# Patient Record
Sex: MALE | Race: WHITE
[De-identification: names, ages, dates, MRNs, and addresses within clinical notes are randomized per-mention and may not be internally consistent; named-entity substitution may affect disease eponyms.]

---

## 2017-08-31 ENCOUNTER — HOSPITAL ENCOUNTER (OUTPATIENT)
Dept: HOSPITAL 76 - EMS | Age: 72
End: 2017-08-31
Attending: SURGERY
Payer: MEDICARE

## 2017-08-31 DIAGNOSIS — Z03.89: Primary | ICD-10-CM

## 2018-06-18 ENCOUNTER — HOSPITAL ENCOUNTER (EMERGENCY)
Dept: HOSPITAL 76 - ED | Age: 73
Discharge: HOME | End: 2018-06-18
Payer: SELF-PAY

## 2018-06-18 ENCOUNTER — HOSPITAL ENCOUNTER (OUTPATIENT)
Age: 73
Discharge: ANOTHER HOSPITAL | End: 2018-06-18

## 2018-06-18 VITALS — DIASTOLIC BLOOD PRESSURE: 70 MMHG | SYSTOLIC BLOOD PRESSURE: 148 MMHG

## 2018-06-18 DIAGNOSIS — R40.4: Primary | ICD-10-CM

## 2018-06-18 LAB
ALBUMIN DIAFP-MCNC: 3.3 G/DL
ALBUMIN/GLOB SERPL: 1 {RATIO}
ALP SERPL-CCNC: 93 IU/L
ALT SERPL W P-5'-P-CCNC: 10 IU/L
AMPHET UR QL SCN: NEGATIVE
ANION GAP SERPL CALCULATED.4IONS-SCNC: 8 MMOL/L
APAP SERPL-MCNC: < 10 UG/ML
AST SERPL W P-5'-P-CCNC: 21 IU/L
BASOPHILS NFR BLD AUTO: 0.1 10^3/UL
BASOPHILS NFR BLD AUTO: 2.4 %
BENZODIAZ UR QL SCN: NEGATIVE
BILIRUB BLD-MCNC: 0.7 MG/DL
BUN SERPL-MCNC: 22 MG/DL
CALCIUM UR-MCNC: 8.6 MG/DL
CHLORIDE SERPL-SCNC: 103 MMOL/L
CK SERPL-CCNC: 95 IU/L
CLARITY UR REFRACT.AUTO: CLEAR
CO2 SERPL-SCNC: 25 MMOL/L
COCAINE UR-SCNC: NEGATIVE UMOL/L
CREAT SERPLBLD-SCNC: 1.1 MG/DL
EOSINOPHIL # BLD AUTO: 0.1 10^3/UL
EOSINOPHIL NFR BLD AUTO: 2.4 %
ERYTHROCYTE [DISTWIDTH] IN BLOOD BY AUTOMATED COUNT: 15.3 %
GFRSERPLBLD MDRD-ARVRAT: 65 ML/MIN/{1.73_M2}
GLOBULIN SER-MCNC: 3.4 G/DL
GLUCOSE SERPL-MCNC: 103 MG/DL
GLUCOSE UR QL STRIP.AUTO: NEGATIVE MG/DL
HGB UR QL STRIP: 11.4 G/DL
KETONES UR QL STRIP.AUTO: NEGATIVE MG/DL
LIPASE SERPL-CCNC: 25 U/L
LYMPHOCYTES # SPEC AUTO: 0.6 10^3/UL
LYMPHOCYTES NFR BLD AUTO: 13.7 %
MCH RBC QN AUTO: 34.1 PG
MCHC RBC AUTO-ENTMCNC: 33.3 G/DL
MCV RBC AUTO: 102.5 FL
METHADONE UR QL SCN: NEGATIVE
METHAMPHET UR QL SCN: NEGATIVE
MONOCYTES # BLD AUTO: 0.2 10^3/UL
MONOCYTES NFR BLD AUTO: 5.4 %
NEUTROPHILS # BLD AUTO: 3.4 10^3/UL
NEUTROPHILS # SNV AUTO: 4.4 X10^3/UL
NEUTROPHILS NFR BLD AUTO: 76.1 %
NITRITE UR QL STRIP.AUTO: NEGATIVE
OPIATES UR QL SCN: NEGATIVE
PDW BLD AUTO: 7.7 FL
PH UR STRIP.AUTO: 5.5 PH
PLATELET # BLD: 166 10^3/UL
PROT SPEC-MCNC: 6.7 G/DL
PROT UR STRIP.AUTO-MCNC: NEGATIVE MG/DL
RBC # UR STRIP.AUTO: NEGATIVE /UL
RBC MAR: 3.34 10^6/UL
SALICYLATES SERPL-MCNC: < 6 MG/DL
SODIUM SERPLBLD-SCNC: 136 MMOL/L
SP GR UR STRIP.AUTO: 1.02
UROBILINOGEN UR QL STRIP.AUTO: (no result) E.U./DL
UROBILINOGEN UR STRIP.AUTO-MCNC: NEGATIVE MG/DL
VOLATILE DRUGS POS SERPL SCN: (no result)

## 2018-06-18 PROCEDURE — 80306 DRUG TEST PRSMV INSTRMNT: CPT

## 2018-06-18 PROCEDURE — 82550 ASSAY OF CK (CPK): CPT

## 2018-06-18 PROCEDURE — 85025 COMPLETE CBC W/AUTO DIFF WBC: CPT

## 2018-06-18 PROCEDURE — 81001 URINALYSIS AUTO W/SCOPE: CPT

## 2018-06-18 PROCEDURE — 93005 ELECTROCARDIOGRAM TRACING: CPT

## 2018-06-18 PROCEDURE — 80307 DRUG TEST PRSMV CHEM ANLYZR: CPT

## 2018-06-18 PROCEDURE — 80320 DRUG SCREEN QUANTALCOHOLS: CPT

## 2018-06-18 PROCEDURE — 81003 URINALYSIS AUTO W/O SCOPE: CPT

## 2018-06-18 PROCEDURE — 83690 ASSAY OF LIPASE: CPT

## 2018-06-18 PROCEDURE — 99284 EMERGENCY DEPT VISIT MOD MDM: CPT

## 2018-06-18 PROCEDURE — 80053 COMPREHEN METABOLIC PANEL: CPT

## 2018-06-18 PROCEDURE — 70450 CT HEAD/BRAIN W/O DYE: CPT

## 2018-06-18 PROCEDURE — 36415 COLL VENOUS BLD VENIPUNCTURE: CPT

## 2018-06-18 PROCEDURE — 84484 ASSAY OF TROPONIN QUANT: CPT

## 2018-06-18 PROCEDURE — 80329 ANALGESICS NON-OPIOID 1 OR 2: CPT

## 2018-06-18 PROCEDURE — 87086 URINE CULTURE/COLONY COUNT: CPT

## 2018-06-18 PROCEDURE — 96360 HYDRATION IV INFUSION INIT: CPT

## 2018-06-18 NOTE — CT REPORT
Procedure Date:  06/18/2018   

Accession Number:  [id number] / [id number]                    

Procedure:  CT  - Head W/O CPT Code:  

 

FULL RESULT:

 

 

EXAM:

CT HEAD

 

EXAM DATE: 6/18/2018 04:31 PM.

 

CLINICAL HISTORY: ALOC.

 

COMPARISON: None.

 

TECHNIQUE: Multiaxial CT images were obtained from the foramen magnum to 

the vertex. Reformats: Coronal. IV contrast: None.

 

In accordance with CT protocol optimization, one or more of the following 

dose reduction techniques were utilized for this exam: automated exposure 

control, adjustment of mA and/or KV based on patient size, or use of 

iterative reconstructive technique.

 

FINDINGS:

Parenchyma: Negative for acute intracranial hemorrhage. No midline shift. 

No localizing edema demonstrated.

 

Extraaxial Spaces:  No subdural or epidural collections identified.

 

Ventricles: There is symmetric moderate to marked ventriculomegaly.

 

Sinuses and Orbits: Imaged paranasal sinuses, orbits, and mastoids show 

no significant abnormality.

 

Bones: No evidence of fracture or calvarial defect.

 

Other: None.

IMPRESSION:

1. Symmetric moderate to marked ventriculomegaly and evidence of 

parenchymal volume loss. No acute hemorrhage or shift.

 

[facility]

## 2018-06-18 NOTE — ED PHYSICIAN DOCUMENTATION
PD HPI ALTERED MENTAL STATUS





- Stated complaint


Stated Complaint: CONFUSION





- History obtained from


History obtained from: Patient, EMS





- History of Present Illness


Timing - onset: Unknown


Timing - duration: Other (unkown)


Quality / character: Confused


Associated symptoms: No: Headache, Stiff neck, Dyspnea, Cough, NVD


Basline status: Alert and oriented X 3


Treatment PTA: Accucheck (Normal per EMS)


Similar symptoms before: Has not had sx before


Recently seen: Not recently seen





- Additional information


Additional information: 





Per EMS the patient was found stumbling by police.  They thought he may be 

intoxicated and called EMS.  When EMS arrived he was confused, not oriented.  

They state he had a temperature of 101.  They have now brought him to the 

emergency department.  He states he does not know why he is here.  States he 

feels normal.  States he does not take any medications, is allergic to 

penicillin.  Denies any headache, chest pain. No abdominal pain.  No urinary 

symptoms.  Denies any drug use.





Review of Systems


Ten Systems: 10 systems reviewed and negative


Constitutional: denies: Chills


Ears: denies: Ear pain


Nose: denies: Rhinorrhea / runny nose, Congestion


Throat: denies: Sore throat


Cardiac: denies: Chest pain / pressure


Respiratory: denies: Cough


GI: denies: Nausea, Vomiting, Diarrhea


: denies: Dysuria


Skin: denies: Rash


Musculoskeletal: denies: Neck pain, Back pain


Neurologic: denies: Focal weakness, Numbness, Syncope, Seizure, Headache





PD PAST MEDICAL HISTORY





- Past Medical History


Past Medical History: No





- Past Surgical History


Past Surgical History: No





- Present Medications


Home Medications: 


 Ambulatory Orders











 Medication  Instructions  Recorded  Confirmed


 


No Known Home Medications [No  06/18/18 06/18/18





Known Home Medications]   














- Allergies


Allergies/Adverse Reactions: 


 Allergies











Allergy/AdvReac Type Severity Reaction Status Date / Time


 


Sulfa (Sulfonamide Allergy  Unknown Verified 06/18/18 16:06





Antibiotics)     


 


Penicillins AdvReac  Unknown Verified 06/18/18 16:06














- Living Situation


Living Arrangement: reports: At home





- Social History


Does the pt smoke?: Yes


Does the pt drink ETOH?: Yes


Does the pt have substance abuse?: No





- Family History


Family history: reports: Non contributory





PD ED PE NORMAL





- Vitals


Vital signs reviewed: Yes





- General


General: Alert and oriented X 3, No acute distress





- HEENT


HEENT: Atraumatic, PERRL, EOMI, Ears normal, Moist mucous membranes, Pharynx 

benign, Other (wound to the L side of the nose, patient states it is chronic. )





- Neck


Neck: Supple, no meningeal sign, No bony TTP





- Cardiac


Cardiac: RRR, Strong equal pulses





- Respiratory


Respiratory: No respiratory distress, Clear bilaterally





- Abdomen


Abdomen: Soft, Non tender, Non distended





- Back


Back: No spinal TTP





- Derm


Derm: Warm and dry, No rash





- Extremities


Extremities: No deformity, No edema, No calf tenderness / cord





- Neuro


Neuro: Alert and oriented X 3, CNs 2-12 intact, No motor deficit, No sensory 

deficit, Normal speech


Eye Opening: Spontaneous


Motor: Obeys Commands


Verbal: Oriented


GCS Score: 15





- Psych


Psych: Normal mood, Normal affect





Results





- Vitals


Vitals: 


 Vital Signs - 24 hr











  06/18/18 06/18/18 06/18/18





  16:06 16:24 16:52


 


Temperature 37.4 C  


 


Heart Rate 86 86 63


 


Respiratory 15 16 15





Rate   


 


Blood Pressure 120/77 120/77 117/70


 


O2 Saturation 93 93 96














  06/18/18 06/18/18





  17:48 18:31


 


Temperature  36.3 C L


 


Heart Rate 68 69


 


Respiratory  16





Rate  


 


Blood Pressure 133/68 H 148/70 H


 


O2 Saturation  93








 Oxygen











O2 Source                      Room air

















- Labs


Labs: 


 Laboratory Tests











  06/18/18 06/18/18 06/18/18





  16:10 16:10 16:10


 


WBC  4.4 L  


 


RBC  3.34 L  


 


Hgb  11.4 L  


 


Hct  34.2 L  


 


MCV  102.5 H  


 


MCH  34.1 H  


 


MCHC  33.3  


 


RDW  15.3 H  


 


Plt Count  166  


 


MPV  7.7  


 


Neut # (Auto)  3.4  


 


Lymph # (Auto)  0.6 L  


 


Mono # (Auto)  0.2  


 


Eos # (Auto)  0.1  


 


Baso # (Auto)  0.1  


 


Absolute Nucleated RBC  0.01  


 


Nucleated RBC %  0.1  


 


Sodium   136 


 


Potassium   4.0 


 


Chloride   103 


 


Carbon Dioxide   25 


 


Anion Gap   8.0 


 


BUN   22 H 


 


Creatinine   1.1 


 


Estimated GFR (MDRD)   65 L 


 


Glucose   103 H 


 


Calcium   8.6 


 


Total Bilirubin   0.7 


 


AST   21 


 


ALT   10 


 


Alkaline Phosphatase   93 


 


Total Creatine Kinase   95 


 


Troponin I    < 0.04


 


Total Protein   6.7 


 


Albumin   3.3 


 


Globulin   3.4 


 


Albumin/Globulin Ratio   1.0 


 


Lipase   25 


 


Urine Color   


 


Urine Clarity   


 


Urine pH   


 


Ur Specific Gravity   


 


Urine Protein   


 


Urine Glucose (UA)   


 


Urine Ketones   


 


Urine Occult Blood   


 


Urine Nitrite   


 


Urine Bilirubin   


 


Urine Urobilinogen   


 


Ur Leukocyte Esterase   


 


Ur Microscopic Review   


 


Urine Culture Comments   


 


Salicylates   < 6.0 


 


Urine Opiates Screen   


 


Ur Oxycodone Screen   


 


Urine Methadone Screen   


 


Ur Propoxyphene Screen   


 


Acetaminophen   < 10 L 


 


Ur Barbiturates Screen   


 


Ur Tricyclics Screen   


 


Ur Phencyclidine Scrn   


 


Ur Amphetamine Screen   


 


U Methamphetamines Scrn   


 


U Benzodiazepines Scrn   


 


Urine Cocaine Screen   


 


U Cannabinoids Screen   


 


Ethyl Alcohol   < 5.0 














  06/18/18





  17:40


 


WBC 


 


RBC 


 


Hgb 


 


Hct 


 


MCV 


 


MCH 


 


MCHC 


 


RDW 


 


Plt Count 


 


MPV 


 


Neut # (Auto) 


 


Lymph # (Auto) 


 


Mono # (Auto) 


 


Eos # (Auto) 


 


Baso # (Auto) 


 


Absolute Nucleated RBC 


 


Nucleated RBC % 


 


Sodium 


 


Potassium 


 


Chloride 


 


Carbon Dioxide 


 


Anion Gap 


 


BUN 


 


Creatinine 


 


Estimated GFR (MDRD) 


 


Glucose 


 


Calcium 


 


Total Bilirubin 


 


AST 


 


ALT 


 


Alkaline Phosphatase 


 


Total Creatine Kinase 


 


Troponin I 


 


Total Protein 


 


Albumin 


 


Globulin 


 


Albumin/Globulin Ratio 


 


Lipase 


 


Urine Color  YELLOW


 


Urine Clarity  CLEAR


 


Urine pH  5.5


 


Ur Specific Gravity  1.025


 


Urine Protein  NEGATIVE


 


Urine Glucose (UA)  NEGATIVE


 


Urine Ketones  NEGATIVE


 


Urine Occult Blood  NEGATIVE


 


Urine Nitrite  NEGATIVE


 


Urine Bilirubin  NEGATIVE


 


Urine Urobilinogen  0.2 (NORMAL)


 


Ur Leukocyte Esterase  NEGATIVE


 


Ur Microscopic Review  NOT INDICATED


 


Urine Culture Comments  NOT INDICATED


 


Salicylates 


 


Urine Opiates Screen  NEGATIVE


 


Ur Oxycodone Screen  NEGATIVE


 


Urine Methadone Screen  NEGATIVE


 


Ur Propoxyphene Screen  NEGATIVE


 


Acetaminophen 


 


Ur Barbiturates Screen  NEGATIVE


 


Ur Tricyclics Screen  NEGATIVE


 


Ur Phencyclidine Scrn  NEGATIVE


 


Ur Amphetamine Screen  NEGATIVE


 


U Methamphetamines Scrn  NEGATIVE


 


U Benzodiazepines Scrn  NEGATIVE


 


Urine Cocaine Screen  NEGATIVE


 


U Cannabinoids Screen  NEGATIVE


 


Ethyl Alcohol 














- Rads (name of study)


  ** head ct


Radiology: Prelim report reviewed, EMP read contemporaneously, See rad report (

Symmetric moderate to marked ventriculomegaly and evidence of parenchymal 

volume loss. No acute hemorrhage or shift. )





PD MEDICAL DECISION MAKING





- ED course


Complexity details: reviewed results, re-evaluated patient, considered 

differential, d/w patient


ED course: 





Patient is a 72-year-old male who was brought in by EMS for altered mental 

status via police report earlier today.  He is alert and oriented 3 in the 

emergency department.  No fever.  No laboratory abnormalities.  No 

abnormalities on head CT.  No evidence of stroke.  Unclear etiology of his 

symptoms earlier.  We will have him follow-up closely with his doctor.  

Ambulating well.  States he wants to go outside for a cigarette.  Patient was 

picked up by a friend and taken home.  Patient counseled regarding signs and 

symptoms for which I believe and urgent re-evaluation would be necessary. 

Patient with good understanding of and agreement to plan and is comfortable 

going home at this time





This document was made in part using voice recognition software. While efforts 

are made to proofread this document, sound alike and grammatical errors may 

occur.





- Sepsis Event


Vital Signs: 


 Vital Signs - 24 hr











  06/18/18 06/18/18 06/18/18





  16:06 16:24 16:52


 


Temperature 37.4 C  


 


Heart Rate 86 86 63


 


Respiratory 15 16 15





Rate   


 


Blood Pressure 120/77 120/77 117/70


 


O2 Saturation 93 93 96














  06/18/18 06/18/18





  17:48 18:31


 


Temperature  36.3 C L


 


Heart Rate 68 69


 


Respiratory  16





Rate  


 


Blood Pressure 133/68 H 148/70 H


 


O2 Saturation  93








 Oxygen











O2 Source                      Room air

















Departure





- Departure


Disposition: 01 Home, Self Care


Clinical Impression: 


Altered mental status


Qualifiers:


 Altered mental status type: transient alteration of awareness Qualified Code(s)

: R40.4 - Transient alteration of awareness





Condition: Good


Instructions:  ED Altered Loc


Follow-Up: 


your,doctor in 1 week [Other]


Comments: 


Return if you worsen. Drink plenty of water at home. 


Discharge Date/Time: 06/18/18 19:04

## 2018-08-15 ENCOUNTER — HOSPITAL ENCOUNTER (OUTPATIENT)
Dept: HOSPITAL 76 - EMS | Age: 73
Discharge: ANOTHER HOSPITAL | End: 2018-08-15
Attending: SURGERY
Payer: SELF-PAY

## 2018-08-15 ENCOUNTER — HOSPITAL ENCOUNTER (EMERGENCY)
Dept: HOSPITAL 76 - ED | Age: 73
Discharge: HOME | End: 2018-08-15
Payer: SELF-PAY

## 2018-08-15 VITALS — DIASTOLIC BLOOD PRESSURE: 90 MMHG | SYSTOLIC BLOOD PRESSURE: 147 MMHG
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
## 2018-08-15 DIAGNOSIS — Z23: ICD-10-CM
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
## 2018-08-15 DIAGNOSIS — S50.312A: ICD-10-CM

## 2018-08-15 DIAGNOSIS — S00.03XA: Primary | ICD-10-CM
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
## 2018-08-15 DIAGNOSIS — S50.311A: ICD-10-CM
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
## 2018-08-15 DIAGNOSIS — S00.01XA: ICD-10-CM

## 2018-08-15 DIAGNOSIS — R41.0: ICD-10-CM

## 2018-08-15 DIAGNOSIS — F17.200: ICD-10-CM

## 2018-08-15 DIAGNOSIS — S09.90XA: Primary | ICD-10-CM

## 2018-08-15 LAB
ALBUMIN DIAFP-MCNC: 3.6 G/DL (ref 3.2–5.5)
ALBUMIN/GLOB SERPL: 1.2 {RATIO} (ref 1–2.2)
ALP SERPL-CCNC: 77 IU/L (ref 42–121)
ALT SERPL W P-5'-P-CCNC: 12 IU/L (ref 10–60)
AMPHET UR QL SCN: NEGATIVE
ANION GAP SERPL CALCULATED.4IONS-SCNC: 5 MMOL/L (ref 6–13)
AST SERPL W P-5'-P-CCNC: 22 IU/L (ref 10–42)
BASOPHILS NFR BLD AUTO: 0.1 10^3/UL (ref 0–0.1)
BASOPHILS NFR BLD AUTO: 1.5 %
BENZODIAZ UR QL SCN: NEGATIVE
BILIRUB BLD-MCNC: 0.5 MG/DL (ref 0.2–1)
BUN SERPL-MCNC: 19 MG/DL (ref 6–20)
CALCIUM UR-MCNC: 8.6 MG/DL (ref 8.5–10.3)
CHLORIDE SERPL-SCNC: 103 MMOL/L (ref 101–111)
CLARITY UR REFRACT.AUTO: CLEAR
CO2 SERPL-SCNC: 29 MMOL/L (ref 21–32)
COCAINE UR-SCNC: NEGATIVE UMOL/L
CREAT SERPLBLD-SCNC: 1.1 MG/DL (ref 0.6–1.2)
EOSINOPHIL # BLD AUTO: 0.1 10^3/UL (ref 0–0.7)
EOSINOPHIL NFR BLD AUTO: 1.9 %
ERYTHROCYTE [DISTWIDTH] IN BLOOD BY AUTOMATED COUNT: 15.7 % (ref 12–15)
GFRSERPLBLD MDRD-ARVRAT: 65 ML/MIN/{1.73_M2} (ref 89–?)
GLOBULIN SER-MCNC: 3 G/DL (ref 2.1–4.2)
GLUCOSE SERPL-MCNC: 108 MG/DL (ref 70–100)
GLUCOSE UR QL STRIP.AUTO: NEGATIVE MG/DL
HGB UR QL STRIP: 11.5 G/DL (ref 14–18)
KETONES UR QL STRIP.AUTO: NEGATIVE MG/DL
LIPASE SERPL-CCNC: 23 U/L (ref 22–51)
LYMPHOCYTES # SPEC AUTO: 0.6 10^3/UL (ref 1.5–3.5)
LYMPHOCYTES NFR BLD AUTO: 15.5 %
MCH RBC QN AUTO: 34.8 PG (ref 27–31)
MCHC RBC AUTO-ENTMCNC: 34.2 G/DL (ref 32–36)
MCV RBC AUTO: 101.7 FL (ref 80–94)
METHADONE UR QL SCN: NEGATIVE
METHAMPHET UR QL SCN: NEGATIVE
MONOCYTES # BLD AUTO: 0.2 10^3/UL (ref 0–1)
MONOCYTES NFR BLD AUTO: 6.6 %
NEUTROPHILS # BLD AUTO: 2.7 10^3/UL (ref 1.5–6.6)
NEUTROPHILS # SNV AUTO: 3.6 X10^3/UL (ref 4.8–10.8)
NEUTROPHILS NFR BLD AUTO: 74.5 %
NITRITE UR QL STRIP.AUTO: NEGATIVE
OPIATES UR QL SCN: NEGATIVE
PDW BLD AUTO: 7.8 FL (ref 7.4–11.4)
PH UR STRIP.AUTO: 6.5 PH (ref 5–7.5)
PLATELET # BLD: 150 10^3/UL (ref 130–450)
PROT SPEC-MCNC: 6.6 G/DL (ref 6.7–8.2)
PROT UR STRIP.AUTO-MCNC: NEGATIVE MG/DL
RBC # UR STRIP.AUTO: NEGATIVE /UL
RBC MAR: 3.32 10^6/UL (ref 4.7–6.1)
SODIUM SERPLBLD-SCNC: 137 MMOL/L (ref 135–145)
SP GR UR STRIP.AUTO: 1.02 (ref 1–1.03)
UROBILINOGEN UR QL STRIP.AUTO: (no result) E.U./DL
UROBILINOGEN UR STRIP.AUTO-MCNC: NEGATIVE MG/DL
VOLATILE DRUGS POS SERPL SCN: (no result)

## 2018-08-15 PROCEDURE — 81003 URINALYSIS AUTO W/O SCOPE: CPT

## 2018-08-15 PROCEDURE — 80320 DRUG SCREEN QUANTALCOHOLS: CPT

## 2018-08-15 PROCEDURE — 36415 COLL VENOUS BLD VENIPUNCTURE: CPT

## 2018-08-15 PROCEDURE — 90471 IMMUNIZATION ADMIN: CPT

## 2018-08-15 PROCEDURE — 80053 COMPREHEN METABOLIC PANEL: CPT

## 2018-08-15 PROCEDURE — 80306 DRUG TEST PRSMV INSTRMNT: CPT

## 2018-08-15 PROCEDURE — 99284 EMERGENCY DEPT VISIT MOD MDM: CPT

## 2018-08-15 PROCEDURE — 93005 ELECTROCARDIOGRAM TRACING: CPT

## 2018-08-15 PROCEDURE — 87086 URINE CULTURE/COLONY COUNT: CPT

## 2018-08-15 PROCEDURE — 84484 ASSAY OF TROPONIN QUANT: CPT

## 2018-08-15 PROCEDURE — 83690 ASSAY OF LIPASE: CPT

## 2018-08-15 PROCEDURE — 85025 COMPLETE CBC W/AUTO DIFF WBC: CPT

## 2018-08-15 PROCEDURE — 70450 CT HEAD/BRAIN W/O DYE: CPT

## 2018-08-15 PROCEDURE — 99283 EMERGENCY DEPT VISIT LOW MDM: CPT

## 2018-08-15 PROCEDURE — 81001 URINALYSIS AUTO W/SCOPE: CPT

## 2018-08-15 NOTE — ED PHYSICIAN DOCUMENTATION
PD HPI Fall





- Stated complaint


Stated Complaint: GLF





- Chief complaint


Chief Complaint: Trauma Hd/Nk





- History obtained from


History obtained from: Patient, EMS





- History of Present Illness


Mechanism of injury: Tripped


Fall distance: Standing position


Where injury occurred: Street


Injury(ies) location: Head, Right Upper Extremity, Left Uppper Extremity


Associated symptoms: No: LOC, Neck pain, Dyspnea, Nausea / vomiting





- Additional information


Additional information: 


The patient is a 72-year-old male who arrives via ambulance after stumbling 

over a curb when crossing a street, falling backwards and hitting his head on 

the pavement.  He denies loss of consciousness, and denies any pain at this 

time.  Medics report that he was ambulatory at the scene, and had a normal 

blood sugar.  He denies any recent use of alcohol or other drugs.  He takes no 

prescription medication. Review of his medical record reveals that he was seen 

here 2 months ago for evaluation of transient confusion.  He lives alone in his 

own home.








Review of Systems


Constitutional: denies: Fever


Eyes: denies: Decreased vision


Ears: denies: Tinnitus/ringing


Nose: denies: Congestion


Throat: denies: Sore throat


Cardiac: denies: Chest pain / pressure


Respiratory: denies: Dyspnea, Cough


GI: denies: Abdominal Pain, Nausea, Vomiting


: denies: Dysuria


Skin: reports: Abrasion (s) (abrasions of elbows bilaterally.).  denies: Rash


Musculoskeletal: denies: Neck pain, Back pain


Neurologic: denies: Focal weakness, Numbness, Headache, LOC





PD PAST MEDICAL HISTORY





- Past Medical History


Cardiovascular: None


Endocrine/Autoimmune: None


Derm: Other





- Past Surgical History


Past Surgical History: No





- Present Medications


Home Medications: 


 Ambulatory Orders











 Medication  Instructions  Recorded  Confirmed


 


No Known Home Medications [No  06/18/18 06/18/18





Known Home Medications]   














- Allergies


Allergies/Adverse Reactions: 


 Allergies











Allergy/AdvReac Type Severity Reaction Status Date / Time


 


Sulfa (Sulfonamide Allergy  Unknown Verified 06/18/18 16:06





Antibiotics)     


 


Penicillins AdvReac  Unknown Verified 06/18/18 16:06














- Living Situation


Living Arrangement: reports: At home





- Social History


Does the pt smoke?: Yes


Smoking Status: Current every day smoker


Does the pt drink ETOH?: Yes


Does the pt have substance abuse?: No





PD ED PE NORMAL





- Vitals


Vital signs reviewed: Yes (normal)





- General


General: Alert and oriented X 3, No acute distress, Other (ill kempt)





- HEENT


HEENT: Atraumatic, Moist mucous membranes, Pharynx benign, Other (esotropia; 

superficial abrasion to occipital scalp.)





- Neck


Neck: Supple, no meningeal sign, No bony TTP, No JVD, Other (Full cervical 

range of motion, without tenderness.)





- Cardiac


Cardiac: RRR





- Respiratory


Respiratory: No respiratory distress, Clear bilaterally





- Abdomen


Abdomen: Soft, Non tender





- Back


Back: No spinal TTP





- Derm


Derm: No rash





- Extremities


Extremities: No calf tenderness / cord, Other (Superficial abrasions at the 

posterior aspects of both elbows.  He has full range of motion of the elbows, 

including supination and pronation of the forearms, without tenderness.  Distal 

neurovascular is intact.  Bilateral lower extremity lymphedema.)





- Neuro


Neuro: Alert and oriented X 3 (Alert and basically oriented, except for mild 

confusion regarding recent events, consistent with dementia.), No motor deficit

, No sensory deficit, Other (The patient demonstrates mild confusion, 

consistent with dementia.)


Eye Opening: Spontaneous


Motor: Obeys Commands


Verbal: Confused (Mild confusion regarding recent events.)


GCS Score: 14





Results





- Vitals


Vitals: 


 Oxygen











O2 Source                      Room air

















- EKG (time done)


  ** 14:43


Rate: Rate (enter#) (65)


Rhythm: NSR


Axis: LAD


Intervals: Normal [place]


Ischemia: Q waves (in V1, V2, consistent with previous anterior MI.)


Compare to prior EKG: Unchanged from prior EKG


Computer interpretation: Agree with computer





- Labs


Labs: 


 Laboratory Tests











  08/15/18 08/15/18 08/15/18





  14:28 14:28 14:28


 


WBC  3.6 L  


 


RBC  3.32 L  


 


Hgb  11.5 L  


 


Hct  33.7 L  


 


MCV  101.7 H  


 


MCH  34.8 H  


 


MCHC  34.2  


 


RDW  15.7 H  


 


Plt Count  150  


 


MPV  7.8  


 


Neut # (Auto)  2.7  


 


Lymph # (Auto)  0.6 L  


 


Mono # (Auto)  0.2  


 


Eos # (Auto)  0.1  


 


Baso # (Auto)  0.1  


 


Absolute Nucleated RBC  0.00  


 


Nucleated RBC %  0.0  


 


Sodium   137 


 


Potassium   4.1 


 


Chloride   103 


 


Carbon Dioxide   29 


 


Anion Gap   5.0 L 


 


BUN   19 


 


Creatinine   1.1 


 


Estimated GFR (MDRD)   65 L 


 


Glucose   108 H 


 


Calcium   8.6 


 


Total Bilirubin   0.5 


 


AST   22 


 


ALT   12 


 


Alkaline Phosphatase   77 


 


Troponin I    < 0.04


 


Total Protein   6.6 L 


 


Albumin   3.6 


 


Globulin   3.0 


 


Albumin/Globulin Ratio   1.2 


 


Lipase   23 


 


Urine Color   


 


Urine Clarity   


 


Urine pH   


 


Ur Specific Gravity   


 


Urine Protein   


 


Urine Glucose (UA)   


 


Urine Ketones   


 


Urine Occult Blood   


 


Urine Nitrite   


 


Urine Bilirubin   


 


Urine Urobilinogen   


 


Ur Leukocyte Esterase   


 


Ur Microscopic Review   


 


Urine Culture Comments   


 


Urine Opiates Screen   


 


Ur Oxycodone Screen   


 


Urine Methadone Screen   


 


Ur Propoxyphene Screen   


 


Ur Barbiturates Screen   


 


Ur Tricyclics Screen   


 


Ur Phencyclidine Scrn   


 


Ur Amphetamine Screen   


 


U Methamphetamines Scrn   


 


U Benzodiazepines Scrn   


 


Urine Cocaine Screen   


 


U Cannabinoids Screen   


 


Ethyl Alcohol   < 5.0 














  08/15/18





  16:10


 


WBC 


 


RBC 


 


Hgb 


 


Hct 


 


MCV 


 


MCH 


 


MCHC 


 


RDW 


 


Plt Count 


 


MPV 


 


Neut # (Auto) 


 


Lymph # (Auto) 


 


Mono # (Auto) 


 


Eos # (Auto) 


 


Baso # (Auto) 


 


Absolute Nucleated RBC 


 


Nucleated RBC % 


 


Sodium 


 


Potassium 


 


Chloride 


 


Carbon Dioxide 


 


Anion Gap 


 


BUN 


 


Creatinine 


 


Estimated GFR (MDRD) 


 


Glucose 


 


Calcium 


 


Total Bilirubin 


 


AST 


 


ALT 


 


Alkaline Phosphatase 


 


Troponin I 


 


Total Protein 


 


Albumin 


 


Globulin 


 


Albumin/Globulin Ratio 


 


Lipase 


 


Urine Color  YELLOW


 


Urine Clarity  CLEAR


 


Urine pH  6.5


 


Ur Specific Gravity  1.020


 


Urine Protein  NEGATIVE


 


Urine Glucose (UA)  NEGATIVE


 


Urine Ketones  NEGATIVE


 


Urine Occult Blood  NEGATIVE


 


Urine Nitrite  NEGATIVE


 


Urine Bilirubin  NEGATIVE


 


Urine Urobilinogen  0.2 (NORMAL)


 


Ur Leukocyte Esterase  NEGATIVE


 


Ur Microscopic Review  NOT INDICATED


 


Urine Culture Comments  NOT INDICATED


 


Urine Opiates Screen  NEGATIVE


 


Ur Oxycodone Screen  NEGATIVE


 


Urine Methadone Screen  NEGATIVE


 


Ur Propoxyphene Screen  NEGATIVE


 


Ur Barbiturates Screen  NEGATIVE


 


Ur Tricyclics Screen  NEGATIVE


 


Ur Phencyclidine Scrn  NEGATIVE


 


Ur Amphetamine Screen  NEGATIVE


 


U Methamphetamines Scrn  NEGATIVE


 


U Benzodiazepines Scrn  NEGATIVE


 


Urine Cocaine Screen  NEGATIVE


 


U Cannabinoids Screen  NEGATIVE


 


Ethyl Alcohol 














- Rads (name of study)


  ** head CT


Radiology: Prelim report reviewed, EMP read contemporaneously, See rad report (

Normal pressure hydrocephalus.  No acute intracranial abnormality nor bleed.)





PD MEDICAL DECISION MAKING





- ED course


Complexity details: reviewed old records, reviewed results, re-evaluated patient

, considered differential, d/w patient


ED course: 


The patient's presentation is significant for fall to the pavement, with 

injuries that include abrasions to the occipital scalp and to both elbows 

posteriorly.  Head CT reveals no acute intracranial abnormality.  Laboratory 

evaluation is unremarkable.


Treatment in the emergency department included cleaning of the abrasions and 

application of antibiotic ointment and wound dressings.  Tetanus booster was 

administered.  The patient demonstrated ability to ambulate without difficulty.

  I discussed with him the expected course of injuries, symptomatic treatment 

and outpatient follow-up, as well as potentially worrisome signs or symptoms 

that should prompt reevaluation in the emergency department.








- Sepsis Event


Vital Signs: 


 Oxygen











O2 Source                      Room air

















Departure





- Departure


Disposition: 01 Home, Self Care


Clinical Impression: 


 Abrasions of multiple sites





Fall


Qualifiers:


 Encounter type: initial encounter Qualified Code(s): W19.XXXA - Unspecified 

fall, initial encounter





Contusion of head


Qualifiers:


 Encounter type: initial encounter Contusion of head detail: scalp Qualified 

Code(s): S00.03XA - Contusion of scalp, initial encounter





Condition: Stable


Instructions:  ED Head Injury Closed


Comments: 


You can use ibuprofen, up to 800 mg 3 times daily if needed for discomfort.


Keep the abrasions clean.


Follow up with your primary physician within 1-2 weeks.  Call to schedule 

appointment.


Return to the emergency department if you develop increasing headache, 

persistent vomiting, increased difficulty walking, or otherwise worsening 

symptoms.


Discharge Date/Time: 08/15/18 16:20

## 2018-08-15 NOTE — CT REPORT
Procedure Date:  08/15/2018   

Accession Number:  [id number] / [id number]                    

Procedure:  CT  - Head W/O CPT Code:  

 

FULL RESULT:

 

 

EXAM:

CT HEAD

 

EXAM DATE: 8/15/2018 02:40 PM.

 

CLINICAL HISTORY: Fall with head injury.

 

COMPARISON: Head w/o contrast 06/18/2018.

 

TECHNIQUE: Multiaxial CT images were obtained from the foramen magnum to 

the vertex. Reformats: Coronal. IV contrast: None.

 

In accordance with CT protocol optimization, one or more of the following 

dose reduction techniques were utilized for this exam: automated exposure 

control, adjustment of mA and/or KV based on patient size, or use of 

iterative reconstructive technique.

 

FINDINGS:

Parenchyma: No intraparenchymal hemorrhage. No evidence of mass, midline 

shift, or CT findings of acute infarction. Gray-white differentiation is 

distinct. Diffuse chronic microangiopathic white matter changes are 

evident.

 

Extraaxial Spaces: Normal for age. No subdural or epidural collections 

identified.

 

Ventricles: Disproportionate moderate-to-marked enlargement of the third 

and lateral ventricles. Fourth ventricle is small in caliber. No midline 

shift nor intraventricular blood products.

 

Sinuses and orbits: Imaged paranasal sinuses, orbits, and mastoids show 

no significant abnormality.

 

Bones: No evidence of fracture or calvarial defect.

 

Other: None.

IMPRESSION:

1. Normal pressure hydrocephalus.

2. No acute intracranial abnormality nor bleed.

 

[facility]

## 2018-10-08 ENCOUNTER — HOSPITAL ENCOUNTER (EMERGENCY)
Dept: HOSPITAL 76 - ED | Age: 73
Discharge: HOME | End: 2018-10-08
Payer: SELF-PAY

## 2018-10-08 ENCOUNTER — HOSPITAL ENCOUNTER (OUTPATIENT)
Dept: HOSPITAL 76 - EMS | Age: 73
Discharge: ANOTHER HOSPITAL | End: 2018-10-08
Attending: SURGERY
Payer: SELF-PAY

## 2018-10-08 DIAGNOSIS — F17.200: ICD-10-CM

## 2018-10-08 DIAGNOSIS — Z91.81: ICD-10-CM

## 2018-10-08 DIAGNOSIS — G91.2: Primary | ICD-10-CM
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
## 2018-10-08 DIAGNOSIS — M25.521: Primary | ICD-10-CM

## 2018-10-08 DIAGNOSIS — F03.90: ICD-10-CM

## 2018-10-08 LAB
ALBUMIN DIAFP-MCNC: 4 G/DL (ref 3.2–5.5)
ALBUMIN/GLOB SERPL: 1.2 {RATIO} (ref 1–2.2)
ALP SERPL-CCNC: 129 IU/L (ref 42–121)
ALT SERPL W P-5'-P-CCNC: 13 IU/L (ref 10–60)
AMPHET UR QL SCN: NEGATIVE
ANION GAP SERPL CALCULATED.4IONS-SCNC: 10 MMOL/L (ref 6–13)
APAP SERPL-MCNC: < 10 UG/ML (ref 10–30)
AST SERPL W P-5'-P-CCNC: 25 IU/L (ref 10–42)
BASOPHILS NFR BLD AUTO: 0.1 10^3/UL (ref 0–0.1)
BASOPHILS NFR BLD AUTO: 1.3 %
BENZODIAZ UR QL SCN: NEGATIVE
BILIRUB BLD-MCNC: 0.8 MG/DL (ref 0.2–1)
BUN SERPL-MCNC: 29 MG/DL (ref 6–20)
CALCIUM UR-MCNC: 8.7 MG/DL (ref 8.5–10.3)
CHLORIDE SERPL-SCNC: 99 MMOL/L (ref 101–111)
CLARITY UR REFRACT.AUTO: CLEAR
CO2 SERPL-SCNC: 26 MMOL/L (ref 21–32)
COCAINE UR-SCNC: NEGATIVE UMOL/L
CREAT SERPLBLD-SCNC: 1.2 MG/DL (ref 0.6–1.2)
EOSINOPHIL # BLD AUTO: 0.1 10^3/UL (ref 0–0.7)
EOSINOPHIL NFR BLD AUTO: 1.8 %
ERYTHROCYTE [DISTWIDTH] IN BLOOD BY AUTOMATED COUNT: 15.6 % (ref 12–15)
GFRSERPLBLD MDRD-ARVRAT: 59 ML/MIN/{1.73_M2} (ref 89–?)
GLOBULIN SER-MCNC: 3.3 G/DL (ref 2.1–4.2)
GLUCOSE SERPL-MCNC: 90 MG/DL (ref 70–100)
GLUCOSE UR QL STRIP.AUTO: NEGATIVE MG/DL
HGB UR QL STRIP: 12.3 G/DL (ref 14–18)
KETONES UR QL STRIP.AUTO: NEGATIVE MG/DL
LIPASE SERPL-CCNC: 25 U/L (ref 22–51)
LYMPHOCYTES # SPEC AUTO: 0.7 10^3/UL (ref 1.5–3.5)
LYMPHOCYTES NFR BLD AUTO: 12.5 %
MAGNESIUM SERPL-MCNC: 2.2 MG/DL (ref 1.7–2.8)
MCH RBC QN AUTO: 34.1 PG (ref 27–31)
MCHC RBC AUTO-ENTMCNC: 33.7 G/DL (ref 32–36)
MCV RBC AUTO: 101.5 FL (ref 80–94)
METHADONE UR QL SCN: NEGATIVE
METHAMPHET UR QL SCN: NEGATIVE
MONOCYTES # BLD AUTO: 0.4 10^3/UL (ref 0–1)
MONOCYTES NFR BLD AUTO: 8 %
NEUTROPHILS # BLD AUTO: 4.2 10^3/UL (ref 1.5–6.6)
NEUTROPHILS # SNV AUTO: 5.5 X10^3/UL (ref 4.8–10.8)
NEUTROPHILS NFR BLD AUTO: 76.4 %
NITRITE UR QL STRIP.AUTO: NEGATIVE
OPIATES UR QL SCN: NEGATIVE
PDW BLD AUTO: 7.7 FL (ref 7.4–11.4)
PH UR STRIP.AUTO: 5.5 PH (ref 5–7.5)
PLATELET # BLD: 185 10^3/UL (ref 130–450)
PROT SPEC-MCNC: 7.3 G/DL (ref 6.7–8.2)
PROT UR STRIP.AUTO-MCNC: NEGATIVE MG/DL
RBC # UR STRIP.AUTO: NEGATIVE /UL
RBC MAR: 3.61 10^6/UL (ref 4.7–6.1)
SALICYLATES SERPL-MCNC: < 6 MG/DL
SODIUM SERPLBLD-SCNC: 135 MMOL/L (ref 135–145)
SP GR UR STRIP.AUTO: >=1.03 (ref 1–1.03)
UROBILINOGEN UR QL STRIP.AUTO: (no result) E.U./DL
UROBILINOGEN UR STRIP.AUTO-MCNC: NEGATIVE MG/DL
VOLATILE DRUGS POS SERPL SCN: (no result)

## 2018-10-08 PROCEDURE — 83690 ASSAY OF LIPASE: CPT

## 2018-10-08 PROCEDURE — 87086 URINE CULTURE/COLONY COUNT: CPT

## 2018-10-08 PROCEDURE — 70450 CT HEAD/BRAIN W/O DYE: CPT

## 2018-10-08 PROCEDURE — 81003 URINALYSIS AUTO W/O SCOPE: CPT

## 2018-10-08 PROCEDURE — 80053 COMPREHEN METABOLIC PANEL: CPT

## 2018-10-08 PROCEDURE — 83735 ASSAY OF MAGNESIUM: CPT

## 2018-10-08 PROCEDURE — 36415 COLL VENOUS BLD VENIPUNCTURE: CPT

## 2018-10-08 PROCEDURE — 80320 DRUG SCREEN QUANTALCOHOLS: CPT

## 2018-10-08 PROCEDURE — 81001 URINALYSIS AUTO W/SCOPE: CPT

## 2018-10-08 PROCEDURE — 80329 ANALGESICS NON-OPIOID 1 OR 2: CPT

## 2018-10-08 PROCEDURE — 85025 COMPLETE CBC W/AUTO DIFF WBC: CPT

## 2018-10-08 PROCEDURE — 80307 DRUG TEST PRSMV CHEM ANLYZR: CPT

## 2018-10-08 PROCEDURE — 99283 EMERGENCY DEPT VISIT LOW MDM: CPT

## 2018-10-08 PROCEDURE — 80306 DRUG TEST PRSMV INSTRMNT: CPT

## 2018-10-08 NOTE — ED PHYSICIAN DOCUMENTATION
PD HPI Fall





- Stated complaint


Stated Complaint: FALL





- History obtained from


History obtained from: Patient, EMS





- History of Present Illness


Mechanism of injury: Tripped (72-year-old gentleman with dementia who reportedly

lives alone in an apartment.  He fell at [facility] today and has a little scratch 

on his right elbow B.  No serious injuries per EMS report but because of the 

severe dementia they felt he was not safe and needed evaluation.  The patient 

has no specific complaints other than needing to go to the bathroom.  He does 

not remember falling today and does not remember any injuries.)





Review of Systems


Unable to obtain: Dementia





PD PAST MEDICAL HISTORY





- Past Medical History


Cardiovascular: None


Endocrine/Autoimmune: None


Derm: Other





- Past Surgical History


Past Surgical History: No





- Present Medications


Home Medications: 


                                Ambulatory Orders











 Medication  Instructions  Recorded  Confirmed


 


No Known Home Medications  06/18/18 06/18/18














- Allergies


Allergies/Adverse Reactions: 


                                    Allergies











Allergy/AdvReac Type Severity Reaction Status Date / Time


 


Sulfa (Sulfonamide Allergy  Unknown Verified 06/18/18 16:06





Antibiotics)     


 


Penicillins AdvReac  Unknown Verified 06/18/18 16:06














- Social History


Does the pt smoke?: Yes


Smoking Status: Current every day smoker


Does the pt drink ETOH?: Yes


Does the pt have substance abuse?: No





PD ED PE NORMAL





- Vitals


Vital signs reviewed: Yes





- General


General: Other (He is alert and pleasant and follows commands.  He does not 

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 

organizational dynamics.)





- HEENT


HEENT: PERRL, Other (Disconjugate gaze which he says is chronic)





- Neck


Neck: Supple, no meningeal sign, No bony TTP





- Cardiac


Cardiac: RRR, No murmur





- Respiratory


Respiratory: No respiratory distress, Clear bilaterally





- Abdomen


Abdomen: Normal bowel sounds, Soft, Non tender





- Back


Back: No CVA TTP, No spinal TTP





- Derm


Derm: Normal color, Warm and dry





- Extremities


Extremities: Other (There is some dried blood on the right elbow but I do not 

really see a laceration, he has some mild tenderness but no limited range of 

motion.)





- Neuro


Neuro: CNs 2-12 intact


Eye Opening: Spontaneous


Motor: Obeys Commands


Verbal: Confused


GCS Score: 14





Results





- Vitals


Vitals: 


                                     Oxygen











O2 Source                      Room air

















- Rads (name of study)


  ** Ct Head


Radiology: EMP read contemporaneously (Atrophy, NAD, ?NPH)





PD MEDICAL DECISION MAKING





- ED course


ED course: 





This is a very very demented 72-year-old gentleman who lives alone and had a 

fall with no serious injuries.  Obviously given the imaging I am concerned for 

normal pressure hydrocephalus.  Given the living arrangements social work was 

involved and a friend is coming to pick him up and watch him and APS referral 

was made.





- Sepsis Event


Vital Signs: 


                                     Oxygen











O2 Source                      Room air

















Departure





- Departure


Disposition: 01 Home, Self Care


Clinical Impression: 


 NPH (normal pressure hydrocephalus)





Fall


Qualifiers:


 Encounter type: initial encounter Qualified Code(s): W19.XXXA - Unspecified 

fall, initial encounter





Dementia


Qualifiers:


 Dementia type: unspecified type Dementia behavioral disturbance: without 

behavioral disturbance Qualified Code(s): F03.90 - Unspecified dementia without 

behavioral disturbance





Contusion of head


Qualifiers:


 Encounter type: initial encounter Contusion of head detail: scalp Qualified 

Code(s): S00.03XA - Contusion of scalp, initial encounter





Condition: Good


Record reviewed to determine appropriate education?: Yes


Comments: 


He needs a workup for "called normal pressure hydrocephalus, he needs to follow-

up with his physician and have a referral to a neurologist.  [facility] 

particularly adept at this diagnosis and I recommend following up with them.

## 2018-10-08 NOTE — CT REPORT
Reason:  poss head inj

Procedure Date:  10/08/2018   

Accession Number:  [id number] / [id number]                    

Procedure:  CT  - Head W/O CPT Code:  

 

FULL RESULT:

 

 

EXAM:

CT HEAD

 

EXAM DATE: 10/8/2018 04:09 PM.

 

CLINICAL HISTORY: Fall with head injury.

 

COMPARISON: HEAD W/O 08/15/2018 2:34 PM.

 

TECHNIQUE: Multiaxial CT images were obtained from the foramen magnum to 

the vertex. Reformats: Sagittal and coronal. IV contrast: None.

 

In accordance with CT protocol optimization, one or more of the following 

dose reduction techniques were utilized for this exam: automated exposure 

control, adjustment of mA and/or KV based on patient size, or use of 

iterative reconstructive technique.

 

FINDINGS:

Parenchyma: There is diffuse cerebral volume loss. No midline shift or 

mass-effect. Negative for intracranial acute hemorrhage.

 

Extraaxial Spaces:  No subdural or epidural hematoma.

 

Ventricles: There is ventriculomegaly.

 

Sinuses and Orbits: Imaged paranasal sinuses, orbits, and mastoids show 

no significant abnormality.

 

Bones: No evidence of fracture or calvarial defect.

 

Other: None.

IMPRESSION:

1. Moderate cerebral atrophy and volume loss.

2. Negative for acute hemorrhage, localizing edema, or mass-effect.

 

RADIA

## 2019-03-03 ENCOUNTER — HOSPITAL ENCOUNTER (INPATIENT)
Dept: HOSPITAL 76 - ED | Age: 74
LOS: 50 days | Discharge: NURSING, REHAB OR LONG-TERM CARE | DRG: 56 | End: 2019-04-22
Attending: NURSE PRACTITIONER | Admitting: HOSPITALIST
Payer: MEDICARE

## 2019-03-03 ENCOUNTER — HOSPITAL ENCOUNTER (OUTPATIENT)
Dept: HOSPITAL 76 - EMS | Age: 74
Discharge: ANOTHER HOSPITAL | End: 2019-03-03
Attending: SURGERY
Payer: SELF-PAY

## 2019-03-03 DIAGNOSIS — R19.7: ICD-10-CM

## 2019-03-03 DIAGNOSIS — G30.9: Primary | ICD-10-CM

## 2019-03-03 DIAGNOSIS — S02.92XA: ICD-10-CM

## 2019-03-03 DIAGNOSIS — S02.2XXA: ICD-10-CM

## 2019-03-03 DIAGNOSIS — L98.499: ICD-10-CM

## 2019-03-03 DIAGNOSIS — L60.2: ICD-10-CM

## 2019-03-03 DIAGNOSIS — Z91.81: ICD-10-CM

## 2019-03-03 DIAGNOSIS — S00.93XA: ICD-10-CM

## 2019-03-03 DIAGNOSIS — E86.0: ICD-10-CM
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
## 2019-03-03 DIAGNOSIS — F03.90: ICD-10-CM

## 2019-03-03 DIAGNOSIS — J18.8: ICD-10-CM
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
## 2019-03-03 DIAGNOSIS — R27.0: ICD-10-CM

## 2019-03-03 DIAGNOSIS — F17.200: ICD-10-CM

## 2019-03-03 DIAGNOSIS — F02.81: ICD-10-CM

## 2019-03-03 DIAGNOSIS — D72.819: ICD-10-CM

## 2019-03-03 DIAGNOSIS — J01.90: ICD-10-CM

## 2019-03-03 DIAGNOSIS — R41.82: Primary | ICD-10-CM

## 2019-03-03 DIAGNOSIS — E87.6: ICD-10-CM

## 2019-03-03 DIAGNOSIS — S00.91XA: ICD-10-CM

## 2019-03-03 DIAGNOSIS — D75.89: ICD-10-CM

## 2019-03-03 DIAGNOSIS — G91.2: ICD-10-CM

## 2019-03-03 DIAGNOSIS — Z88.2: ICD-10-CM

## 2019-03-03 DIAGNOSIS — D61.818: ICD-10-CM

## 2019-03-03 DIAGNOSIS — J69.0: ICD-10-CM

## 2019-03-03 DIAGNOSIS — Z88.0: ICD-10-CM

## 2019-03-03 DIAGNOSIS — N17.9: ICD-10-CM

## 2019-03-03 PROCEDURE — 83605 ASSAY OF LACTIC ACID: CPT

## 2019-03-03 PROCEDURE — 80048 BASIC METABOLIC PNL TOTAL CA: CPT

## 2019-03-03 PROCEDURE — 51701 INSERT BLADDER CATHETER: CPT

## 2019-03-03 PROCEDURE — 80053 COMPREHEN METABOLIC PANEL: CPT

## 2019-03-03 PROCEDURE — 87205 SMEAR GRAM STAIN: CPT

## 2019-03-03 PROCEDURE — 87493 C DIFF AMPLIFIED PROBE: CPT

## 2019-03-03 PROCEDURE — 87040 BLOOD CULTURE FOR BACTERIA: CPT

## 2019-03-03 PROCEDURE — 92610 EVALUATE SWALLOWING FUNCTION: CPT

## 2019-03-03 PROCEDURE — 87086 URINE CULTURE/COLONY COUNT: CPT

## 2019-03-03 PROCEDURE — 81003 URINALYSIS AUTO W/O SCOPE: CPT

## 2019-03-03 PROCEDURE — 80306 DRUG TEST PRSMV INSTRMNT: CPT

## 2019-03-03 PROCEDURE — 96367 TX/PROPH/DG ADDL SEQ IV INF: CPT

## 2019-03-03 PROCEDURE — 72125 CT NECK SPINE W/O DYE: CPT

## 2019-03-03 PROCEDURE — 83735 ASSAY OF MAGNESIUM: CPT

## 2019-03-03 PROCEDURE — 36415 COLL VENOUS BLD VENIPUNCTURE: CPT

## 2019-03-03 PROCEDURE — 71045 X-RAY EXAM CHEST 1 VIEW: CPT

## 2019-03-03 PROCEDURE — 97161 PT EVAL LOW COMPLEX 20 MIN: CPT

## 2019-03-03 PROCEDURE — 85025 COMPLETE CBC W/AUTO DIFF WBC: CPT

## 2019-03-03 PROCEDURE — 93005 ELECTROCARDIOGRAM TRACING: CPT

## 2019-03-03 PROCEDURE — 83690 ASSAY OF LIPASE: CPT

## 2019-03-03 PROCEDURE — 80202 ASSAY OF VANCOMYCIN: CPT

## 2019-03-03 PROCEDURE — 70450 CT HEAD/BRAIN W/O DYE: CPT

## 2019-03-03 PROCEDURE — 99285 EMERGENCY DEPT VISIT HI MDM: CPT

## 2019-03-03 PROCEDURE — 92526 ORAL FUNCTION THERAPY: CPT

## 2019-03-03 PROCEDURE — 97116 GAIT TRAINING THERAPY: CPT

## 2019-03-03 PROCEDURE — 96366 THER/PROPH/DIAG IV INF ADDON: CPT

## 2019-03-03 PROCEDURE — 97530 THERAPEUTIC ACTIVITIES: CPT

## 2019-03-03 PROCEDURE — 80069 RENAL FUNCTION PANEL: CPT

## 2019-03-03 PROCEDURE — 70486 CT MAXILLOFACIAL W/O DYE: CPT

## 2019-03-03 PROCEDURE — 82550 ASSAY OF CK (CPK): CPT

## 2019-03-03 PROCEDURE — 87070 CULTURE OTHR SPECIMN AEROBIC: CPT

## 2019-03-03 PROCEDURE — 96365 THER/PROPH/DIAG IV INF INIT: CPT

## 2019-03-03 PROCEDURE — 81001 URINALYSIS AUTO W/SCOPE: CPT

## 2019-03-03 PROCEDURE — 85610 PROTHROMBIN TIME: CPT

## 2019-03-03 PROCEDURE — 84484 ASSAY OF TROPONIN QUANT: CPT

## 2019-03-04 LAB
ALBUMIN DIAFP-MCNC: 3.6 G/DL (ref 3.2–5.5)
ALBUMIN/GLOB SERPL: 1.1 {RATIO} (ref 1–2.2)
ALP SERPL-CCNC: 121 IU/L (ref 42–121)
ALT SERPL W P-5'-P-CCNC: 18 IU/L (ref 10–60)
AMPHET UR QL SCN: NEGATIVE
ANION GAP SERPL CALCULATED.4IONS-SCNC: 10 MMOL/L (ref 6–13)
AST SERPL W P-5'-P-CCNC: 23 IU/L (ref 10–42)
BASOPHILS NFR BLD AUTO: 0.1 10^3/UL (ref 0–0.1)
BASOPHILS NFR BLD AUTO: 0.8 %
BENZODIAZ UR QL SCN: NEGATIVE
BILIRUB BLD-MCNC: 0.5 MG/DL (ref 0.2–1)
BUN SERPL-MCNC: 16 MG/DL (ref 6–20)
CALCIUM UR-MCNC: 8.9 MG/DL (ref 8.5–10.3)
CHLORIDE SERPL-SCNC: 104 MMOL/L (ref 101–111)
CLARITY UR REFRACT.AUTO: CLEAR
CO2 SERPL-SCNC: 27 MMOL/L (ref 21–32)
COCAINE UR-SCNC: NEGATIVE UMOL/L
CREAT SERPLBLD-SCNC: 0.9 MG/DL (ref 0.6–1.2)
EOSINOPHIL # BLD AUTO: 0.3 10^3/UL (ref 0–0.7)
EOSINOPHIL NFR BLD AUTO: 4.8 %
ERYTHROCYTE [DISTWIDTH] IN BLOOD BY AUTOMATED COUNT: 15.8 % (ref 12–15)
GFRSERPLBLD MDRD-ARVRAT: 82 ML/MIN/{1.73_M2} (ref 89–?)
GLOBULIN SER-MCNC: 3.3 G/DL (ref 2.1–4.2)
GLUCOSE SERPL-MCNC: 118 MG/DL (ref 70–100)
GLUCOSE UR QL STRIP.AUTO: NEGATIVE MG/DL
HGB UR QL STRIP: 12.7 G/DL (ref 14–18)
KETONES UR QL STRIP.AUTO: NEGATIVE MG/DL
LIPASE SERPL-CCNC: 28 U/L (ref 22–51)
LYMPHOCYTES # SPEC AUTO: 0.6 10^3/UL (ref 1.5–3.5)
LYMPHOCYTES NFR BLD AUTO: 9.2 %
MCH RBC QN AUTO: 35 PG (ref 27–31)
MCHC RBC AUTO-ENTMCNC: 33.7 G/DL (ref 32–36)
MCV RBC AUTO: 103.6 FL (ref 80–94)
METHADONE UR QL SCN: NEGATIVE
METHAMPHET UR QL SCN: NEGATIVE
MONOCYTES # BLD AUTO: 0.4 10^3/UL (ref 0–1)
MONOCYTES NFR BLD AUTO: 5.7 %
NEUTROPHILS # BLD AUTO: 5.5 10^3/UL (ref 1.5–6.6)
NEUTROPHILS # SNV AUTO: 6.9 X10^3/UL (ref 4.8–10.8)
NEUTROPHILS NFR BLD AUTO: 79.5 %
NITRITE UR QL STRIP.AUTO: NEGATIVE
OPIATES UR QL SCN: NEGATIVE
PDW BLD AUTO: 8.5 FL (ref 7.4–11.4)
PH UR STRIP.AUTO: 5.5 PH (ref 5–7.5)
PLATELET # BLD: 166 10^3/UL (ref 130–450)
PROT SPEC-MCNC: 6.9 G/DL (ref 6.7–8.2)
PROT UR STRIP.AUTO-MCNC: (no result) MG/DL
RBC # UR STRIP.AUTO: (no result) /UL
RBC MAR: 3.65 10^6/UL (ref 4.7–6.1)
SODIUM SERPLBLD-SCNC: 141 MMOL/L (ref 135–145)
SP GR UR STRIP.AUTO: >=1.03 (ref 1–1.03)
UROBILINOGEN UR QL STRIP.AUTO: (no result) E.U./DL
UROBILINOGEN UR STRIP.AUTO-MCNC: NEGATIVE MG/DL
VOLATILE DRUGS POS SERPL SCN: (no result)

## 2019-03-04 NOTE — ED PHYSICIAN DOCUMENTATION
History of Present Illness





- Stated complaint


Stated Complaint: CONFUSED





- Chief complaint


Chief Complaint: Neuro





- History obtained from


History obtained from: Patient, EMS





- History of Present Illness


Timing: Today


Pain level now: 0





- Additonal information


Additional information: 





Patient was found on ground near street, passerby called 911. Patient told 

medics that he was walking from a friend's house back home. He does not recall 

falling. Medics note that patient was found walking away from his house from the

location he said he was walking from. Patient has no c/o at this time. 





Review of Systems


Cardiac: reports: Reviewed and negative


Respiratory: reports: Reviewed and negative


GI: reports: Reviewed and negative


Musculoskeletal: reports: Reviewed and negative


Neurologic: reports: Reviewed and negative





PD PAST MEDICAL HISTORY





- Past Medical History


Past Medical History: No


Cardiovascular: None


Respiratory: None


Neuro: None


Endocrine/Autoimmune: None


GI: None


: None


HEENT: None


Psych: None


Musculoskeletal: None


Derm: Other





- Past Surgical History


Past Surgical History: No





- Present Medications


Home Medications: 


                                Ambulatory Orders











 Medication  Instructions  Recorded  Confirmed


 


No Known Home Medications  06/18/18 06/18/18














- Allergies


Allergies/Adverse Reactions: 


                                    Allergies











Allergy/AdvReac Type Severity Reaction Status Date / Time


 


Sulfa (Sulfonamide Allergy  Unknown Verified 06/18/18 16:06





Antibiotics)     


 


Penicillins AdvReac  Unknown Verified 06/18/18 16:06














- Social History


Does the pt smoke?: Yes


Smoking Status: Current every day smoker


Does the pt drink ETOH?: Yes


Does the pt have substance abuse?: No





- Immunizations


Immunizations are current?: No


Immunizations: TDAP >10years/unknown





- POLST


Patient has POLST: No





PD ED PE NORMAL





- Vitals


Vital signs reviewed: Yes





- General


General: No acute distress, Well developed/nourished, Other (awake, alert, 

oriented to self. Does not know year. He knows he is in "the hospital" (per 

patient), but does not know which one nor town)





- HEENT


HEENT: Atraumatic, PERRL, EOMI, Moist mucous membranes





- Neck


Neck: Supple, no meningeal sign, No bony TTP





- Cardiac


Cardiac: RRR, No murmur





- Respiratory


Respiratory: No respiratory distress, Clear bilaterally





- Abdomen


Abdomen: Soft, Non tender





- Back


Back: No spinal TTP





- Derm


Derm: Normal color, Warm and dry





- Extremities


Extremities: Normal ROM s pain, No edema, Other (right wrist and forearm 

abrasions without bony tenderness. the right wrist abrasions are to the ventral 

surface, the forearm abrasions on dorsal aspect. FROM right wrist, fingers, 

elbow, and shoulder.)





- Neuro


Neuro: CNs 2-12 intact, No motor deficit, No sensory deficit, Normal speech


Eye Opening: Spontaneous


Motor: Obeys Commands


Verbal: Confused


GCS Score: 14





- Psych


Psych: Normal mood, Normal affect





Results





- Vitals


Vitals: 


                               Vital Signs - 24 hr











  03/04/19 03/04/19 03/04/19





  12:58 15:08 18:54


 


Heart Rate 66 57 L 56 L


 


Respiratory 16 16 16





Rate   


 


Blood Pressure 133/76 H 168/73 H 147/82 H


 


O2 Saturation 97 97 100














  03/04/19 03/04/19 03/05/19





  21:47 23:43 08:34


 


Heart Rate 58 L 67 55 L


 


Respiratory 16 16 16





Rate   


 


Blood Pressure 137/72 H 130/72 136/62 H


 


O2 Saturation 98 99 96








                                     Oxygen











O2 Source                      Room air

















- Labs


Labs: 


                                Laboratory Tests











  03/04/19 03/04/19 03/04/19





  00:00 00:55 00:55


 


WBC   6.9 


 


RBC   3.65 L 


 


Hgb   12.7 L 


 


Hct   37.8 L 


 


MCV   103.6 H 


 


MCH   35.0 H 


 


MCHC   33.7 


 


RDW   15.8 H 


 


Plt Count   166 


 


MPV   8.5 


 


Neut # (Auto)   5.5 


 


Lymph # (Auto)   0.6 L 


 


Mono # (Auto)   0.4 


 


Eos # (Auto)   0.3 


 


Baso # (Auto)   0.1 


 


Absolute Nucleated RBC   0.00 


 


Nucleated RBC %   0.0 


 


Sodium    141


 


Potassium    4.1


 


Chloride    104


 


Carbon Dioxide    27


 


Anion Gap    10.0


 


BUN    16


 


Creatinine    0.9


 


Estimated GFR (MDRD)    82 L


 


Glucose    118 H


 


Calcium    8.9


 


Total Bilirubin    0.5


 


AST    23


 


ALT    18


 


Alkaline Phosphatase    121


 


Total Protein    6.9


 


Albumin    3.6


 


Globulin    3.3


 


Albumin/Globulin Ratio    1.1


 


Lipase    28


 


Urine Color  YELLOW  


 


Urine Clarity  CLEAR  


 


Urine pH  5.5  


 


Ur Specific Gravity  >=1.030 H  


 


Urine Protein  TRACE  


 


Urine Glucose (UA)  NEGATIVE  


 


Urine Ketones  NEGATIVE  


 


Urine Occult Blood  TRACE-LYSE  


 


Urine Nitrite  NEGATIVE  


 


Urine Bilirubin  NEGATIVE  


 


Urine Urobilinogen  0.2 (NORMAL)  


 


Ur Leukocyte Esterase  NEGATIVE  


 


Ur Microscopic Review  NOT INDICATED  


 


Urine Culture Comments  NOT INDICATED  


 


Urine Opiates Screen  NEGATIVE  


 


Ur Oxycodone Screen  NEGATIVE  


 


Urine Methadone Screen  NEGATIVE  


 


Ur Propoxyphene Screen  NEGATIVE  


 


Ur Barbiturates Screen  NEGATIVE  


 


Ur Tricyclics Screen  NEGATIVE  


 


Ur Phencyclidine Scrn  NEGATIVE  


 


Ur Amphetamine Screen  NEGATIVE  


 


U Methamphetamines Scrn  NEGATIVE  


 


U Benzodiazepines Scrn  NEGATIVE  


 


Urine Cocaine Screen  NEGATIVE  


 


U Cannabinoids Screen  NEGATIVE  














PD MEDICAL DECISION MAKING





- ED course


Complexity details: reviewed old records, reviewed results, re-evaluated 

patient, considered differential, d/w patient


ED course: 





NAD, pleasant, but confused. Previous ED note from October 2018 seems to 

indicate this is baseline mental status for this patient. However, he lives 

alone and thus held until AM for SW consult.

## 2019-03-06 LAB
ALBUMIN DIAFP-MCNC: 3.4 G/DL (ref 3.2–5.5)
ALBUMIN/GLOB SERPL: 1.1 {RATIO} (ref 1–2.2)
ALP SERPL-CCNC: 113 IU/L (ref 42–121)
ALT SERPL W P-5'-P-CCNC: 13 IU/L (ref 10–60)
ANION GAP SERPL CALCULATED.4IONS-SCNC: 9 MMOL/L (ref 6–13)
AST SERPL W P-5'-P-CCNC: 20 IU/L (ref 10–42)
BASOPHILS # BLD MANUAL: 0 10^3/UL (ref 0–0.1)
BASOPHILS NFR BLD AUTO: 2 %
BASOPHILS NFR SPEC MANUAL: 1 %
BILIRUB BLD-MCNC: 0.7 MG/DL (ref 0.2–1)
BUN SERPL-MCNC: 15 MG/DL (ref 6–20)
CALCIUM UR-MCNC: 8.7 MG/DL (ref 8.5–10.3)
CHLORIDE SERPL-SCNC: 101 MMOL/L (ref 101–111)
CK SERPL-CCNC: 122 IU/L (ref 22–269)
CO2 SERPL-SCNC: 28 MMOL/L (ref 21–32)
CREAT SERPLBLD-SCNC: 0.8 MG/DL (ref 0.6–1.2)
EOSINOPHIL # BLD MANUAL: 0.1 10^3/UL (ref 0–0.7)
EOSINOPHIL NFR BLD AUTO: 5.7 %
ERYTHROCYTE [DISTWIDTH] IN BLOOD BY AUTOMATED COUNT: 15.4 % (ref 12–15)
GFRSERPLBLD MDRD-ARVRAT: 94 ML/MIN/{1.73_M2} (ref 89–?)
GLOBULIN SER-MCNC: 3.1 G/DL (ref 2.1–4.2)
GLUCOSE SERPL-MCNC: 108 MG/DL (ref 70–100)
HGB UR QL STRIP: 13.2 G/DL (ref 14–18)
LIPASE SERPL-CCNC: 26 U/L (ref 22–51)
LYMPH ABN NFR BLD MANUAL: 0 %
LYMPHOBLASTS # BLD: 14 %
LYMPHOCYTES # BLD MANUAL: 0.9 10^3/UL (ref 1.5–3.5)
LYMPHOCYTES NFR BLD AUTO: 13.9 %
MANUAL DIF COMMENT BLD-IMP: (no result)
MCH RBC QN AUTO: 35.2 PG (ref 27–31)
MCHC RBC AUTO-ENTMCNC: 33.9 G/DL (ref 32–36)
MCV RBC AUTO: 103.9 FL (ref 80–94)
MONOCYTES # BLD MANUAL: 0.3 10^3/UL (ref 0–1)
MONOCYTES NFR BLD AUTO: 7.4 %
NEUTROPHILS # SNV AUTO: 4.3 X10^3/UL (ref 4.8–10.8)
NEUTROPHILS NFR BLD AUTO: 71 %
NEUTROPHILS NFR BLD MANUAL: 3 10^3/UL (ref 1.5–6.6)
NEUTS BAND NFR BLD MANUAL: 69 %
NEUTS BAND NFR BLD: 0 %
PDW BLD AUTO: 8.7 FL (ref 7.4–11.4)
PLAT MORPH BLD: (no result)
PLATELET # BLD: 183 10^3/UL (ref 130–450)
PROT SPEC-MCNC: 6.5 G/DL (ref 6.7–8.2)
RBC MAR: 3.76 10^6/UL (ref 4.7–6.1)
SODIUM SERPLBLD-SCNC: 138 MMOL/L (ref 135–145)

## 2019-03-06 NOTE — CT REPORT
Reason:  trauma

Procedure Date:  03/06/2019   

Accession Number:  [id number] / [id number]                    

Procedure:  CT  - HEAD WO CPT Code:  

 

FULL RESULT:

 

 

EXAM:

CT HEAD

 

EXAM DATE: 3/6/2019 08:31 AM.

 

CLINICAL HISTORY: Head pain status post head trauma.

 

COMPARISON: HEAD W/O 10/08/2018 4:01 PM.

 

TECHNIQUE: Multiaxial CT images were obtained from the foramen magnum to 

the vertex. Reformats: Sagittal and coronal. IV contrast: None.

 

In accordance with CT protocol optimization, one or more of the following 

dose reduction techniques were utilized for this exam: automated exposure 

control, adjustment of mA and/or KV based on patient size, or use of 

iterative reconstructive technique.

 

FINDINGS:

Parenchyma: No intraparenchymal hemorrhage. No evidence of mass or 

mass-effect. Gray-white differentiation is distinct. Diffuse chronic 

microangiopathic white matter changes are evident.

 

Extraaxial Spaces: Normal for age. No subdural or epidural collections 

identified.

 

Ventricles: Stable hydrocephalus without transependymal CSF migration of 

fluid.

 

Sinuses and Orbits: Middle ear cavities and mastoid air cells are clear. 

There is mucosal thickening in bilateral ethmoid air cells. There is 

hyperdense fluid within the left maxillary sinus secondary to fractures 

of the inferior wall of the left orbit and the lateral wall of the left 

maxillary sinus. There is air in the left masseter space. No fracture of 

the left zygoma or pterygoid plates. Intraorbital contents are within 

normal limits.

 

Bones: Acute left nasal bone fracture with regional soft tissue swelling. 

No other calvarial fracture.

 

Other: None.

IMPRESSION:

1. No acute intracranial hemorrhage.

2. Stable hydrocephalus.

3. Fractures of the superior and lateral walls of the left maxillary 

sinus with hyperdense fluid in the left maxillary sinus. Associated air 

in the left masseter space.

4. Acute left nasal bone fracture with regional soft tissue swelling.

 

RADIA

## 2019-03-06 NOTE — ED PHYSICIAN DOCUMENTATION
PD HPI Fall





- Stated complaint


Stated Complaint: CONFUSED





- Chief complaint


Chief Complaint: Neuro





- Additional information


Additional information: 


73-year-old male who is currently boarding the emergency department pending 

placement by social work. The patient's care was turned over to me at 7 AM by 

Dr. Blanco.  At change of shift nursing went to check on the patient and found 

him on the ground.  The patient is significantly confused and unable to 

explained why he fell or when.  The patient has injury to his head and face.  

The patient appears to have old bruises on his extremities but no other area of 

acute injury.  The patient is able to move all major joints and has no 

complaints of extremity pain.








Review of Systems


Unable to obtain: Confused, Dementia





PD PAST MEDICAL HISTORY





- Past Medical History


Past Medical History: No


Cardiovascular: None


Respiratory: None


Neuro: None


Endocrine/Autoimmune: None


GI: None


: None


HEENT: None


Psych: None


Musculoskeletal: None


Derm: Other





- Past Surgical History


Past Surgical History: No





- Present Medications


Home Medications: 


                                Ambulatory Orders











 Medication  Instructions  Recorded  Confirmed


 


No Known Home Medications  06/18/18 06/18/18














- Allergies


Allergies/Adverse Reactions: 


                                    Allergies











Allergy/AdvReac Type Severity Reaction Status Date / Time


 


Sulfa (Sulfonamide Allergy  Unknown Verified 06/18/18 16:06





Antibiotics)     


 


Penicillins AdvReac  Unknown Verified 06/18/18 16:06














- Social History


Does the pt smoke?: Yes


Smoking Status: Current every day smoker


Does the pt drink ETOH?: Yes


Does the pt have substance abuse?: No





- Immunizations


Immunizations are current?: No


Immunizations: TDAP >10years/unknown





- POLST


Patient has POLST: No





PD ED PE NORMAL





- General


General: Other (The patient's alert, significantly confused and unable to 

provide any history regarding the events)





- Cardiac


Cardiac: RRR, Strong equal pulses





- Respiratory


Respiratory: No respiratory distress





- Abdomen


Abdomen: Soft, Non tender





- Derm


Derm: Other (Patient appears to have old bruises on the extremities)





- Extremities


Extremities: No deformity, No tenderness to palpate, Normal ROM s pain





- Neuro


Neuro: Other (The patient is significantly confused, the patient does cooperate 

and answers questions to the best of his ability and follows commands.  The 

patient moves all 4 extremities and has equal  strength and there is no 

evidence of acute Focal changes)





PD ED PE EXPANDED





- HEENT


HEENT: Head injury


HEENT Visual: 


                            __________________________














                            __________________________





 1 - deformity (The patient has deformity to the nose, there is an area of 

ulceration which is chronic and there is no acute or laceration.  The patient is

unable to give any surrounding information regarding the chronic ulceration on 

his nose)





 2 - abrasion (Skin tear)





 3 - bruising








- Neck


Neck: Other (Unable to assess secondary to the confusion so a CT scan will be 

done to rule out cervical spine fracture)





Results





- Vitals


Vitals: 


                               Vital Signs - 24 hr











  03/05/19 03/06/19 03/06/19





  22:37 08:05 10:14


 


Temperature 36.5 C  36.6 C


 


Heart Rate 68 72 63


 


Respiratory 16 14 18





Rate   


 


Blood Pressure 130/75 152/74 H 118/69


 


O2 Saturation 100 100 99














  03/06/19 03/06/19





  12:19 14:24


 


Temperature  


 


Heart Rate 83 76


 


Respiratory 18 18





Rate  


 


Blood Pressure 150/73 H 122/82 H


 


O2 Saturation 100 99








                                     Oxygen











O2 Source                      Room air

















- Labs


Labs: 


                                Laboratory Tests











  03/04/19 03/04/19 03/04/19





  00:00 00:55 00:55


 


WBC   6.9 


 


RBC   3.65 L 


 


Hgb   12.7 L 


 


Hct   37.8 L 


 


MCV   103.6 H 


 


MCH   35.0 H 


 


MCHC   33.7 


 


RDW   15.8 H 


 


Plt Count   166 


 


MPV   8.5 


 


Neut # (Auto)   5.5 


 


Lymph # (Auto)   0.6 L 


 


Mono # (Auto)   0.4 


 


Eos # (Auto)   0.3 


 


Baso # (Auto)   0.1 


 


Absolute Nucleated RBC   0.00 


 


Total Counted   


 


Band Neuts % (Manual)   


 


Reactive Lymphs % (Man)   


 


Abnorm Lymph % (Manual)   


 


Nucleated RBC %   0.0 


 


Neutrophils # (Manual)   


 


Lymphocytes # (Manual)   


 


Monocytes # (Manual)   


 


Eosinophils # (Manual)   


 


Basophils # (Manual)   


 


Differential Comment   


 


Manual Slide Review   


 


Platelet Morphology   


 


Sodium    141


 


Potassium    4.1


 


Chloride    104


 


Carbon Dioxide    27


 


Anion Gap    10.0


 


BUN    16


 


Creatinine    0.9


 


Estimated GFR (MDRD)    82 L


 


Glucose    118 H


 


Calcium    8.9


 


Total Bilirubin    0.5


 


AST    23


 


ALT    18


 


Alkaline Phosphatase    121


 


Total Creatine Kinase   


 


Troponin I   


 


Total Protein    6.9


 


Albumin    3.6


 


Globulin    3.3


 


Albumin/Globulin Ratio    1.1


 


Lipase    28


 


Urine Color  YELLOW  


 


Urine Clarity  CLEAR  


 


Urine pH  5.5  


 


Ur Specific Gravity  >=1.030 H  


 


Urine Protein  TRACE  


 


Urine Glucose (UA)  NEGATIVE  


 


Urine Ketones  NEGATIVE  


 


Urine Occult Blood  TRACE-LYSE  


 


Urine Nitrite  NEGATIVE  


 


Urine Bilirubin  NEGATIVE  


 


Urine Urobilinogen  0.2 (NORMAL)  


 


Ur Leukocyte Esterase  NEGATIVE  


 


Ur Microscopic Review  NOT INDICATED  


 


Urine Culture Comments  NOT INDICATED  


 


Urine Opiates Screen  NEGATIVE  


 


Ur Oxycodone Screen  NEGATIVE  


 


Urine Methadone Screen  NEGATIVE  


 


Ur Propoxyphene Screen  NEGATIVE  


 


Ur Barbiturates Screen  NEGATIVE  


 


Ur Tricyclics Screen  NEGATIVE  


 


Ur Phencyclidine Scrn  NEGATIVE  


 


Ur Amphetamine Screen  NEGATIVE  


 


U Methamphetamines Scrn  NEGATIVE  


 


U Benzodiazepines Scrn  NEGATIVE  


 


Urine Cocaine Screen  NEGATIVE  


 


U Cannabinoids Screen  NEGATIVE  














  03/06/19 03/06/19 03/06/19





  08:37 08:37 08:37


 


WBC  4.3 L  


 


RBC  3.76 L  


 


Hgb  13.2 L  


 


Hct  39.0 L  


 


MCV  103.9 H  


 


MCH  35.2 H  


 


MCHC  33.9  


 


RDW  15.4 H  


 


Plt Count  183  


 


MPV  8.7  


 


Neut # (Auto)  Not Reportable  


 


Lymph # (Auto)  Not Reportable  


 


Mono # (Auto)  Not Reportable  


 


Eos # (Auto)  Not Reportable  


 


Baso # (Auto)  Not Reportable  


 


Absolute Nucleated RBC  Not Reportable  


 


Total Counted  100  


 


Band Neuts % (Manual)  0  


 


Reactive Lymphs % (Man)  6  


 


Abnorm Lymph % (Manual)  0  


 


Nucleated RBC %  Not Reportable  


 


Neutrophils # (Manual)  3.0  


 


Lymphocytes # (Manual)  0.9 L  


 


Monocytes # (Manual)  0.3  


 


Eosinophils # (Manual)  0.1  


 


Basophils # (Manual)  0.0  


 


Differential Comment  MANUAL DIFFERENTIAL  


 


Manual Slide Review  Indicated  


 


Platelet Morphology  RARE GIANT PLATELETS  


 


Sodium   138 


 


Potassium   3.9 


 


Chloride   101 


 


Carbon Dioxide   28 


 


Anion Gap   9.0 


 


BUN   15 


 


Creatinine   0.8 


 


Estimated GFR (MDRD)   94 


 


Glucose   108 H 


 


Calcium   8.7 


 


Total Bilirubin   0.7 


 


AST   20 


 


ALT   13 


 


Alkaline Phosphatase   113 


 


Total Creatine Kinase   122 


 


Troponin I    < 0.04


 


Total Protein   6.5 L 


 


Albumin   3.4 


 


Globulin   3.1 


 


Albumin/Globulin Ratio   1.1 


 


Lipase   26 


 


Urine Color   


 


Urine Clarity   


 


Urine pH   


 


Ur Specific Gravity   


 


Urine Protein   


 


Urine Glucose (UA)   


 


Urine Ketones   


 


Urine Occult Blood   


 


Urine Nitrite   


 


Urine Bilirubin   


 


Urine Urobilinogen   


 


Ur Leukocyte Esterase   


 


Ur Microscopic Review   


 


Urine Culture Comments   


 


Urine Opiates Screen   


 


Ur Oxycodone Screen   


 


Urine Methadone Screen   


 


Ur Propoxyphene Screen   


 


Ur Barbiturates Screen   


 


Ur Tricyclics Screen   


 


Ur Phencyclidine Scrn   


 


Ur Amphetamine Screen   


 


U Methamphetamines Scrn   


 


U Benzodiazepines Scrn   


 


Urine Cocaine Screen   


 


U Cannabinoids Screen   














- Rads (name of study)


  ** CT head/face/neck


Radiology: Final report received, See rad report (IMPRESSION: 1. No acute 

intracranial hemorrhage. 2. Stable hydrocephalus. 3. Fractures of the superior 

and lateral walls of the left maxillary sinus with hyperdense fluid in the left 

maxillary sinus. Associated air in the left masseter space. 4. Acute left nasal 

bone fracture with regional soft tissue swelling. IMPRESSION: 1. No acute fr

acture or subluxation. 2. Degenerative disk disease and facet arthrosis, 

detailed above. 3. Carotid arteriosclerosis. )





PD MEDICAL DECISION MAKING





- ED course


ED course: 


The acute findings were discussed with the on-call neurosurgery team and ENT 

team at [facility].  They both independently reviewed the images.

 Both of these findings are appropriate for outpatient management and they would

gladly see the patient as an outpatient to further address these findings.





Due to the patient's confusion and fall in the emergency department he is unsafe

currently for discharge home.  Social work has been working with the patient to 

attempt placement.  The hospitalist Dr. Thornton was contacted for admission to 

the hospital since the patient is unsafe for discharge and is requiring more 

services than the emergency department staff can provide.  Dr. Thornton will 

evaluate if the patient will be admitted to the hospital.





The patient's care was turned over to Dr. Parks At 1800








Departure





- Departure


Clinical Impression: 


 Confusion, NPH (normal pressure hydrocephalus)





Facial contusion


Qualifiers:


 Encounter type: initial encounter Qualified Code(s): S00.83XA - Contusion of 

other part of head, initial encounter





Closed head injury


Qualifiers:


 Encounter type: initial encounter Qualified Code(s): S09.90XA - Unspecified 

injury of head, initial encounter





Fall


Qualifiers:


 Encounter type: initial encounter Qualified Code(s): W19.XXXA - Unspecified 

fall, initial encounter





Facial fracture


Qualifiers:


 Encounter type: initial encounter Facial bone/location: other facial bone 

Fracture type: closed Laterality: unspecified laterality Qualified Code(s): 

S02.80XA - Fracture of other specified skull and facial bones, unspecified side,

initial encounter for closed fracture





Dementia


Qualifiers:


 Dementia type: unspecified type Dementia behavioral disturbance: without 

behavioral disturbance Qualified Code(s): F03.90 - Unspecified dementia without 

behavioral disturbance





Skin ulcer of face


Qualifiers:


 Non-pressure ulcer stage: unspecified non-pressure ulcer stage Qualified 

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 

unspecified severity





Facial abrasion


Qualifiers:


 Encounter type: initial encounter Qualified Code(s): S00.81XA - Abrasion of 

other part of head, initial encounter





Comments: 


Please follow-up with plastic surgery at [facility] for further 

evaluation of your facial fractures.  Please call [phone]





Please follow-up with Dr. Matias Beltran at [facility] with the 

neurosurgery clinic for further evaluation of your normal pressure 

hydrocephalus.  Please call area code [phone] to schedule appointment

## 2019-03-06 NOTE — CT REPORT
Reason:  trauma

Procedure Date:  03/06/2019   

Accession Number:  [id number] / [id number]                    

Procedure:  CT  - CERVICAL SPINE WO CPT Code:  

 

FULL RESULT:

 

 

EXAM:

CT CERVICAL SPINE WITHOUT CONTRAST

 

DATE: 3/6/2019 08:31 AM.

 

HISTORY: Acute neck pain status post trauma.

 

COMPARISONS: None.

 

TECHNIQUE: Thin-section axial images were acquired of the cervical spine 

without contrast. Post-processing: Coronal and sagittal reformats. Other: 

None.

 

In accordance with CT protocol optimization, one or more of the following 

dose reduction techniques were utilized for this exam: automated exposure 

control, adjustment of mA and/or KV based on patient size, or use of 

iterative reconstructive technique.

 

FINDINGS:

Alignment: Accentuated cervical lordosis. No vertebral body subluxation. 

No scoliosis.

 

Bones: Decreased mineralization of the bones. No fracture. No focal bone 

lesion. No congenital vertebral anomaly.

 

Interspace Levels/Facets: Disk space narrowing at C5-C6 and C6-C7 with 

marginal osteophytes at C6-C7. Facet arthrosis on the right at C3-C4.

 

Musculature: Normal. No fatty atrophy.

 

Other: Because of established carotid plaques in the carotid arteries. 

Paraspinal soft tissues are otherwise unremarkable. Bilateral apical 

pleural parenchymal scarring.

IMPRESSION:

1. No acute fracture or subluxation.

2. Degenerative disk disease and facet arthrosis, detailed above.

3. Carotid arteriosclerosis.

 

RADIA

## 2019-03-06 NOTE — CT REPORT
Reason:  trauma

Procedure Date:  03/06/2019   

Accession Number:  [id number] / [id number]                    

Procedure:  CT  - MAXILLOFACIAL WO CPT Code:  

 

FULL RESULT:

 

 

EXAM:

CT MAXILLOFACIAL WITHOUT CONTRAST

 

EXAM DATE: 3/6/2019 08:31 AM.

 

CLINICAL HISTORY: Left-sided facial pain and swelling status post trauma.

 

COMPARISONS: None.

 

TECHNIQUE: Thin-section axial images were acquired of the face without 

contrast. Post-processing: Coronal and sagittal reformats. Other: None.

 

In accordance with CT protocol optimization, one or more of the following 

dose reduction techniques were utilized for this exam: automated exposure 

control, adjustment of mA and/or KV based on patient size, or use of 

iterative reconstructive technique.

 

FINDINGS:

Soft Tissue: There is air in the left  space. There is left 

nasal, left preseptal periorbital and left supraorbital frontal scalp 

soft tissue swelling.

 

Orbits: Globes and intraorbital structures are within normal limits.

 

Bones: Left nasal bone fracture with regional soft tissue swelling. 

Nondisplaced fractures of the superior and lateral walls of the left 

maxillary sinus with hyperdense material within the left maxillary sinus 

indicating blood.

 

Temporomandibular Joints: The temporomandibular joints are symmetric and 

normally located.

 

Sinuses: Normal. No mucosal thickening or fluid levels.

 

Other: Middle ear cavities and mastoid air cells are clear. There is soft 

tissue attenuation in the cartilaginous portion of the left external 

auditory canal.

IMPRESSION:

1. Left nasal bone fracture with regional soft tissue swelling and 

irregularity.

2. Fractures of the superior and lateral walls of the left maxillary 

sinus, nondisplaced.

3. Air in the left  space.

4. Left facial, left preseptal periorbital and left supraorbital frontal 

scalp soft tissue swelling.

 

RADIA

## 2019-03-07 LAB
ALBUMIN DIAFP-MCNC: 3.3 G/DL (ref 3.2–5.5)
ALBUMIN/GLOB SERPL: 1.1 {RATIO} (ref 1–2.2)
ALP SERPL-CCNC: 109 IU/L (ref 42–121)
ALT SERPL W P-5'-P-CCNC: 13 IU/L (ref 10–60)
ANION GAP SERPL CALCULATED.4IONS-SCNC: 7 MMOL/L (ref 6–13)
AST SERPL W P-5'-P-CCNC: 18 IU/L (ref 10–42)
BASOPHILS NFR BLD AUTO: 0.1 10^3/UL (ref 0–0.1)
BASOPHILS NFR BLD AUTO: 2 %
BILIRUB BLD-MCNC: 0.6 MG/DL (ref 0.2–1)
BUN SERPL-MCNC: 15 MG/DL (ref 6–20)
CALCIUM UR-MCNC: 8.6 MG/DL (ref 8.5–10.3)
CHLORIDE SERPL-SCNC: 104 MMOL/L (ref 101–111)
CO2 SERPL-SCNC: 29 MMOL/L (ref 21–32)
CREAT SERPLBLD-SCNC: 0.9 MG/DL (ref 0.6–1.2)
EOSINOPHIL # BLD AUTO: 0.3 10^3/UL (ref 0–0.7)
EOSINOPHIL NFR BLD AUTO: 7 %
ERYTHROCYTE [DISTWIDTH] IN BLOOD BY AUTOMATED COUNT: 15.7 % (ref 12–15)
GFRSERPLBLD MDRD-ARVRAT: 82 ML/MIN/{1.73_M2} (ref 89–?)
GLOBULIN SER-MCNC: 2.9 G/DL (ref 2.1–4.2)
GLUCOSE SERPL-MCNC: 95 MG/DL (ref 70–100)
HGB UR QL STRIP: 12.7 G/DL (ref 14–18)
LIPASE SERPL-CCNC: 28 U/L (ref 22–51)
LYMPHOCYTES # SPEC AUTO: 0.7 10^3/UL (ref 1.5–3.5)
LYMPHOCYTES NFR BLD AUTO: 18.1 %
MCH RBC QN AUTO: 35.3 PG (ref 27–31)
MCHC RBC AUTO-ENTMCNC: 34 G/DL (ref 32–36)
MCV RBC AUTO: 103.8 FL (ref 80–94)
MONOCYTES # BLD AUTO: 0.4 10^3/UL (ref 0–1)
MONOCYTES NFR BLD AUTO: 9 %
NEUTROPHILS # BLD AUTO: 2.6 10^3/UL (ref 1.5–6.6)
NEUTROPHILS # SNV AUTO: 4 X10^3/UL (ref 4.8–10.8)
NEUTROPHILS NFR BLD AUTO: 63.9 %
PDW BLD AUTO: 8.4 FL (ref 7.4–11.4)
PLATELET # BLD: 178 10^3/UL (ref 130–450)
PROT SPEC-MCNC: 6.2 G/DL (ref 6.7–8.2)
RBC MAR: 3.6 10^6/UL (ref 4.7–6.1)
SODIUM SERPLBLD-SCNC: 140 MMOL/L (ref 135–145)

## 2019-03-07 NOTE — ED PHYSICIAN DOCUMENTATION
ED Addendum





- Addendum


Addendum: 





03/07/19 07:22


At the beginning of my shift, patient needed to frequently be reminded by 

nursing staff to stay in either the bed or the chair. He eventually fell asleep 

and slept quietly for the majority of my shift. Signed out to oncoming physician

pending placement and ongoing SW evaluation. He was in NAD during stay.

## 2019-03-07 NOTE — CT REPORT
Reason:  Recent head injury, increasing confusion and somno

Procedure Date:  03/07/2019   

Accession Number:  [id number] / [id number]                    

Procedure:  CT  - HEAD WO CPT Code:  

 

FULL RESULT:

 

 

EXAM:

CT HEAD

 

EXAM DATE: 3/7/2019 11:44 AM.

 

CLINICAL HISTORY: Recent head injury, increasing confusion and somno.

 

COMPARISON: CERVICAL SPINE W/O 03/06/2019 8:19 AM

HEAD W/O 03/06/2019 8:19 AM

FACIAL BONES W/O 03/06/2019 8:19 AM

HEAD W/O 10/08/2018 4:01 PM.

 

TECHNIQUE: Multiaxial CT images were obtained from the foramen magnum to 

the vertex. Reformats: Sagittal and coronal. IV contrast: None.

 

In accordance with CT protocol optimization, one or more of the following 

dose reduction techniques were utilized for this exam: automated exposure 

control, adjustment of mA and/or KV based on patient size, or use of 

iterative reconstructive technique.

 

FINDINGS:

Parenchyma: No intraparenchymal hemorrhage. No evidence of mass, midline 

shift, or CT findings of acute infarction. Gray-white differentiation is 

distinct. Diffuse chronic microangiopathic white matter changes are 

evident.

 

Extraaxial Spaces: Normal for age. No subdural or epidural collections 

identified. No pneumocephalus.

 

Ventricles: Stable diffuse ventriculomegaly with 6.7 cm bifrontal 

diameter. No intraventricular hemorrhage.

 

Sinuses and orbits: Layering hyperdense fluid within left maxillary 

antrum as on CT prior day, consistent with recent injury.

 

Bones: No depressed calvarial fracture.

(Left facial fractures are described on recent maxillofacial CT report 

03/06/2019.)

 

Other: None.

IMPRESSION:

1. Generalized age-related changes.

2. Stable chronic diffuse ventriculomegaly. Question normal pressure 

hydrocephalus.

3. No acute intracranial hemorrhage.

 

(Left facial fractures are described on recent maxillofacial CT report 

03/06/2019.)

 

RADIA

## 2019-03-08 LAB
ALBUMIN DIAFP-MCNC: 3.6 G/DL (ref 3.2–5.5)
ALBUMIN/GLOB SERPL: 1.2 {RATIO} (ref 1–2.2)
ALP SERPL-CCNC: 104 IU/L (ref 42–121)
ALT SERPL W P-5'-P-CCNC: 12 IU/L (ref 10–60)
ANION GAP SERPL CALCULATED.4IONS-SCNC: 10 MMOL/L (ref 6–13)
AST SERPL W P-5'-P-CCNC: 22 IU/L (ref 10–42)
BASOPHILS NFR BLD AUTO: 0 10^3/UL (ref 0–0.1)
BASOPHILS NFR BLD AUTO: 1 %
BILIRUB BLD-MCNC: 0.8 MG/DL (ref 0.2–1)
BUN SERPL-MCNC: 15 MG/DL (ref 6–20)
CALCIUM UR-MCNC: 8.4 MG/DL (ref 8.5–10.3)
CHLORIDE SERPL-SCNC: 96 MMOL/L (ref 101–111)
CLARITY UR REFRACT.AUTO: CLEAR
CO2 SERPL-SCNC: 26 MMOL/L (ref 21–32)
CREAT SERPLBLD-SCNC: 0.9 MG/DL (ref 0.6–1.2)
EOSINOPHIL # BLD AUTO: 0 10^3/UL (ref 0–0.7)
EOSINOPHIL NFR BLD AUTO: 0.7 %
ERYTHROCYTE [DISTWIDTH] IN BLOOD BY AUTOMATED COUNT: 15.4 % (ref 12–15)
GFRSERPLBLD MDRD-ARVRAT: 82 ML/MIN/{1.73_M2} (ref 89–?)
GLOBULIN SER-MCNC: 3.1 G/DL (ref 2.1–4.2)
GLUCOSE SERPL-MCNC: 103 MG/DL (ref 70–100)
GLUCOSE UR QL STRIP.AUTO: NEGATIVE MG/DL
HGB UR QL STRIP: 12.6 G/DL (ref 14–18)
KETONES UR QL STRIP.AUTO: 15 MG/DL
LIPASE SERPL-CCNC: 26 U/L (ref 22–51)
LYMPHOCYTES # SPEC AUTO: 0.3 10^3/UL (ref 1.5–3.5)
LYMPHOCYTES NFR BLD AUTO: 9.3 %
MCH RBC QN AUTO: 34.9 PG (ref 27–31)
MCHC RBC AUTO-ENTMCNC: 33.5 G/DL (ref 32–36)
MCV RBC AUTO: 104.3 FL (ref 80–94)
MONOCYTES # BLD AUTO: 0.4 10^3/UL (ref 0–1)
MONOCYTES NFR BLD AUTO: 13.6 %
MUCOUS THREADS URNS QL MICRO: (no result)
NEUTROPHILS # BLD AUTO: 2.2 10^3/UL (ref 1.5–6.6)
NEUTROPHILS # SNV AUTO: 2.9 X10^3/UL (ref 4.8–10.8)
NEUTROPHILS NFR BLD AUTO: 75.4 %
NITRITE UR QL STRIP.AUTO: NEGATIVE
PDW BLD AUTO: 8.6 FL (ref 7.4–11.4)
PH UR STRIP.AUTO: 6 PH (ref 5–7.5)
PLATELET # BLD: 148 10^3/UL (ref 130–450)
PROT SPEC-MCNC: 6.7 G/DL (ref 6.7–8.2)
PROT UR STRIP.AUTO-MCNC: NEGATIVE MG/DL
RBC # UR STRIP.AUTO: (no result) /UL
RBC # URNS HPF: (no result) /HPF (ref 0–5)
RBC MAR: 3.61 10^6/UL (ref 4.7–6.1)
SODIUM SERPLBLD-SCNC: 132 MMOL/L (ref 135–145)
SP GR UR STRIP.AUTO: 1.02 (ref 1–1.03)
SQUAMOUS URNS QL MICRO: (no result)
UROBILINOGEN UR QL STRIP.AUTO: (no result) E.U./DL
UROBILINOGEN UR STRIP.AUTO-MCNC: NEGATIVE MG/DL

## 2019-03-08 NOTE — ED PHYSICIAN DOCUMENTATION
ED Addendum





- Addendum


Addendum: 





03/07/19 18:00 PM This addendum is from 3/7/2019, during my shift the patient 

was slightly more confused than he had been the day before so a CT scan and lab 

work was performed.  There is no significant acute changes to explain it.  The 

patient was cooperative and directable.  The patient's care was turned over to 

the oncoming physician

## 2019-03-08 NOTE — ED PHYSICIAN DOCUMENTATION
ED Addendum





- Addendum


Addendum: 





03/08/19 08:41


Patient slept for much of my overnight shift. When awake, he frequently gets up 

and tries to ambulate into hallway; this required frequent instruction from 

nursing staff and ED tech to redirect him to either lie in bed or sit in chair 

at bedside and watch TV. He is in NAD and he is able to ambulate to and from 

bathroom with minimal assistance (not allowed to ambulate without assistance to 

prevent fall). 


03/08/19 08:44


He continues to await placement/disposition by social work, who will again 

evaluate patient in the morning. Case turned over to oncoming (day) ED 

physician, Dr. Hook.


Patient was in NAD during this overnight shift

## 2019-03-09 LAB
ALBUMIN DIAFP-MCNC: 3.4 G/DL (ref 3.2–5.5)
ALBUMIN/GLOB SERPL: 1.1 {RATIO} (ref 1–2.2)
ALP SERPL-CCNC: 98 IU/L (ref 42–121)
ALT SERPL W P-5'-P-CCNC: 14 IU/L (ref 10–60)
ANION GAP SERPL CALCULATED.4IONS-SCNC: 12 MMOL/L (ref 6–13)
AST SERPL W P-5'-P-CCNC: 30 IU/L (ref 10–42)
BASOPHILS NFR BLD AUTO: 0 10^3/UL (ref 0–0.1)
BASOPHILS NFR BLD AUTO: 0.4 %
BILIRUB BLD-MCNC: 0.6 MG/DL (ref 0.2–1)
BUN SERPL-MCNC: 20 MG/DL (ref 6–20)
CALCIUM UR-MCNC: 8.1 MG/DL (ref 8.5–10.3)
CHLORIDE SERPL-SCNC: 96 MMOL/L (ref 101–111)
CO2 SERPL-SCNC: 25 MMOL/L (ref 21–32)
CREAT SERPLBLD-SCNC: 1.1 MG/DL (ref 0.6–1.2)
EOSINOPHIL # BLD AUTO: 0 10^3/UL (ref 0–0.7)
EOSINOPHIL NFR BLD AUTO: 0.2 %
ERYTHROCYTE [DISTWIDTH] IN BLOOD BY AUTOMATED COUNT: 15.9 % (ref 12–15)
GFRSERPLBLD MDRD-ARVRAT: 65 ML/MIN/{1.73_M2} (ref 89–?)
GLOBULIN SER-MCNC: 3.2 G/DL (ref 2.1–4.2)
GLUCOSE SERPL-MCNC: 90 MG/DL (ref 70–100)
HGB UR QL STRIP: 12.9 G/DL (ref 14–18)
LIPASE SERPL-CCNC: 30 U/L (ref 22–51)
LYMPHOCYTES # SPEC AUTO: 0.6 10^3/UL (ref 1.5–3.5)
LYMPHOCYTES NFR BLD AUTO: 13.9 %
MCH RBC QN AUTO: 35.5 PG (ref 27–31)
MCHC RBC AUTO-ENTMCNC: 34.2 G/DL (ref 32–36)
MCV RBC AUTO: 103.9 FL (ref 80–94)
MONOCYTES # BLD AUTO: 0.7 10^3/UL (ref 0–1)
MONOCYTES NFR BLD AUTO: 16.3 %
NEUTROPHILS # BLD AUTO: 3 10^3/UL (ref 1.5–6.6)
NEUTROPHILS # SNV AUTO: 4.3 X10^3/UL (ref 4.8–10.8)
NEUTROPHILS NFR BLD AUTO: 69.2 %
PDW BLD AUTO: 8.6 FL (ref 7.4–11.4)
PLATELET # BLD: 136 10^3/UL (ref 130–450)
PROT SPEC-MCNC: 6.6 G/DL (ref 6.7–8.2)
RBC MAR: 3.64 10^6/UL (ref 4.7–6.1)
SODIUM SERPLBLD-SCNC: 133 MMOL/L (ref 135–145)

## 2019-03-09 NOTE — ED PHYSICIAN DOCUMENTATION
History of Present Illness





- Stated complaint


Stated Complaint: CONFUSED





- Chief complaint


Chief Complaint: Neuro





- History obtained from


History obtained from: Patient





- History of Present Illness


Timing: Today





- Additonal information


Additional information: 





73-year-old male who is boarding in the emergency department has had a decrease 

in his level of consciousness today he is less interactive and has not eaten 

since yesterday morning.  I was asked by the staff to come reevaluate the 

patient.  He is not forthcoming with much in the way of history he does answer 

questions he will open his eyes briefly seems disinterested.





PD PAST MEDICAL HISTORY





- Past Medical History


Past Medical History: No


Cardiovascular: None


Respiratory: None


Neuro: None


Endocrine/Autoimmune: None


GI: None


: None


HEENT: None


Psych: None


Musculoskeletal: None


Derm: Other





- Past Surgical History


Past Surgical History: No





- Present Medications


Home Medications: 


                                Ambulatory Orders











 Medication  Instructions  Recorded  Confirmed


 


No Known Home Medications  06/18/18 03/08/19














- Allergies


Allergies/Adverse Reactions: 


                                    Allergies











Allergy/AdvReac Type Severity Reaction Status Date / Time


 


Sulfa (Sulfonamide Allergy  Unknown Verified 06/18/18 16:06





Antibiotics)     


 


Penicillins AdvReac  Unknown Verified 06/18/18 16:06














- Social History


Does the pt smoke?: Yes


Smoking Status: Current every day smoker


Does the pt drink ETOH?: Yes


Does the pt have substance abuse?: No





- Immunizations


Immunizations are current?: No


Immunizations: TDAP >10years/unknown





- POLST


Patient has POLST: No





PD ED PE NORMAL





- Vitals


Vital signs reviewed: Yes





- General


General: No acute distress, Well developed/nourished, Other (lays with eyes 

closed and answers briefly when awakened looses intrest and falls back asleep. )





- Respiratory


Respiratory: No respiratory distress





Results





- Vitals


Vitals: 


                               Vital Signs - 24 hr











  03/09/19 03/09/19 03/09/19





  10:12 13:12 14:00


 


Temperature 38.1 C H  37.8 C H


 


Heart Rate 81 84 


 


Respiratory 18 16 





Rate   


 


Blood Pressure 137/60 H 131/71 H 


 


O2 Saturation 97 96 














  03/09/19 03/10/19 03/10/19





  18:36 07:10 07:49


 


Temperature 39.6 C H  


 


Heart Rate 87 84 


 


Respiratory 16 18 





Rate   


 


Blood Pressure 144/63 H  


 


O2 Saturation 94 90 L 89 L














  03/10/19 03/10/19





  08:12 08:44


 


Temperature  38.5 C H


 


Heart Rate  


 


Respiratory  





Rate  


 


Blood Pressure  


 


O2 Saturation 97 








                                     Oxygen











O2 Source                      Nasal cannula

















- Labs


Labs: 


                                Laboratory Tests











  03/04/19 03/04/19 03/04/19





  00:00 00:55 00:55


 


WBC   6.9 


 


RBC   3.65 L 


 


Hgb   12.7 L 


 


Hct   37.8 L 


 


MCV   103.6 H 


 


MCH   35.0 H 


 


MCHC   33.7 


 


RDW   15.8 H 


 


Plt Count   166 


 


MPV   8.5 


 


Neut # (Auto)   5.5 


 


Lymph # (Auto)   0.6 L 


 


Mono # (Auto)   0.4 


 


Eos # (Auto)   0.3 


 


Baso # (Auto)   0.1 


 


Absolute Nucleated RBC   0.00 


 


Total Counted   


 


Band Neuts % (Manual)   


 


Reactive Lymphs % (Man)   


 


Abnorm Lymph % (Manual)   


 


Nucleated RBC %   0.0 


 


Neutrophils # (Manual)   


 


Lymphocytes # (Manual)   


 


Monocytes # (Manual)   


 


Eosinophils # (Manual)   


 


Basophils # (Manual)   


 


Differential Comment   


 


Manual Slide Review   


 


Platelet Morphology   


 


Sodium    141


 


Potassium    4.1


 


Chloride    104


 


Carbon Dioxide    27


 


Anion Gap    10.0


 


BUN    16


 


Creatinine    0.9


 


Estimated GFR (MDRD)    82 L


 


Glucose    118 H


 


Calcium    8.9


 


Total Bilirubin    0.5


 


AST    23


 


ALT    18


 


Alkaline Phosphatase    121


 


Total Creatine Kinase   


 


Troponin I   


 


Total Protein    6.9


 


Albumin    3.6


 


Globulin    3.3


 


Albumin/Globulin Ratio    1.1


 


Lipase    28


 


Urine Color  YELLOW  


 


Urine Clarity  CLEAR  


 


Urine pH  5.5  


 


Ur Specific Gravity  >=1.030 H  


 


Urine Protein  TRACE  


 


Urine Glucose (UA)  NEGATIVE  


 


Urine Ketones  NEGATIVE  


 


Urine Occult Blood  TRACE-LYSE  


 


Urine Nitrite  NEGATIVE  


 


Urine Bilirubin  NEGATIVE  


 


Urine Urobilinogen  0.2 (NORMAL)  


 


Ur Leukocyte Esterase  NEGATIVE  


 


Urine RBC   


 


Urine WBC   


 


Ur Squamous Epith Cells   


 


Urine Bacteria   


 


Urine Mucus   


 


Ur Microscopic Review  NOT INDICATED  


 


Urine Culture Comments  NOT INDICATED  


 


Urine Opiates Screen  NEGATIVE  


 


Ur Oxycodone Screen  NEGATIVE  


 


Urine Methadone Screen  NEGATIVE  


 


Ur Propoxyphene Screen  NEGATIVE  


 


Ur Barbiturates Screen  NEGATIVE  


 


Ur Tricyclics Screen  NEGATIVE  


 


Ur Phencyclidine Scrn  NEGATIVE  


 


Ur Amphetamine Screen  NEGATIVE  


 


U Methamphetamines Scrn  NEGATIVE  


 


U Benzodiazepines Scrn  NEGATIVE  


 


Urine Cocaine Screen  NEGATIVE  


 


U Cannabinoids Screen  NEGATIVE  














  03/06/19 03/06/19 03/06/19





  08:37 08:37 08:37


 


WBC  4.3 L  


 


RBC  3.76 L  


 


Hgb  13.2 L  


 


Hct  39.0 L  


 


MCV  103.9 H  


 


MCH  35.2 H  


 


MCHC  33.9  


 


RDW  15.4 H  


 


Plt Count  183  


 


MPV  8.7  


 


Neut # (Auto)  Not Reportable  


 


Lymph # (Auto)  Not Reportable  


 


Mono # (Auto)  Not Reportable  


 


Eos # (Auto)  Not Reportable  


 


Baso # (Auto)  Not Reportable  


 


Absolute Nucleated RBC  Not Reportable  


 


Total Counted  100  


 


Band Neuts % (Manual)  0  


 


Reactive Lymphs % (Man)  6  


 


Abnorm Lymph % (Manual)  0  


 


Nucleated RBC %  Not Reportable  


 


Neutrophils # (Manual)  3.0  


 


Lymphocytes # (Manual)  0.9 L  


 


Monocytes # (Manual)  0.3  


 


Eosinophils # (Manual)  0.1  


 


Basophils # (Manual)  0.0  


 


Differential Comment  MANUAL DIFFERENTIAL  


 


Manual Slide Review  Indicated  


 


Platelet Morphology  RARE GIANT PLATELETS  


 


Sodium   138 


 


Potassium   3.9 


 


Chloride   101 


 


Carbon Dioxide   28 


 


Anion Gap   9.0 


 


BUN   15 


 


Creatinine   0.8 


 


Estimated GFR (MDRD)   94 


 


Glucose   108 H 


 


Calcium   8.7 


 


Total Bilirubin   0.7 


 


AST   20 


 


ALT   13 


 


Alkaline Phosphatase   113 


 


Total Creatine Kinase   122 


 


Troponin I    < 0.04


 


Total Protein   6.5 L 


 


Albumin   3.4 


 


Globulin   3.1 


 


Albumin/Globulin Ratio   1.1 


 


Lipase   26 


 


Urine Color   


 


Urine Clarity   


 


Urine pH   


 


Ur Specific Gravity   


 


Urine Protein   


 


Urine Glucose (UA)   


 


Urine Ketones   


 


Urine Occult Blood   


 


Urine Nitrite   


 


Urine Bilirubin   


 


Urine Urobilinogen   


 


Ur Leukocyte Esterase   


 


Urine RBC   


 


Urine WBC   


 


Ur Squamous Epith Cells   


 


Urine Bacteria   


 


Urine Mucus   


 


Ur Microscopic Review   


 


Urine Culture Comments   


 


Urine Opiates Screen   


 


Ur Oxycodone Screen   


 


Urine Methadone Screen   


 


Ur Propoxyphene Screen   


 


Ur Barbiturates Screen   


 


Ur Tricyclics Screen   


 


Ur Phencyclidine Scrn   


 


Ur Amphetamine Screen   


 


U Methamphetamines Scrn   


 


U Benzodiazepines Scrn   


 


Urine Cocaine Screen   


 


U Cannabinoids Screen   














  03/07/19 03/07/19 03/08/19





  10:50 10:50 18:29


 


WBC  4.0 L  


 


RBC  3.60 L  


 


Hgb  12.7 L  


 


Hct  37.3 L  


 


MCV  103.8 H  


 


MCH  35.3 H  


 


MCHC  34.0  


 


RDW  15.7 H  


 


Plt Count  178  


 


MPV  8.4  


 


Neut # (Auto)  2.6  


 


Lymph # (Auto)  0.7 L  


 


Mono # (Auto)  0.4  


 


Eos # (Auto)  0.3  


 


Baso # (Auto)  0.1  


 


Absolute Nucleated RBC  0.00  


 


Total Counted   


 


Band Neuts % (Manual)   


 


Reactive Lymphs % (Man)   


 


Abnorm Lymph % (Manual)   


 


Nucleated RBC %  0.0  


 


Neutrophils # (Manual)   


 


Lymphocytes # (Manual)   


 


Monocytes # (Manual)   


 


Eosinophils # (Manual)   


 


Basophils # (Manual)   


 


Differential Comment   


 


Manual Slide Review   


 


Platelet Morphology   


 


Sodium   140 


 


Potassium   4.1 


 


Chloride   104 


 


Carbon Dioxide   29 


 


Anion Gap   7.0 


 


BUN   15 


 


Creatinine   0.9 


 


Estimated GFR (MDRD)   82 L 


 


Glucose   95 


 


Calcium   8.6 


 


Total Bilirubin   0.6 


 


AST   18 


 


ALT   13 


 


Alkaline Phosphatase   109 


 


Total Creatine Kinase   


 


Troponin I   


 


Total Protein   6.2 L 


 


Albumin   3.3 


 


Globulin   2.9 


 


Albumin/Globulin Ratio   1.1 


 


Lipase   28 


 


Urine Color    YELLOW


 


Urine Clarity    CLEAR


 


Urine pH    6.0


 


Ur Specific Gravity    1.025


 


Urine Protein    NEGATIVE


 


Urine Glucose (UA)    NEGATIVE


 


Urine Ketones    15 H


 


Urine Occult Blood    SMALL H


 


Urine Nitrite    NEGATIVE


 


Urine Bilirubin    NEGATIVE


 


Urine Urobilinogen    0.2 (NORMAL)


 


Ur Leukocyte Esterase    NEGATIVE


 


Urine RBC    6-10 H


 


Urine WBC    0-3


 


Ur Squamous Epith Cells    RARE Squamous


 


Urine Bacteria    Rare


 


Urine Mucus    Few Strands


 


Ur Microscopic Review    INDICATED


 


Urine Culture Comments    NOT INDICATED


 


Urine Opiates Screen   


 


Ur Oxycodone Screen   


 


Urine Methadone Screen   


 


Ur Propoxyphene Screen   


 


Ur Barbiturates Screen   


 


Ur Tricyclics Screen   


 


Ur Phencyclidine Scrn   


 


Ur Amphetamine Screen   


 


U Methamphetamines Scrn   


 


U Benzodiazepines Scrn   


 


Urine Cocaine Screen   


 


U Cannabinoids Screen   














  03/08/19 03/08/19 03/09/19





  18:36 18:37 13:37


 


WBC   2.9 L  4.3 L


 


RBC   3.61 L  3.64 L


 


Hgb   12.6 L  12.9 L


 


Hct   37.6 L  37.8 L


 


MCV   104.3 H  103.9 H


 


MCH   34.9 H  35.5 H


 


MCHC   33.5  34.2


 


RDW   15.4 H  15.9 H


 


Plt Count   148  136


 


MPV   8.6  8.6


 


Neut # (Auto)   2.2  3.0


 


Lymph # (Auto)   0.3 L  0.6 L


 


Mono # (Auto)   0.4  0.7


 


Eos # (Auto)   0.0  0.0


 


Baso # (Auto)   0.0  0.0


 


Absolute Nucleated RBC   0.00  0.00


 


Total Counted   


 


Band Neuts % (Manual)   


 


Reactive Lymphs % (Man)   


 


Abnorm Lymph % (Manual)   


 


Nucleated RBC %   0.0  0.1


 


Neutrophils # (Manual)   


 


Lymphocytes # (Manual)   


 


Monocytes # (Manual)   


 


Eosinophils # (Manual)   


 


Basophils # (Manual)   


 


Differential Comment   


 


Manual Slide Review   


 


Platelet Morphology   


 


Sodium  132 L  


 


Potassium  4.3  


 


Chloride  96 L  


 


Carbon Dioxide  26  


 


Anion Gap  10.0  


 


BUN  15  


 


Creatinine  0.9  


 


Estimated GFR (MDRD)  82 L  


 


Glucose  103 H  


 


Calcium  8.4 L  


 


Total Bilirubin  0.8  


 


AST  22  


 


ALT  12  


 


Alkaline Phosphatase  104  


 


Total Creatine Kinase   


 


Troponin I   


 


Total Protein  6.7  


 


Albumin  3.6  


 


Globulin  3.1  


 


Albumin/Globulin Ratio  1.2  


 


Lipase  26  


 


Urine Color   


 


Urine Clarity   


 


Urine pH   


 


Ur Specific Gravity   


 


Urine Protein   


 


Urine Glucose (UA)   


 


Urine Ketones   


 


Urine Occult Blood   


 


Urine Nitrite   


 


Urine Bilirubin   


 


Urine Urobilinogen   


 


Ur Leukocyte Esterase   


 


Urine RBC   


 


Urine WBC   


 


Ur Squamous Epith Cells   


 


Urine Bacteria   


 


Urine Mucus   


 


Ur Microscopic Review   


 


Urine Culture Comments   


 


Urine Opiates Screen   


 


Ur Oxycodone Screen   


 


Urine Methadone Screen   


 


Ur Propoxyphene Screen   


 


Ur Barbiturates Screen   


 


Ur Tricyclics Screen   


 


Ur Phencyclidine Scrn   


 


Ur Amphetamine Screen   


 


U Methamphetamines Scrn   


 


U Benzodiazepines Scrn   


 


Urine Cocaine Screen   


 


U Cannabinoids Screen   














  03/09/19 03/09/19





  13:37 13:37


 


WBC  


 


RBC  


 


Hgb  


 


Hct  


 


MCV  


 


MCH  


 


MCHC  


 


RDW  


 


Plt Count  


 


MPV  


 


Neut # (Auto)  


 


Lymph # (Auto)  


 


Mono # (Auto)  


 


Eos # (Auto)  


 


Baso # (Auto)  


 


Absolute Nucleated RBC  


 


Total Counted  


 


Band Neuts % (Manual)  


 


Reactive Lymphs % (Man)  


 


Abnorm Lymph % (Manual)  


 


Nucleated RBC %  


 


Neutrophils # (Manual)  


 


Lymphocytes # (Manual)  


 


Monocytes # (Manual)  


 


Eosinophils # (Manual)  


 


Basophils # (Manual)  


 


Differential Comment  


 


Manual Slide Review  


 


Platelet Morphology  


 


Sodium  133 L 


 


Potassium  4.5 


 


Chloride  96 L 


 


Carbon Dioxide  25 


 


Anion Gap  12.0 


 


BUN  20 


 


Creatinine  1.1 


 


Estimated GFR (MDRD)  65 L 


 


Glucose  90 


 


Calcium  8.1 L 


 


Total Bilirubin  0.6 


 


AST  30 


 


ALT  14 


 


Alkaline Phosphatase  98 


 


Total Creatine Kinase  


 


Troponin I   < 0.04


 


Total Protein  6.6 L 


 


Albumin  3.4 


 


Globulin  3.2 


 


Albumin/Globulin Ratio  1.1 


 


Lipase  30 


 


Urine Color  


 


Urine Clarity  


 


Urine pH  


 


Ur Specific Gravity  


 


Urine Protein  


 


Urine Glucose (UA)  


 


Urine Ketones  


 


Urine Occult Blood  


 


Urine Nitrite  


 


Urine Bilirubin  


 


Urine Urobilinogen  


 


Ur Leukocyte Esterase  


 


Urine RBC  


 


Urine WBC  


 


Ur Squamous Epith Cells  


 


Urine Bacteria  


 


Urine Mucus  


 


Ur Microscopic Review  


 


Urine Culture Comments  


 


Urine Opiates Screen  


 


Ur Oxycodone Screen  


 


Urine Methadone Screen  


 


Ur Propoxyphene Screen  


 


Ur Barbiturates Screen  


 


Ur Tricyclics Screen  


 


Ur Phencyclidine Scrn  


 


Ur Amphetamine Screen  


 


U Methamphetamines Scrn  


 


U Benzodiazepines Scrn  


 


Urine Cocaine Screen  


 


U Cannabinoids Screen  














Procedures





- IVC sono (time)


  ** 1220


Bedside IVC sono: IVC measures (cm) (0.84), IVC collapsed c insp (cm) 

(complete), Dehydration (est 2 liter deficit)





PD MEDICAL DECISION MAKING





- ED course


Complexity details: reviewed old records, reviewed results, re-evaluated 

patient, considered differential


ED course: 





74 y/o male awaiting disposition has been less interractive today (3-9-19) and 

he is re-evaluated with repeat labs and on interrogation of the IVC he is 

dehydrated and he is given IV saline. He remains lethargic and is not eating 

well. A repeat CT of the head was done again without findings. The patient has 

remained in the ED another day pending disposition to assisted living. He is not

capable of assisted living in this condition. Last night an attempt to admit the

patient to the hospital failed for lack of findings. Today (3-10-19) he remains 

lethargic, he is febrile and hypoxic and a one view chest is obtained and he is 

found to have an infiltrate in the right lower lobe consistent with possible 

aspiration. 





Departure





- Departure


Disposition: [facility] DC/Xfer


Clinical Impression: 


 Confusion, NPH (normal pressure hydrocephalus)





Facial contusion


Qualifiers:


 Encounter type: initial encounter Qualified Code(s): S00.83XA - Contusion of 

other part of head, initial encounter





Closed head injury


Qualifiers:


 Encounter type: initial encounter Qualified Code(s): S09.90XA - Unspecified 

injury of head, initial encounter





Fall


Qualifiers:


 Encounter type: initial encounter Qualified Code(s): W19.XXXA - Unspecified 

fall, initial encounter





Facial fracture


Qualifiers:


 Encounter type: initial encounter Facial bone/location: other facial bone 

Fracture type: closed Laterality: unspecified laterality Qualified Code(s): 

S02.80XA - Fracture of other specified skull and facial bones, unspecified side,

initial encounter for closed fracture





Dementia


Qualifiers:


 Dementia type: unspecified type Dementia behavioral disturbance: without 

behavioral disturbance Qualified Code(s): F03.90 - Unspecified dementia without 

behavioral disturbance





Skin ulcer of face


Qualifiers:


 Non-pressure ulcer stage: unspecified non-pressure ulcer stage Qualified 

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 

unspecified severity





Facial abrasion


Qualifiers:


 Encounter type: initial encounter Qualified Code(s): S00.81XA - Abrasion of 

other part of head, initial encounter





Pneumonia


Qualifiers:


 Pneumonia type: due to unspecified organism Laterality: right Lung location: 

lower lobe of lung Qualified Code(s): J18.1 - Lobar pneumonia, unspecified 

organism





Comments: 


Please follow-up with plastic surgery at [facility] for further 

evaluation of your facial fractures.  Please call [phone]





Please follow-up with Dr. Matias Beltran at [facility] with the 

neurosurgery clinic for further evaluation of your normal pressure hy

drocephalus.  Please call area code [phone] to schedule appointment

## 2019-03-09 NOTE — ED PHYSICIAN DOCUMENTATION
ED Addendum





- Addendum


Addendum: 





03/09/19 07:00 AM The patient was stable throughout the evening, the patient was

directable and slept a good portion of the evening.  The patient's care will be 

turned over to the oncoming physician.  The patient is pending placement into a 

nursing home

## 2019-03-09 NOTE — CT REPORT
Reason:  decreased LOC

Procedure Date:  03/09/2019   

Accession Number:  [id number] / [id number]                    

Procedure:  CT  - HEAD WO CPT Code:  

 

FULL RESULT:

 

 

EXAM:

CT HEAD

 

EXAM DATE: 3/9/2019 07:39 PM.

 

CLINICAL HISTORY: Decreased level of consciousness.

 

COMPARISON: HEAD W/O 03/07/2019 11:35 AM.

 

TECHNIQUE: Multiaxial CT images were obtained from the foramen magnum to 

the vertex. Reformats: Sagittal and coronal. IV contrast: None.

 

In accordance with CT protocol optimization, one or more of the following 

dose reduction techniques were utilized for this exam: automated exposure 

control, adjustment of mA and/or KV based on patient size, or use of 

iterative reconstructive technique.

 

FINDINGS:

Parenchyma: No intraparenchymal hemorrhage. No evidence of mass, midline 

shift, or CT findings of acute infarction. Gray-white differentiation is 

distinct. Stable mild chronic microangiopathic white matter changes are 

evident.

 

Extraaxial Spaces: Normal for age. No subdural or epidural collections 

identified.

 

Ventricles: Stable ventriculomegaly without effacement of cortical sulci.

 

Sinuses and orbits: Fluid level in the left maxillary sinus and in 

inferior right mastoid air cells. Imaged paranasal sinuses, orbits, and 

mastoids otherwise show no significant abnormality.

 

Bones: No evidence of fracture or calvarial defect.

 

Other: None.

IMPRESSION: Stable age-related cortical atrophic changes, 

ventriculomegaly, and left maxillary sinusitis and right mastoiditis. No 

acute or progressive abnormalities.

 

RADIA

## 2019-03-10 RX ADMIN — MEMANTINE HYDROCHLORIDE SCH MG: 5 TABLET ORAL at 21:28

## 2019-03-10 RX ADMIN — FAMOTIDINE SCH MG: 20 TABLET, FILM COATED ORAL at 21:28

## 2019-03-10 RX ADMIN — SODIUM CHLORIDE, PRESERVATIVE FREE SCH ML: 5 INJECTION INTRAVENOUS at 17:21

## 2019-03-10 RX ADMIN — SODIUM CHLORIDE SCH MLS/HR: 9 INJECTION, SOLUTION INTRAVENOUS at 21:35

## 2019-03-10 NOTE — HISTORY & PHYSICAL EXAMINATION
Chief Complaint





- Chief Complaint


Chief Complaint: Shortness of breath with 88% O2 saturation on room air.





Respiratory Admission HPI





- Admitted From


Admitted from: ED





- History Obtained From


Records Reviewed: RN notes reviewed, Old records reviewed


Exam limitations: Clinical condition, Other (Patient has underlying Alzheimer's 

dementia)





- History of Present Illness


HPI Comment/Other: 


73-year-old male who With a past medical history of Alzheimer's dementia with 

behavioral disturbance who apparently lives at home with frequent falls and 

lives of TV dinners was brought in approximately a week ago however social 

worker had set up placement to [place] but patient had sustained a fall in 

the emergency department while Awaiting placement.  In addition patient had 

likely aspirated as patient began to have decreasing level of consciousness and 

from his baseline mental status of Alzheimer's dementia.  Patient was found to 

have a right lower lobe infiltrate on chest x-ray and desaturations were clock 

at 88% on room air.  Patient was deemed to have a HCAP with underlying 

aspiration. Copious green mucus pooling at the level of the oropharynx was 

noted. Upon initial exam patient had bilateral expiratory rhonchi and fine 

bibasilar rales along with a normal CBC and normal electrolytes with a 

creatinine of 1.1 lactic acid was normal with a C. difficile negative and a CT 

head showing no acute intracranial process.  Patient had CT of the facial bones 

and C-spine which did relieve yield no fractures at the levels to be C-spine but

did reveal left nasal bone fractures fractures of the superior lateral walls 

with left maxillary left facial and left angel-orbital involvement.  There was 

left supraorbital and frontal soft tissue swelling as well which is consistent 

with patient falling to the ground hitting his face with some contusions and 

lacerations. 





PMH/PSH





- Past Medical History


Cardiovascular: positive: None


Respiratory: positive: None


Neuro: positive: None


Endocrine/Autoimmune: positive: None


GI: positive: None


: positive: None


HEENT: positive: None


Psych: positive: None


Musculoskeletal: positive: None


Derm: positive: Other


MRSA Hx?: No





Social & Family Hx





- Living Situation


Living Arrangement: At home





- Social History


Does the pt smoke?: Yes


Smoking Status: Current every day smoker


Does the pt drink ETOH?: Yes


Does the pt have substance abuse?: No





- POLST


Patient has POLST: No





Meds/Allgy





- Home Medications


Home Medications: 


                                Ambulatory Orders











 Medication  Instructions  Recorded  Confirmed


 


No Known Home Medications  06/18/18 03/08/19














- Allergies


Allergies/Adverse Reactions: 


                                    Allergies











Allergy/AdvReac Type Severity Reaction Status Date / Time


 


Sulfa (Sulfonamide Allergy  Unknown Verified 06/18/18 16:06





Antibiotics)     


 


Penicillins AdvReac  Unknown Verified 06/18/18 16:06














Review of Systems





- All Other Systems


All Other Systems: reports: Reviewed and negative


Prior Level of Functionality: 


Unclear baseline at home ADLs as patient has had frequent falls and has 

Alzheimer's dementia





Exam





- Vital Signs


Reviewed Vital Signs: Yes


Vital Signs: 





                                Vital Signs x48h











  Temp Pulse Resp BP Pulse Ox


 


 03/10/19 11:46   71  20  143/67 H  93


 


 03/10/19 11:06  37.2 C    


 


 03/10/19 10:12   69  18  124/49 L  94


 


 03/10/19 08:44  38.5 C H    


 


 03/10/19 08:12      97


 


 03/10/19 07:49      89 L














- Physical Exam


General Appearance: positive: Alert, Other


Eyes Bilateral: positive: Normal inspection, PERRL, EOMI, Other (Multiple 

ecchymotic bruising and very orbital swelling)


ENT: positive: ENT inspection nml, Dry mucous membranes, Other (There is copious

pooling to the oropharynx with mucoid secretions).  negative: Pharyngeal 

erythema, Oral lesions


Neck: positive: Nml inspection, Thyroid nml.  negative: No JVD, Carotid bruit, S

welling/bruising


Respiratory: positive: Chest non-tender, No respiratory distress, Rales, 

Rhonchi.  negative: Wheezes (Fine rales to the bases)


Cardiovascular: positive: Regular rate & rhythm, No murmur, No gallop


Peripheral Pulses: positive: 2+


Abdomen: positive: Non-tender, No organomegaly, Nml bowel sounds, No distention


Skin: positive: Color nml, No rash, Warm


Extremities: positive: Non-tender, Full ROM, Nml appearance.  negative: Pedal 

edema, Calf tenderness, Joint swelling


Neurologic/Psychiatric: positive: Other (Patient has Alzheimer's dementia and 

difficult to assess neurologic psychiatric status)





Results





- Lab Results


Lab results reviewed: Yes


Fish Bones: 


                                 03/09/19 13:37





                                 03/09/19 13:37


Other Lab Results: 





                               Lab Results x24hrs











  03/10/19 03/10/19 Range/Units





  09:15 07:43 


 


Lactic Acid  1.0   (0.5-2.2)  mmol/L


 


C. difficile Tox B Gene   NEGATIVE  (NEGATIVE)  














- Diagnostic Imaging Results


Diagnostic Imaging Results: positive: Final report reviewed (Review of CT head, 

CT spine and CT facial bones along with chest x-ray)





- EKG Results


EKG Interpreted Independently: Yes


EKG Comparison: positive: Old EKG unavailable


EKG Findings: 


EKG shows sinus rhythm at 60 bpm with a left anterior fascicular block and an 

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








Sepsis Event Note (H)





- Evaluation


Current Stage of Sepsis: Ruled out





Impression/Plan





- Problem List


Problem List: 


1.  Healthcare associated pneumonia


2.  Suspected aspiration pneumonia with right middle lobe lower lobe infiltrate 

involvement


3.  Acute left sinusitis with associated facial fractures


4.  Acute fall with a history of fall with multiple facial fractures to the left

nasal bone, superior lateral walls of the left maxillary, left face periorbital 

along with left supraorbital frontal soft tissue swelling


5.  Macrocytosis


6.  Alzheimer's dementia with behavioral disturbance and history of falls


7. Advance care planning education and counseling


7.  Social placement





Plan: We will continue with medical management and improve respiratory function 

as it pertains of patient's hospital acquired pneumonia with a component of 

aspiration.  Would place on a mechanical altered dysphagia or pured diet for 

now and have speech pathology to assess for swallowing function and aspiration 

to determine food consistency.  Will place on Levaquin as patient has allergy to

penicillin and cephalosporin with great cross-reactivit, Would add on 

vancomycin, as well. Continue with oxygenation, duo nebs, incentive spirometry, 

pulmonary toileting.  We will place on orthostats as patient has had multiple 

falls and will evaluate for orthostatic hypotension. We will place on either 

Aricept and/or Namenda to improve cognitive function.  PT to follow.  Social 

worker on case as patient has been initiated for [place].  Patient is a 

smoker will place on a transdermal nicotine patch 14-21 mg transdermal daily.  

Will obtain a sputum culture and send out to see if this is growing out any 

other organisms. Due to patient's demented status advance care planning edu

cation counseling was difficult to ascertain.





Initiate DVT/GI prophylaxis





CODE STATUS: Patient has no SONIA ST and [employer] and will place an full code as 

for now.

## 2019-03-10 NOTE — XRAY REPORT
Reason:  hypoxia

Procedure Date:  03/10/2019   

Accession Number:  [id number] / [id number]                    

Procedure:  XR  - Chest 1 View X-Ray CPT Code:  07090

 

FULL RESULT:

 

 

EXAM:

CHEST RADIOGRAPHY

 

EXAM DATE: 3/10/2019 07:47 AM.

 

CLINICAL HISTORY: Hypoxia.

 

COMPARISON: Chest radiograph from 03/07/2011.

 

TECHNIQUE: 1 view.

 

FINDINGS:

Lungs/Pleura: There is somewhat nodular opacity in the right apex, which 

appears to have been present previously and is suggestive of 

pleural-parenchymal scarring.

 

There is new patchy opacity in the right base. No pleural effusion or 

pneumothorax.

 

Mediastinum: Cardiomediastinal silhouette is within normal limits. 

Pulmonary vasculature is unremarkable.

 

Other: None.

 

IMPRESSION:

1. New patchy right basilar opacity, concerning for aspiration or 

pneumonia. Radiographic follow-up is recommended in 6-8 weeks to ensure 

resolution.

2. Suspected pleural-parenchymal scarring at the right lung apex has not 

significantly changed.

 

RADIA

## 2019-03-11 LAB
ALBUMIN DIAFP-MCNC: 2.9 G/DL (ref 3.2–5.5)
ANION GAP SERPL CALCULATED.4IONS-SCNC: 9 MMOL/L (ref 6–13)
BASOPHILS NFR BLD AUTO: 0 10^3/UL (ref 0–0.1)
BASOPHILS NFR BLD AUTO: 0.2 %
BUN SERPL-MCNC: 35 MG/DL (ref 6–20)
CALCIUM UR-MCNC: 8.1 MG/DL (ref 8.5–10.3)
CHLORIDE SERPL-SCNC: 106 MMOL/L (ref 101–111)
CO2 SERPL-SCNC: 22 MMOL/L (ref 21–32)
CREAT SERPLBLD-SCNC: 1.2 MG/DL (ref 0.6–1.2)
EOSINOPHIL # BLD AUTO: 0 10^3/UL (ref 0–0.7)
EOSINOPHIL NFR BLD AUTO: 0 %
ERYTHROCYTE [DISTWIDTH] IN BLOOD BY AUTOMATED COUNT: 15.8 % (ref 12–15)
GFRSERPLBLD MDRD-ARVRAT: 59 ML/MIN/{1.73_M2} (ref 89–?)
GLUCOSE SERPL-MCNC: 95 MG/DL (ref 70–100)
HGB UR QL STRIP: 12.4 G/DL (ref 14–18)
LYMPHOCYTES # SPEC AUTO: 1 10^3/UL (ref 1.5–3.5)
LYMPHOCYTES NFR BLD AUTO: 13.9 %
MCH RBC QN AUTO: 34.9 PG (ref 27–31)
MCHC RBC AUTO-ENTMCNC: 33.6 G/DL (ref 32–36)
MCV RBC AUTO: 104.1 FL (ref 80–94)
MONOCYTES # BLD AUTO: 0.5 10^3/UL (ref 0–1)
MONOCYTES NFR BLD AUTO: 7.6 %
NEUTROPHILS # BLD AUTO: 5.6 10^3/UL (ref 1.5–6.6)
NEUTROPHILS # SNV AUTO: 7.1 X10^3/UL (ref 4.8–10.8)
NEUTROPHILS NFR BLD AUTO: 78.3 %
PDW BLD AUTO: 8.8 FL (ref 7.4–11.4)
PHOSPHATE BLD-MCNC: 2.4 MG/DL (ref 2.5–4.6)
PLATELET # BLD: 120 10^3/UL (ref 130–450)
RBC MAR: 3.56 10^6/UL (ref 4.7–6.1)
SODIUM SERPLBLD-SCNC: 137 MMOL/L (ref 135–145)

## 2019-03-11 RX ADMIN — MEMANTINE HYDROCHLORIDE SCH MG: 5 TABLET ORAL at 20:50

## 2019-03-11 RX ADMIN — FAMOTIDINE SCH MG: 20 TABLET, FILM COATED ORAL at 20:50

## 2019-03-11 RX ADMIN — ENOXAPARIN SODIUM SCH MG: 100 INJECTION SUBCUTANEOUS at 08:34

## 2019-03-11 RX ADMIN — SODIUM CHLORIDE SCH MLS/HR: 9 INJECTION, SOLUTION INTRAVENOUS at 10:30

## 2019-03-11 RX ADMIN — MEMANTINE HYDROCHLORIDE SCH MG: 5 TABLET ORAL at 08:34

## 2019-03-11 RX ADMIN — SODIUM CHLORIDE, POTASSIUM CHLORIDE, SODIUM LACTATE AND CALCIUM CHLORIDE SCH MLS/HR: 600; 310; 30; 20 INJECTION, SOLUTION INTRAVENOUS at 20:27

## 2019-03-11 RX ADMIN — SODIUM CHLORIDE, PRESERVATIVE FREE SCH: 5 INJECTION INTRAVENOUS at 02:05

## 2019-03-11 RX ADMIN — Medication SCH TAB: at 17:39

## 2019-03-11 RX ADMIN — FAMOTIDINE SCH MG: 20 TABLET, FILM COATED ORAL at 08:34

## 2019-03-11 RX ADMIN — SODIUM CHLORIDE, PRESERVATIVE FREE SCH ML: 5 INJECTION INTRAVENOUS at 17:40

## 2019-03-11 RX ADMIN — POLYETHYLENE GLYCOL 3350 SCH GM: 17 POWDER, FOR SOLUTION ORAL at 08:33

## 2019-03-11 RX ADMIN — SODIUM CHLORIDE, POTASSIUM CHLORIDE, SODIUM LACTATE AND CALCIUM CHLORIDE SCH MLS/HR: 600; 310; 30; 20 INJECTION, SOLUTION INTRAVENOUS at 07:38

## 2019-03-11 RX ADMIN — SODIUM CHLORIDE, PRESERVATIVE FREE SCH: 5 INJECTION INTRAVENOUS at 08:34

## 2019-03-11 RX ADMIN — SODIUM CHLORIDE SCH MLS/HR: 9 INJECTION, SOLUTION INTRAVENOUS at 22:19

## 2019-03-11 RX ADMIN — NICOTINE SCH PATCH: 14 PATCH TRANSDERMAL at 08:33

## 2019-03-11 NOTE — MISCELLANEOUS PROVIDER NOTE
Miscellaneous Provider Note





- -


Note: 


HPI Comment/Other: 


73-year-old male who With a past medical history of Alzheimer's dementia with 

behavioral disturbance who apparently lives at home with frequent falls and 

lives of TV dinners was brought in approximately a week ago however social 

worker had set up placement to [place] but patient had sustained a fall in 

the emergency department while Awaiting placement.  In addition patient had 

likely aspirated as patient began to have decreasing level of consciousness and 

from his baseline mental status of Alzheimer's dementia.  Patient was found to 

have a right lower lobe infiltrate on chest x-ray and desaturations were clock 

at 88% on room air.  Patient was deemed to have a HCAP with underlying 

aspiration. Copious green mucus pooling at the level of the oropharynx was 

noted. Upon initial exam patient had bilateral expiratory rhonchi and fine 

bibasilar rales along with a normal CBC and normal electrolytes with a 

creatinine of 1.1 lactic acid was normal with a C. difficile negative and a CT 

head showing no acute intracranial process.  Patient had CT of the facial bones 

and C-spine which did relieve yield no fractures at the levels to be C-spine but

did reveal left nasal bone fractures fractures of the superior lateral walls 

with left maxillary left facial and left angel-orbital involvement.  There was 

left supraorbital and frontal soft tissue swelling as well which is consistent 

with patient falling to the ground hitting his face with some contusions and 

lacerations. 





Subjective: Patient is a poor historian with Alzheimer's dementia and disease 

and does not convey much history on questioning.





Objective: Vital signs hemodynamic stable afebrile heart rate of 64 blood 

pressure 133/63 with a respiratory rate of 1696% O2 saturation on 2 L nasal 

cannula


General: Patient with Alzheimer's dementia in no acute respiratory distress 

actively coughing, ill-appearing


HEENT: Pupils are equal round reactive light and accommodation extraocular 

muscles are bilateral intact, multiple facial ecchymosis with laceration to the 

bridge of nose and depression along with periorbital swelling to the right.  

Edentulous and poor dentition.  Pooling of mucus in the oropharynx.


Neck: No JVD or bruits no lymphadenopathy 


CV/lungs: RRR.  S1-S2 within normal limits.  No gallops clicks or rubs.  

Decreased breath sounds to bases with mild expiratory rhonchi and fine scattered

rales.  No increased work of breath no retractions next


Abdomen: Soft nontender nondistended positive bowel sounds no guarding no HSM 

except extremities and skin no edema clubbing or cyanosis 2+ pulses dorsalis 

pedis


Neuro: Difficult to assess due to patient's Alzheimer's dementia and cognitive 

deficits with poor cooperation.  No psychomotor retardation expressed 





Labs: Reviewed


Imaging studies: Reviewed





Assessment/plan: 


- Problem List


Problem List: 


1.  Healthcare associated pneumonia


2.  Suspected aspiration pneumonia with right middle lobe lower lobe infiltrate 

involvement


3.  Acute left sinusitis with associated facial fractures


4.  Acute fall with a history of fall with multiple facial fractures to the left

nasal bone, superior lateral walls of the left maxillary, left face periorbital 

along with left supraorbital frontal soft tissue swelling


5.  Macrocytosis


6.  Alzheimer's dementia with behavioral disturbance and history of falls


7. Acute renal insufficiency sec to dehydration 


8. Advance care planning education and counseling


9.  Social placement





Plan: Continue with nebulizers, IV antibiotics with vancomycin and Levaquin for 

H CAP, speech therapy to recommend food consistency as patient is pulling much 

secretions and mucus to oropharynx, pulmonary toileting and incentive spirometry

with oxygenation, obtain sputum sample to be sent for evaluation, MRSA screen.  

Patient has mild acute renal insufficiency with a creatinine 1.2 previously was 

0.9 will change LR to an increase to 100 mL/HR LFTs within normal limits as well

as electrolytes.  Currently awaiting placement at [place] however patient has

been accepted as of now.  Patient does not appear to have any intracranial 

hemorrhaging as per CT of the head which patient currently has baseline 

Alzheimer's dementia with some cognitive deficits and mobility gait issues.  

Will await physical therapy assessment for recommendations.  Facial fractures 

will be addressed as an outpatient as they are stable with no compromise of 

sinus cavities or free air. We will continue to monitor and place on orthostats 

with Midrin as needed, Namenda to be dispensed, transdermal nicotine patch for 

patient's smoking history. Due to patient's demented status advance care 

planning education counseling was difficult to ascertain.





Initiate DVT/GI prophylaxis





CODE STATUS: Patient has no SONIA ST and [employer] and will place an full code as 

for now.

## 2019-03-12 LAB
ALBUMIN DIAFP-MCNC: 2.7 G/DL (ref 3.2–5.5)
ANION GAP SERPL CALCULATED.4IONS-SCNC: 8 MMOL/L (ref 6–13)
BASOPHILS NFR BLD AUTO: 0 10^3/UL (ref 0–0.1)
BASOPHILS NFR BLD AUTO: 0.2 %
BUN SERPL-MCNC: 21 MG/DL (ref 6–20)
CALCIUM UR-MCNC: 8.1 MG/DL (ref 8.5–10.3)
CHLORIDE SERPL-SCNC: 106 MMOL/L (ref 101–111)
CO2 SERPL-SCNC: 23 MMOL/L (ref 21–32)
CREAT SERPLBLD-SCNC: 1.1 MG/DL (ref 0.6–1.2)
EOSINOPHIL # BLD AUTO: 0 10^3/UL (ref 0–0.7)
EOSINOPHIL NFR BLD AUTO: 0.1 %
ERYTHROCYTE [DISTWIDTH] IN BLOOD BY AUTOMATED COUNT: 15.7 % (ref 12–15)
GFRSERPLBLD MDRD-ARVRAT: 65 ML/MIN/{1.73_M2} (ref 89–?)
GLUCOSE SERPL-MCNC: 106 MG/DL (ref 70–100)
HGB UR QL STRIP: 12.2 G/DL (ref 14–18)
LYMPHOCYTES # SPEC AUTO: 0.4 10^3/UL (ref 1.5–3.5)
LYMPHOCYTES NFR BLD AUTO: 9.8 %
MCH RBC QN AUTO: 34.8 PG (ref 27–31)
MCHC RBC AUTO-ENTMCNC: 34.2 G/DL (ref 32–36)
MCV RBC AUTO: 101.7 FL (ref 80–94)
MONOCYTES # BLD AUTO: 0.2 10^3/UL (ref 0–1)
MONOCYTES NFR BLD AUTO: 6.3 %
NEUTROPHILS # BLD AUTO: 3.2 10^3/UL (ref 1.5–6.6)
NEUTROPHILS # SNV AUTO: 3.8 X10^3/UL (ref 4.8–10.8)
NEUTROPHILS NFR BLD AUTO: 83.6 %
PDW BLD AUTO: 8.9 FL (ref 7.4–11.4)
PHOSPHATE BLD-MCNC: 1.7 MG/DL (ref 2.5–4.6)
PLATELET # BLD: 118 10^3/UL (ref 130–450)
RBC MAR: 3.5 10^6/UL (ref 4.7–6.1)
SODIUM SERPLBLD-SCNC: 137 MMOL/L (ref 135–145)
VANCOMYCIN TROUGH SERPL-MCNC: 17.4 UG/ML (ref 10–20)

## 2019-03-12 RX ADMIN — CLINDAMYCIN PHOSPHATE SCH MLS/HR: 600 INJECTION, SOLUTION INTRAVENOUS at 13:36

## 2019-03-12 RX ADMIN — CLINDAMYCIN PHOSPHATE SCH MLS/HR: 600 INJECTION, SOLUTION INTRAVENOUS at 19:50

## 2019-03-12 RX ADMIN — FAMOTIDINE SCH MG: 20 TABLET, FILM COATED ORAL at 21:29

## 2019-03-12 RX ADMIN — SODIUM CHLORIDE, POTASSIUM CHLORIDE, SODIUM LACTATE AND CALCIUM CHLORIDE SCH MLS/HR: 600; 310; 30; 20 INJECTION, SOLUTION INTRAVENOUS at 08:23

## 2019-03-12 RX ADMIN — NICOTINE SCH PATCH: 14 PATCH TRANSDERMAL at 08:22

## 2019-03-12 RX ADMIN — ENOXAPARIN SODIUM SCH MG: 100 INJECTION SUBCUTANEOUS at 08:22

## 2019-03-12 RX ADMIN — FAMOTIDINE SCH MG: 20 TABLET, FILM COATED ORAL at 08:22

## 2019-03-12 RX ADMIN — Medication SCH TAB: at 08:22

## 2019-03-12 RX ADMIN — MEMANTINE HYDROCHLORIDE SCH MG: 5 TABLET ORAL at 21:30

## 2019-03-12 RX ADMIN — MEMANTINE HYDROCHLORIDE SCH MG: 5 TABLET ORAL at 08:22

## 2019-03-12 RX ADMIN — SODIUM CHLORIDE, PRESERVATIVE FREE SCH ML: 5 INJECTION INTRAVENOUS at 17:50

## 2019-03-12 RX ADMIN — SODIUM CHLORIDE SCH MLS/HR: 9 INJECTION, SOLUTION INTRAVENOUS at 11:33

## 2019-03-12 RX ADMIN — POLYETHYLENE GLYCOL 3350 SCH: 17 POWDER, FOR SOLUTION ORAL at 08:26

## 2019-03-12 RX ADMIN — SODIUM CHLORIDE SCH MLS/HR: 9 INJECTION, SOLUTION INTRAVENOUS at 22:41

## 2019-03-12 RX ADMIN — SODIUM CHLORIDE, PRESERVATIVE FREE SCH: 5 INJECTION INTRAVENOUS at 08:26

## 2019-03-12 NOTE — PROVIDER PROGRESS NOTE
Assessment/Plan





- Problem List


(1) HCAP (healthcare-associated pneumonia)


Assessment/Plan: 


This developed during his days in ER, when he was awaiting SNF or NH placement. 


Continue Mucinex.


He may need more pulmonary toilet with Acapella, if he will follow commands.








(2) Aspiration pneumonia


Assessment/Plan: 


Since the concern is aspiration, I will broaden iv antibiotics and add coverage 

for anaerobes, with Clindamycin added to Vanco and Levaquin.


Continue Mucinex.


He may need more pulmonary toilet with Acapella, if he will follow commands.








(3) Facial fracture


Qualifiers: 


   Encounter type: subsequent encounter   Facial bone/location: other facial 

bone   Fracture type: closed   Laterality: left 


Assessment/Plan: 





He is not in pain and cannot remember the event, which apparently occurred in 

the ER.








(4) Fall


Qualifiers: 


   Encounter type: subsequent encounter   Qualified Code(s): W19.XXXD - 

Unspecified fall, subsequent encounter   


Assessment/Plan: 


The fall in the ER resulted in the trauma to his L face.








(5) Frequent falls


Assessment/Plan: 


He cannot be [facility] home, he will be gong to [place] when medically cleared.


PT has started here.








(6) Alzheimer's dementia with behavioral disturbance


Assessment/Plan: 


Pt on Aricept.








(7) IAN (acute kidney injury)


Assessment/Plan: 


Improving with iv hydration.








(8) Hypokalemia


Assessment/Plan: 


Will replace.


Follow BMP daily.








- Current Meds


Current Meds: 





                               Current Medications











Generic Name Dose Route Start Last Admin





  Trade Name Freq  PRN Reason Stop Dose Admin


 


Enoxaparin Sodium  40 mg  03/11/19 09:00  03/12/19 08:22





  Lovenox  SUBQ   40 mg





  DAILY JAISON   Administration





     





     





     





     


 


Famotidine  20 mg  03/10/19 21:00  03/12/19 08:22





  Pepcid  PO   20 mg





  BID JAISON   Administration





     





     





     





     


 


Vancomycin HCl 1 gm/ Sodium  250 mls @ 166.667 mls/hr  03/10/19 22:00  03/12/19 

11:33





  Chloride  IV   166.67 mls/hr





  Q12H JAISON   Administration





     





     





     





     


 


Levofloxacin  750 mg in 150 mls @ 100 mls/hr  03/10/19 17:00  03/11/19 19:20





  Levaquin 750 Mg/150 Ml  IV   Infused





  Q24H JAISON   Infusion





     





     





     





     


 


Lactated Ringer's  1,000 mls @ 75 mls/hr  03/11/19 06:11  03/12/19 08:23





  Lr  IV   75 mls/hr





  [id number] JAISON   Administration





     





     





     





     


 


Memantine  5 mg  03/10/19 21:00  03/12/19 08:22





  Namenda  PO   5 mg





  BID JAISON   Administration





     





     





     





     


 


Multivitamins/Minerals  1 tab  03/11/19 16:00  03/12/19 08:22





  Theragran  M  PO   1 tab





  DAILYWM JAISON   Administration





     





     





     





     


 


Nicotine  1 patch  03/11/19 09:00  03/12/19 08:22





  Nicoderm  TOP   1 patch





  DAILY JAISON   Administration





     





     





     





     


 


Polyethylene Glycol  17 gm  03/11/19 09:00  03/12/19 08:26





  Miralax  PO   Not Given





  DAILY JAISON   





     





     





     





     


 


Sodium Chloride  10 ml  03/10/19 17:00  03/12/19 08:26





  Normal Saline Flush 0.9%  IVP   Not Given





  0100,0900,1700 JAISON   





     





     





     





     


 


Trazodone HCl  50 mg  03/11/19 21:44  03/11/19 21:53





  Desyrel  PO   50 mg





  QPM PRN   Administration





  Insomnia   





     





     





     














- Lab Result


Fish Bone Diagrams: 


                                 03/12/19 06:07





                                 03/12/19 06:07





- Additional Planning


My Orders: 





My Active Orders





03/12/19 12:00


Clindamycin 600 mg/50 ml [Cleocin 600 mg/50 ml] 50 ml IV Q6HR 














Subjective





- Subjective


Patient Reports: Other (Answers in 1-2 word sentences and cannot tell me any 

details.)


Nursing Reports: Other (Oriented to self only.)





Objective


Vital Signs: 





                               Vital Signs - 24 hr











  03/11/19 03/11/19 03/12/19





  16:50 21:39 00:00


 


Temperature 37.1 C 37.3 C 36.9 C


 


Heart Rate   


 


Heart Rate [ 64 69 69





Brachial]   


 


Respiratory 20 20 22





Rate   


 


Blood Pressure 129/56 L 139/71 H 139/58 H





[Right Brachial   





artery]   


 


O2 Saturation 95 94 94














  03/12/19 03/12/19





  01:18 07:50


 


Temperature 37.3 C 37.2 C


 


Heart Rate 69 


 


Heart Rate [  88





Brachial]  


 


Respiratory 20 20





Rate  


 


Blood Pressure  129/75





[Right Brachial  





artery]  


 


O2 Saturation 94 96








                                     Oxygen











O2 Source                      Room air














I&O (Last 24 Hrs): 





                          Intake and Output Totals x24h











 03/10/19 03/11/19 03/12/19





 23:59 23:59 23:59


 


Intake Total  2803.75 1240


 


Balance  2803.75 1240











General: No acute distress


HEENT: EOMI, Mucous membr. moist/pink, Other (L eye: hematoma below eye and 

dried blood over eye)


Neck: Supple


Neuro: Other (Oriented to self only.)


Cardiovascular: Regular rate, No murmurs


Respiratory: No respiratory distress, Other (Diffusely poor air movement, 

distant BS, no wheezing, trales or rhonchi.)


Abdomen: Soft


Extremities: No edema





- Results


Results: 





                               Laboratory Results











WBC  3.8 x10^3/uL (4.8-10.8)  L  03/12/19  06:07    


 


RBC  3.50 10^6/uL (4.70-6.10)  L  03/12/19  06:07    


 


Hgb  12.2 g/dL (14.0-18.0)  L  03/12/19  06:07    


 


Hct  35.6 % (42.0-52.0)  L  03/12/19  06:07    


 


MCV  101.7 fL (80.0-94.0)  H  03/12/19  06:07    


 


MCH  34.8 pg (27.0-31.0)  H  03/12/19  06:07    


 


MCHC  34.2 g/dL (32.0-36.0)   03/12/19  06:07    


 


RDW  15.7 % (12.0-15.0)  H  03/12/19  06:07    


 


Plt Count  118 10^3/uL (130-450)  L  03/12/19  06:07    


 


MPV  8.9 fL (7.4-11.4)   03/12/19  06:07    


 


Neut # (Auto)  3.2 10^3/uL (1.5-6.6)   03/12/19  06:07    


 


Lymph # (Auto)  0.4 10^3/uL (1.5-3.5)  L  03/12/19  06:07    


 


Mono # (Auto)  0.2 10^3/uL (0.0-1.0)   03/12/19  06:07    


 


Eos # (Auto)  0.0 10^3/uL (0.0-0.7)   03/12/19  06:07    


 


Baso # (Auto)  0.0 10^3/uL (0.0-0.1)   03/12/19  06:07    


 


Absolute Nucleated RBC  0.00 x10^3/uL  03/12/19  06:07    


 


Total Counted  100   03/06/19  08:37    


 


Band Neuts % (Manual)  0 % (0-10)  03/06/19  08:37    


 


Reactive Lymphs % (Man)  6 %  03/06/19  08:37    


 


Abnorm Lymph % (Manual)  0 %  03/06/19  08:37    


 


Nucleated RBC %  0.0 /100WBC  03/12/19  06:07    


 


Neutrophils # (Manual)  3.0 10^3/uL (1.5-6.6)   03/06/19  08:37    


 


Lymphocytes # (Manual)  0.9 10^3/uL (1.5-3.5)  L  03/06/19  08:37    


 


Monocytes # (Manual)  0.3 10^3/uL (0.0-1.0)   03/06/19  08:37    


 


Eosinophils # (Manual)  0.1 10^3/uL (0-0.7)   03/06/19  08:37    


 


Basophils # (Manual)  0.0 10^3/uL (0-0.1)   03/06/19  08:37    


 


Differential Comment  MANUAL DIFFERENTIAL   03/06/19  08:37    


 


Manual Slide Review  Indicated   03/06/19  08:37    


 


Platelet Morphology  RARE GIANT PLATELETS  (NORMAL)   03/06/19  08:37    


 


Sodium  137 mmol/L (135-145)   03/12/19  06:07    


 


Potassium  3.4 mmol/L (3.5-5.0)  L  03/12/19  06:07    


 


Chloride  106 mmol/L (101-111)   03/12/19  06:07    


 


Carbon Dioxide  23 mmol/L (21-32)   03/12/19  06:07    


 


Anion Gap  8.0  (6-13)   03/12/19  06:07    


 


BUN  21 mg/dL (6-20)  H  03/12/19  06:07    


 


Creatinine  1.1 mg/dL (0.6-1.2)   03/12/19  06:07    


 


Estimated GFR (MDRD)  65  (>89)  L  03/12/19  06:07    


 


Glucose  106 mg/dL ()  H  03/12/19  06:07    


 


Lactic Acid  1.0 mmol/L (0.5-2.2)   03/10/19  09:15    


 


Calcium  8.1 mg/dL (8.5-10.3)  L  03/12/19  06:07    


 


Phosphorus  1.7 mg/dL (2.5-4.6)  L  03/12/19  06:07    


 


Total Bilirubin  0.6 mg/dL (0.2-1.0)   03/09/19  13:37    


 


AST  30 IU/L (10-42)   03/09/19  13:37    


 


ALT  14 IU/L (10-60)   03/09/19  13:37    


 


Alkaline Phosphatase  98 IU/L ()   03/09/19  13:37    


 


Total Creatine Kinase  122 IU/L ()   03/06/19  08:37    


 


Troponin I  < 0.04 ng/mL (<0.49)   03/09/19  13:37    


 


Total Protein  6.6 g/dL (6.7-8.2)  L  03/09/19  13:37    


 


Albumin  2.7 g/dL (3.2-5.5)  L  03/12/19  06:07    


 


Globulin  3.2 g/dL (2.1-4.2)   03/09/19  13:37    


 


Albumin/Globulin Ratio  1.1  (1.0-2.2)   03/09/19  13:37    


 


Lipase  30 U/L (22-51)   03/09/19  13:37    


 


Urine Color  YELLOW   03/08/19  18:29    


 


Urine Clarity  CLEAR  (CLEAR)   03/08/19  18:29    


 


Urine pH  6.0 PH (5.0-7.5)   03/08/19  18:29    


 


Ur Specific Gravity  1.025  (1.002-1.030)   03/08/19  18:29    


 


Urine Protein  NEGATIVE mg/dL (NEGATIVE)   03/08/19  18:29    


 


Urine Glucose (UA)  NEGATIVE mg/dL (NEGATIVE)   03/08/19  18:29    


 


Urine Ketones  15 mg/dL (NEGATIVE)  H  03/08/19  18:29    


 


Urine Occult Blood  SMALL  (NEGATIVE)  H  03/08/19  18:29    


 


Urine Nitrite  NEGATIVE  (NEGATIVE)   03/08/19  18:29    


 


Urine Bilirubin  NEGATIVE  (NEGATIVE)   03/08/19  18:29    


 


Urine Urobilinogen  0.2 (NORMAL) E.U./dL (NORMAL)   03/08/19  18:29    


 


Ur Leukocyte Esterase  NEGATIVE  (NEGATIVE)   03/08/19  18:29    


 


Urine RBC  6-10 /HPF (0-5)  H  03/08/19  18:29    


 


Urine WBC  0-3 /HPF (0-3)   03/08/19  18:29    


 


Ur Squamous Epith Cells  RARE Squamous  (<= Few)   03/08/19  18:29    


 


Urine Bacteria  Rare /HPF (None Seen)   03/08/19  18:29    


 


Urine Mucus  Few Strands   03/08/19  18:29    


 


Ur Microscopic Review  INDICATED   03/08/19  18:29    


 


Urine Culture Comments  NOT INDICATED   03/08/19  18:29    


 


Last Dose Date  UNK   03/12/19  09:30    


 


Last Dose Time  UNK   03/12/19  09:30    


 


Vancomycin Trough  17.4 ug/mL (10.0-20.0)   03/12/19  09:30    


 


Urine Opiates Screen  NEGATIVE  (NEGATIVE)   03/04/19  00:00    


 


Ur Oxycodone Screen  NEGATIVE  (NEGATIVE)   03/04/19  00:00    


 


Urine Methadone Screen  NEGATIVE  (NEGATIVE)   03/04/19  00:00    


 


Ur Propoxyphene Screen  NEGATIVE  (NEGATIVE)   03/04/19  00:00    


 


Ur Barbiturates Screen  NEGATIVE  (NEGATIVE)   03/04/19  00:00    


 


Ur Tricyclics Screen  NEGATIVE  (NEGATIVE)   03/04/19  00:00    


 


Ur Phencyclidine Scrn  NEGATIVE  (NEGATIVE)   03/04/19  00:00    


 


Ur Amphetamine Screen  NEGATIVE  (NEGATIVE)   03/04/19  00:00    


 


U Methamphetamines Scrn  NEGATIVE  (NEGATIVE)   03/04/19  00:00    


 


U Benzodiazepines Scrn  NEGATIVE  (NEGATIVE)   03/04/19  00:00    


 


Urine Cocaine Screen  NEGATIVE  (NEGATIVE)   03/04/19  00:00    


 


U Cannabinoids Screen  NEGATIVE  (NEGATIVE)   03/04/19  00:00    


 


C. difficile Tox B Gene  NEGATIVE  (NEGATIVE)   03/10/19  07:43    














Sepsis Event Note (H)





- Evaluation


Current Stage of Sepsis: Ruled out





ABX Reporting


Has patient been on IV antibiotics over the past 48 hours?: Yes

## 2019-03-13 LAB
ANION GAP SERPL CALCULATED.4IONS-SCNC: 9 MMOL/L (ref 6–13)
BASOPHILS # BLD MANUAL: 0 10^3/UL (ref 0–0.1)
BASOPHILS NFR BLD AUTO: 0.4 %
BASOPHILS NFR SPEC MANUAL: 1 %
BUN SERPL-MCNC: 19 MG/DL (ref 6–20)
CALCIUM UR-MCNC: 8.1 MG/DL (ref 8.5–10.3)
CHLORIDE SERPL-SCNC: 107 MMOL/L (ref 101–111)
CO2 SERPL-SCNC: 24 MMOL/L (ref 21–32)
CREAT SERPLBLD-SCNC: 1.1 MG/DL (ref 0.6–1.2)
EOSINOPHIL # BLD MANUAL: 0 10^3/UL (ref 0–0.7)
EOSINOPHIL NFR BLD AUTO: 0.1 %
ERYTHROCYTE [DISTWIDTH] IN BLOOD BY AUTOMATED COUNT: 16 % (ref 12–15)
GFRSERPLBLD MDRD-ARVRAT: 65 ML/MIN/{1.73_M2} (ref 89–?)
GLUCOSE SERPL-MCNC: 100 MG/DL (ref 70–100)
HGB UR QL STRIP: 11.2 G/DL (ref 14–18)
LYMPH ABN NFR BLD MANUAL: 0 %
LYMPHOBLASTS # BLD: 23 %
LYMPHOCYTES # BLD MANUAL: 0.6 10^3/UL (ref 1.5–3.5)
LYMPHOCYTES NFR BLD AUTO: 28.1 %
MCH RBC QN AUTO: 35.1 PG (ref 27–31)
MCHC RBC AUTO-ENTMCNC: 33.7 G/DL (ref 32–36)
MCV RBC AUTO: 104.3 FL (ref 80–94)
MONOCYTES # BLD MANUAL: 0.3 10^3/UL (ref 0–1)
MONOCYTES NFR BLD AUTO: 20.2 %
NEUTROPHILS # SNV AUTO: 2.4 X10^3/UL (ref 4.8–10.8)
NEUTROPHILS NFR BLD AUTO: 51.2 %
NEUTROPHILS NFR BLD MANUAL: 1.5 10^3/UL (ref 1.5–6.6)
NEUTS BAND NFR BLD MANUAL: 63 %
NEUTS BAND NFR BLD: 0 %
PDW BLD AUTO: 9.3 FL (ref 7.4–11.4)
PLATELET # BLD: 103 10^3/UL (ref 130–450)
PLATELET BLD QL SMEAR: (no result)
RBC MAR: 3.19 10^6/UL (ref 4.7–6.1)
RBC MORPH BLD: (no result)
SODIUM SERPLBLD-SCNC: 140 MMOL/L (ref 135–145)

## 2019-03-13 RX ADMIN — CLINDAMYCIN PHOSPHATE SCH MLS/HR: 600 INJECTION, SOLUTION INTRAVENOUS at 00:29

## 2019-03-13 RX ADMIN — SODIUM CHLORIDE SCH MLS/HR: 9 INJECTION, SOLUTION INTRAVENOUS at 22:09

## 2019-03-13 RX ADMIN — SODIUM CHLORIDE SCH MLS/HR: 9 INJECTION, SOLUTION INTRAVENOUS at 09:50

## 2019-03-13 RX ADMIN — FAMOTIDINE SCH MG: 20 TABLET, FILM COATED ORAL at 20:22

## 2019-03-13 RX ADMIN — MEMANTINE HYDROCHLORIDE SCH MG: 5 TABLET ORAL at 08:39

## 2019-03-13 RX ADMIN — MEMANTINE HYDROCHLORIDE SCH MG: 5 TABLET ORAL at 20:22

## 2019-03-13 RX ADMIN — NICOTINE SCH PATCH: 14 PATCH TRANSDERMAL at 08:40

## 2019-03-13 RX ADMIN — POLYETHYLENE GLYCOL 3350 SCH GM: 17 POWDER, FOR SOLUTION ORAL at 08:39

## 2019-03-13 RX ADMIN — CLINDAMYCIN PHOSPHATE SCH MLS/HR: 600 INJECTION, SOLUTION INTRAVENOUS at 17:57

## 2019-03-13 RX ADMIN — CLINDAMYCIN PHOSPHATE SCH MLS/HR: 600 INJECTION, SOLUTION INTRAVENOUS at 05:49

## 2019-03-13 RX ADMIN — SODIUM CHLORIDE, PRESERVATIVE FREE SCH ML: 5 INJECTION INTRAVENOUS at 08:46

## 2019-03-13 RX ADMIN — FAMOTIDINE SCH MG: 20 TABLET, FILM COATED ORAL at 08:39

## 2019-03-13 RX ADMIN — SODIUM CHLORIDE, PRESERVATIVE FREE SCH: 5 INJECTION INTRAVENOUS at 08:40

## 2019-03-13 RX ADMIN — SODIUM CHLORIDE, PRESERVATIVE FREE SCH: 5 INJECTION INTRAVENOUS at 05:49

## 2019-03-13 RX ADMIN — Medication SCH TAB: at 08:39

## 2019-03-13 RX ADMIN — ENOXAPARIN SODIUM SCH MG: 100 INJECTION SUBCUTANEOUS at 08:40

## 2019-03-13 RX ADMIN — CLINDAMYCIN PHOSPHATE SCH MLS/HR: 600 INJECTION, SOLUTION INTRAVENOUS at 11:18

## 2019-03-13 NOTE — PROVIDER PROGRESS NOTE
Assessment/Plan





- Problem List


(1) HCAP (healthcare-associated pneumonia)


Assessment/Plan: 


Continue iv antibiotics, since he is improving.


Will transition to po antibiotics.








(2) Aspiration pneumonia


Assessment/Plan: 


Will transition from iv Clinda to po antibiotics tomorrow.








(3) Facial fracture


Qualifiers: 


   Encounter type: subsequent encounter   Facial bone/location: other facial 

bone   Fracture type: closed   Laterality: left 


Assessment/Plan: 


Stable.








(4) Fall


Qualifiers: 


   Encounter type: subsequent encounter   Qualified Code(s): W19.XXXD - Unspeci

fied fall, subsequent encounter   


Assessment/Plan: 


PT is starting to work with him.








(5) Frequent falls


Assessment/Plan: 


As above.


He will need DCh to an assisted living facility.








(6) Alzheimer's dementia with behavioral disturbance


Assessment/Plan: 


Stable








(7) Hypokalemia


Assessment/Plan: 


Replace and will start daily KCL po.


Follow BMP daily.








(8) IAN (acute kidney injury)


Assessment/Plan: 


Resolved with iv hydration, which was stopped yesterday.








- Current Meds


Current Meds: 





                               Current Medications











Generic Name Dose Route Start Last Admin





  Trade Name Freq  PRN Reason Stop Dose Admin


 


Enoxaparin Sodium  40 mg  03/11/19 09:00  03/13/19 08:40





  Lovenox  SUBQ   40 mg





  DAILY JAISON   Administration





     





     





     





     


 


Famotidine  20 mg  03/10/19 21:00  03/13/19 08:39





  Pepcid  PO   20 mg





  BID JAISON   Administration





     





     





     





     


 


Guaifenesin  600 mg  03/12/19 21:00  03/13/19 08:39





  Mucinex  PO   600 mg





  BID JAISON   Administration





     





     





     





     


 


Vancomycin HCl 1 gm/ Sodium  250 mls @ 166.667 mls/hr  03/10/19 22:00  03/13/19 

11:00





  Chloride  IV   0 mls/hr





  Q12H JAISON   Infusion





     





     





     





     


 


Levofloxacin  750 mg in 150 mls @ 100 mls/hr  03/10/19 17:00  03/12/19 19:16





  Levaquin 750 Mg/150 Ml  IV   Infused





  Q24H JAISON   Infusion





     





     





     





     


 


Clindamycin Phosphate  50 mls @ 100 mls/hr  03/12/19 12:00  03/13/19 12:30





  Cleocin 600 Mg/50 Ml  IV   Infused





  Q6HR JAISON   Infusion





     





     





     





     


 


Memantine  5 mg  03/10/19 21:00  03/13/19 08:39





  Namenda  PO   5 mg





  BID JAISON   Administration





     





     





     





     


 


Multivitamins/Minerals  1 tab  03/11/19 16:00  03/13/19 08:39





  Theragran  M  PO   1 tab





  DAILYWM JAISON   Administration





     





     





     





     


 


Nicotine  1 patch  03/11/19 09:00  03/13/19 08:40





  Nicoderm  TOP   1 patch





  DAILY JAISON   Administration





     





     





     





     


 


Polyethylene Glycol  17 gm  03/11/19 09:00  03/13/19 08:39





  Miralax  PO   17 gm





  DAILY JAISON   Administration





     





     





     





     


 


Sodium Chloride  10 ml  03/10/19 17:00  03/13/19 08:46





  Normal Saline Flush 0.9%  IVP   10 ml





  0100,0900,1700 JAISON   Administration





     





     





     





     


 


Trazodone HCl  50 mg  03/11/19 21:44  03/11/19 21:53





  Desyrel  PO   50 mg





  QPM PRN   Administration





  Insomnia   





     





     





     














- Lab Result


Fish Bone Diagrams: 


                                 03/13/19 05:05





                                 03/13/19 05:05





- Additional Planning


My Orders: 





My Active Orders





03/12/19 21:00


guaiFENesin [Mucinex]   600 mg PO BID 





03/14/19 05:00


BMP - BASIC METABOLIC PANEL [CHEM] DAILYLAB 





03/15/19 05:00


BMP - BASIC METABOLIC PANEL [CHEM] DAILYLAB 














Subjective





- Subjective


Patient Reports: Feeling Better, Other (Answers appropriately, but slowly, in 1-

2 word answers.)


Nursing Reports: Other (Able to sit in a chair, feed himself)





Objective


Vital Signs: 





                               Vital Signs - 24 hr











  03/12/19 03/12/19 03/13/19





  16:00 22:54 00:00


 


Temperature 37.0 C  36.8 C


 


Heart Rate [ 77  59 L





Brachial]   


 


Respiratory 20  18





Rate   


 


Blood Pressure 127/62  130/66





[Right Brachial   





artery]   


 


O2 Saturation 94 94 94














  03/13/19 03/13/19





  06:02 07:30


 


Temperature  36.6 C


 


Heart Rate [ 58 L 58 L





Brachial]  


 


Respiratory 18 18





Rate  


 


Blood Pressure 128/70 114/62





[Right Brachial  





artery]  


 


O2 Saturation 93 93








                                     Oxygen











O2 Source                      Room air














I&O (Last 24 Hrs): 





                          Intake and Output Totals x24h











 03/11/19 03/12/19 03/13/19





 23:59 23:59 23:59


 


Intake Total 2803.75 2923.75 2670.166


 


Balance 2803.75 2923.75 2670.166











General: Alert


HEENT: Mucous membr. moist/pink, Other (L eye hematoma)


Neck: Supple, No JVD


Neuro: Other (Disoriented, able to follow commands.)


Cardiovascular: Regular rate, No murmurs


Respiratory: No respiratory distress, Other (Poor air movement but no wheezes , 

rales or rhonchi)


Abdomen: Soft, No tenderness


Extremities: No edema, Other (Multiple ecchymoses)





- Results


Results: 





                               Laboratory Results











WBC  2.4 x10^3/uL (4.8-10.8)  L  03/13/19  05:05    


 


RBC  3.19 10^6/uL (4.70-6.10)  L  03/13/19  05:05    


 


Hgb  11.2 g/dL (14.0-18.0)  L  03/13/19  05:05    


 


Hct  33.3 % (42.0-52.0)  L  03/13/19  05:05    


 


MCV  104.3 fL (80.0-94.0)  H  03/13/19  05:05    


 


MCH  35.1 pg (27.0-31.0)  H  03/13/19  05:05    


 


MCHC  33.7 g/dL (32.0-36.0)   03/13/19  05:05    


 


RDW  16.0 % (12.0-15.0)  H  03/13/19  05:05    


 


Plt Count  103 10^3/uL (130-450)  L  03/13/19  05:05    


 


MPV  9.3 fL (7.4-11.4)   03/13/19  05:05    


 


Neut # (Auto)  Not Reportable   03/13/19  05:05    


 


Lymph # (Auto)  Not Reportable   03/13/19  05:05    


 


Mono # (Auto)  Not Reportable   03/13/19  05:05    


 


Eos # (Auto)  Not Reportable   03/13/19  05:05    


 


Baso # (Auto)  Not Reportable   03/13/19  05:05    


 


Absolute Nucleated RBC  Not Reportable   03/13/19  05:05    


 


Total Counted  100   03/13/19  05:05    


 


Band Neuts % (Manual)  0 % (0-10)  03/13/19  05:05    


 


Reactive Lymphs % (Man)  6 %  03/06/19  08:37    


 


Abnorm Lymph % (Manual)  0 %  03/13/19  05:05    


 


Nucleated RBC %  Not Reportable   03/13/19  05:05    


 


Neutrophils # (Manual)  1.5 10^3/uL (1.5-6.6)   03/13/19  05:05    


 


Lymphocytes # (Manual)  0.6 10^3/uL (1.5-3.5)  L  03/13/19  05:05    


 


Monocytes # (Manual)  0.3 10^3/uL (0.0-1.0)   03/13/19  05:05    


 


Eosinophils # (Manual)  0.0 10^3/uL (0-0.7)   03/13/19  05:05    


 


Basophils # (Manual)  0.0 10^3/uL (0-0.1)   03/13/19  05:05    


 


Differential Comment  MANUAL DIFFERENTIAL   03/06/19  08:37    


 


Manual Slide Review  Indicated   03/06/19  08:37    


 


Platelet Estimate  DECREASED (<130,000)  (NORMAL)   03/13/19  05:05    


 


Platelet Morphology  RARE GIANT PLATELETS  (NORMAL)   03/06/19  08:37    


 


RBC Morph Micro Appear  1+ ANISOCYTOSIS  (NORMAL)   03/13/19  05:05    


 


Sodium  140 mmol/L (135-145)   03/13/19  05:05    


 


Potassium  3.4 mmol/L (3.5-5.0)  L  03/13/19  05:05    


 


Chloride  107 mmol/L (101-111)   03/13/19  05:05    


 


Carbon Dioxide  24 mmol/L (21-32)   03/13/19  05:05    


 


Anion Gap  9.0  (6-13)   03/13/19  05:05    


 


BUN  19 mg/dL (6-20)   03/13/19  05:05    


 


Creatinine  1.1 mg/dL (0.6-1.2)   03/13/19  05:05    


 


Estimated GFR (MDRD)  65  (>89)  L  03/13/19  05:05    


 


Glucose  100 mg/dL ()   03/13/19  05:05    


 


Lactic Acid  1.0 mmol/L (0.5-2.2)   03/10/19  09:15    


 


Calcium  8.1 mg/dL (8.5-10.3)  L  03/13/19  05:05    


 


Phosphorus  1.7 mg/dL (2.5-4.6)  L  03/12/19  06:07    


 


Total Bilirubin  0.6 mg/dL (0.2-1.0)   03/09/19  13:37    


 


AST  30 IU/L (10-42)   03/09/19  13:37    


 


ALT  14 IU/L (10-60)   03/09/19  13:37    


 


Alkaline Phosphatase  98 IU/L ()   03/09/19  13:37    


 


Total Creatine Kinase  122 IU/L ()   03/06/19  08:37    


 


Troponin I  < 0.04 ng/mL (<0.49)   03/09/19  13:37    


 


Total Protein  6.6 g/dL (6.7-8.2)  L  03/09/19  13:37    


 


Albumin  2.7 g/dL (3.2-5.5)  L  03/12/19  06:07    


 


Globulin  3.2 g/dL (2.1-4.2)   03/09/19  13:37    


 


Albumin/Globulin Ratio  1.1  (1.0-2.2)   03/09/19  13:37    


 


Lipase  30 U/L (22-51)   03/09/19  13:37    


 


Urine Color  YELLOW   03/08/19  18:29    


 


Urine Clarity  CLEAR  (CLEAR)   03/08/19  18:29    


 


Urine pH  6.0 PH (5.0-7.5)   03/08/19  18:29    


 


Ur Specific Gravity  1.025  (1.002-1.030)   03/08/19  18:29    


 


Urine Protein  NEGATIVE mg/dL (NEGATIVE)   03/08/19  18:29    


 


Urine Glucose (UA)  NEGATIVE mg/dL (NEGATIVE)   03/08/19  18:29    


 


Urine Ketones  15 mg/dL (NEGATIVE)  H  03/08/19  18:29    


 


Urine Occult Blood  SMALL  (NEGATIVE)  H  03/08/19  18:29    


 


Urine Nitrite  NEGATIVE  (NEGATIVE)   03/08/19  18:29    


 


Urine Bilirubin  NEGATIVE  (NEGATIVE)   03/08/19  18:29    


 


Urine Urobilinogen  0.2 (NORMAL) E.U./dL (NORMAL)   03/08/19  18:29    


 


Ur Leukocyte Esterase  NEGATIVE  (NEGATIVE)   03/08/19  18:29    


 


Urine RBC  6-10 /HPF (0-5)  H  03/08/19  18:29    


 


Urine WBC  0-3 /HPF (0-3)   03/08/19  18:29    


 


Ur Squamous Epith Cells  RARE Squamous  (<= Few)   03/08/19  18:29    


 


Urine Bacteria  Rare /HPF (None Seen)   03/08/19  18:29    


 


Urine Mucus  Few Strands   03/08/19  18:29    


 


Ur Microscopic Review  INDICATED   03/08/19  18:29    


 


Urine Culture Comments  NOT INDICATED   03/08/19  18:29    


 


Last Dose Date  UNK   03/12/19  09:30    


 


Last Dose Time  UNK   03/12/19  09:30    


 


Vancomycin Trough  17.4 ug/mL (10.0-20.0)   03/12/19  09:30    


 


Urine Opiates Screen  NEGATIVE  (NEGATIVE)   03/04/19  00:00    


 


Ur Oxycodone Screen  NEGATIVE  (NEGATIVE)   03/04/19  00:00    


 


Urine Methadone Screen  NEGATIVE  (NEGATIVE)   03/04/19  00:00    


 


Ur Propoxyphene Screen  NEGATIVE  (NEGATIVE)   03/04/19  00:00    


 


Ur Barbiturates Screen  NEGATIVE  (NEGATIVE)   03/04/19  00:00    


 


Ur Tricyclics Screen  NEGATIVE  (NEGATIVE)   03/04/19  00:00    


 


Ur Phencyclidine Scrn  NEGATIVE  (NEGATIVE)   03/04/19  00:00    


 


Ur Amphetamine Screen  NEGATIVE  (NEGATIVE)   03/04/19  00:00    


 


U Methamphetamines Scrn  NEGATIVE  (NEGATIVE)   03/04/19  00:00    


 


U Benzodiazepines Scrn  NEGATIVE  (NEGATIVE)   03/04/19  00:00    


 


Urine Cocaine Screen  NEGATIVE  (NEGATIVE)   03/04/19  00:00    


 


U Cannabinoids Screen  NEGATIVE  (NEGATIVE)   03/04/19  00:00    


 


C. difficile Tox B Gene  NEGATIVE  (NEGATIVE)   03/10/19  07:43    














Sepsis Event Note (H)





- Evaluation


Current Stage of Sepsis: Ruled out

## 2019-03-14 LAB
ANION GAP SERPL CALCULATED.4IONS-SCNC: 7 MMOL/L (ref 6–13)
BASOPHILS NFR BLD AUTO: 0 10^3/UL (ref 0–0.1)
BASOPHILS NFR BLD AUTO: 0.4 %
BUN SERPL-MCNC: 16 MG/DL (ref 6–20)
CALCIUM UR-MCNC: 7.9 MG/DL (ref 8.5–10.3)
CHLORIDE SERPL-SCNC: 105 MMOL/L (ref 101–111)
CO2 SERPL-SCNC: 25 MMOL/L (ref 21–32)
CREAT SERPLBLD-SCNC: 1 MG/DL (ref 0.6–1.2)
EOSINOPHIL # BLD AUTO: 0 10^3/UL (ref 0–0.7)
EOSINOPHIL NFR BLD AUTO: 0.1 %
ERYTHROCYTE [DISTWIDTH] IN BLOOD BY AUTOMATED COUNT: 15.6 % (ref 12–15)
GFRSERPLBLD MDRD-ARVRAT: 73 ML/MIN/{1.73_M2} (ref 89–?)
GLUCOSE SERPL-MCNC: 100 MG/DL (ref 70–100)
HGB UR QL STRIP: 11.5 G/DL (ref 14–18)
LYMPHOCYTES # SPEC AUTO: 0.8 10^3/UL (ref 1.5–3.5)
LYMPHOCYTES NFR BLD AUTO: 26.8 %
MCH RBC QN AUTO: 35 PG (ref 27–31)
MCHC RBC AUTO-ENTMCNC: 34.5 G/DL (ref 32–36)
MCV RBC AUTO: 101.5 FL (ref 80–94)
MONOCYTES # BLD AUTO: 0.5 10^3/UL (ref 0–1)
MONOCYTES NFR BLD AUTO: 19.3 %
NEUTROPHILS # BLD AUTO: 1.5 10^3/UL (ref 1.5–6.6)
NEUTROPHILS # SNV AUTO: 2.8 X10^3/UL (ref 4.8–10.8)
NEUTROPHILS NFR BLD AUTO: 53.4 %
PDW BLD AUTO: 9.2 FL (ref 7.4–11.4)
PLATELET # BLD: 113 10^3/UL (ref 130–450)
PLATELET BLD QL SMEAR: (no result)
RBC MAR: 3.29 10^6/UL (ref 4.7–6.1)
RBC MORPH BLD: (no result)
SODIUM SERPLBLD-SCNC: 137 MMOL/L (ref 135–145)

## 2019-03-14 RX ADMIN — CLINDAMYCIN PHOSPHATE SCH MLS/HR: 600 INJECTION, SOLUTION INTRAVENOUS at 00:36

## 2019-03-14 RX ADMIN — Medication SCH TAB: at 08:26

## 2019-03-14 RX ADMIN — FAMOTIDINE SCH MG: 20 TABLET, FILM COATED ORAL at 08:26

## 2019-03-14 RX ADMIN — ENOXAPARIN SODIUM SCH MG: 100 INJECTION SUBCUTANEOUS at 08:30

## 2019-03-14 RX ADMIN — SODIUM CHLORIDE, PRESERVATIVE FREE SCH ML: 5 INJECTION INTRAVENOUS at 10:07

## 2019-03-14 RX ADMIN — FAMOTIDINE SCH MG: 20 TABLET, FILM COATED ORAL at 21:23

## 2019-03-14 RX ADMIN — SODIUM CHLORIDE SCH MLS/HR: 9 INJECTION, SOLUTION INTRAVENOUS at 09:59

## 2019-03-14 RX ADMIN — CLINDAMYCIN PHOSPHATE SCH MLS/HR: 600 INJECTION, SOLUTION INTRAVENOUS at 06:06

## 2019-03-14 RX ADMIN — CLINDAMYCIN HYDROCHLORIDE SCH MG: 150 CAPSULE ORAL at 21:23

## 2019-03-14 RX ADMIN — NICOTINE SCH PATCH: 14 PATCH TRANSDERMAL at 08:35

## 2019-03-14 RX ADMIN — MEMANTINE HYDROCHLORIDE SCH MG: 5 TABLET ORAL at 21:23

## 2019-03-14 RX ADMIN — SODIUM CHLORIDE, PRESERVATIVE FREE SCH ML: 5 INJECTION INTRAVENOUS at 00:43

## 2019-03-14 RX ADMIN — MEMANTINE HYDROCHLORIDE SCH MG: 5 TABLET ORAL at 08:26

## 2019-03-14 RX ADMIN — POLYETHYLENE GLYCOL 3350 SCH GM: 17 POWDER, FOR SOLUTION ORAL at 08:29

## 2019-03-14 RX ADMIN — CLINDAMYCIN PHOSPHATE SCH MLS/HR: 600 INJECTION, SOLUTION INTRAVENOUS at 11:39

## 2019-03-14 RX ADMIN — SODIUM CHLORIDE, PRESERVATIVE FREE SCH: 5 INJECTION INTRAVENOUS at 17:13

## 2019-03-14 NOTE — PROVIDER PROGRESS NOTE
Assessment/Plan





- Problem List


(1) HCAP (healthcare-associated pneumonia)


Assessment/Plan: 


Continue antibiotics and pulmonary toilet.








(2) Aspiration pneumonia


Assessment/Plan: 


In order to qualify for certain Assisted Living Facility ([facility] staff came 

and evaluated him today), he should be able to tolerate a soft mechanical diet.


A repeat swallow eval was ordered and the Speech Therapist said he failed due to

muscle fatigue, she will test him eating a soft mwchanical, cut up diet tomorrow

(at lunch, due to her schedule).


Continue antibiotics and pulmonary toilet.








(3) Facial fracture


Qualifiers: 


   Encounter type: subsequent encounter   Facial bone/location: other facial 

bone   Fracture type: closed   Laterality: left 


Assessment/Plan: 


No complaints from patient, however perhaps the fatigue with chewing could be 

from this fracture.








(4) Fall


Qualifiers: 


   Encounter type: subsequent encounter   Qualified Code(s): W19.XXXD - 

Unspecified fall, subsequent encounter   


Assessment/Plan: 


Ecchymosis progressing and healing.








(5) Frequent falls


Assessment/Plan: 


He will need placement in a Living Facility, and may not live alone. 


 is reaching out to facilities to get him accepted.


PT underway daily while here.








(6) Alzheimer's dementia with behavioral disturbance


Assessment/Plan: 


Stable, with disorientation to place and time and forgetful.


The admission H & P described that he lived alone, ate TV dinners, had frequent 

visits to the ER for falls.


I tried to reach the friend, Tiffany Esposito, listed on his demographic sheet, to 

determine if he has a family that I can speak to or any Advanced Directives or 

POLST form, any PCP (to determine if any dementia work-up had been done), and I 

left a message for her to call back.








(7) Leukopenia


Assessment/Plan: 


This is likely from his infection.


Follow CBC daily.








(8) Tobacco use


Assessment/Plan: 


The patient was put on a Nicotine patch at admission, 14 mg.


I will start weaning the dose down, will start 7 mg tomorrow, and stop it after 

1 week.








(9) Hypokalemia


Assessment/Plan: 


Resolved with replacement.








(10) IAN (acute kidney injury)


Assessment/Plan: 


Resolved with iv hydration, now stopped and he is orally hydrating.








- Current Meds


Current Meds: 





                               Current Medications











Generic Name Dose Route Start Last Admin





  Trade Name Freq  PRN Reason Stop Dose Admin


 


Enoxaparin Sodium  40 mg  03/11/19 09:00  03/14/19 08:30





  Lovenox  SUBQ   40 mg





  DAILY JAISON   Administration





     





     





     





     


 


Famotidine  20 mg  03/10/19 21:00  03/14/19 08:26





  Pepcid  PO   20 mg





  BID JAISON   Administration





     





     





     





     


 


Guaifenesin  600 mg  03/12/19 21:00  03/14/19 08:26





  Mucinex  PO   600 mg





  BID JAISON   Administration





     





     





     





     


 


Vancomycin HCl 1 gm/ Sodium  250 mls @ 166.667 mls/hr  03/10/19 22:00  03/14/19 

11:41





  Chloride  IV   Infused





  Q12H JAISON   Infusion





     





     





     





     


 


Levofloxacin  750 mg in 150 mls @ 100 mls/hr  03/10/19 17:00  03/13/19 17:55





  Levaquin 750 Mg/150 Ml  IV   Infused





  Q24H JAISON   Infusion





     





     





     





     


 


Clindamycin Phosphate  50 mls @ 100 mls/hr  03/12/19 12:00  03/14/19 12:15





  Cleocin 600 Mg/50 Ml  IV   Infused





  Q6HR JAISON   Infusion





     





     





     





     


 


Memantine  5 mg  03/10/19 21:00  03/14/19 08:26





  Namenda  PO   5 mg





  BID JAISON   Administration





     





     





     





     


 


Multivitamins/Minerals  1 tab  03/11/19 16:00  03/14/19 08:26





  Theragran  M  PO   1 tab





  DAILYWM JAISON   Administration





     





     





     





     


 


Nicotine  1 patch  03/11/19 09:00  03/14/19 08:35





  Nicoderm  TOP   1 patch





  DAILY JAISON   Administration





     





     





     





     


 


Polyethylene Glycol  17 gm  03/11/19 09:00  03/14/19 08:29





  Miralax  PO   17 gm





  DAILY JAISON   Administration





     





     





     





     


 


Sodium Chloride  10 ml  03/10/19 11:56  03/13/19 17:58





  Normal Saline Flush 0.9%  IVP   10 ml





  PRN PRN   Administration





  AS NEEDED PER PROVIDER ORDERS   





     





     





     


 


Sodium Chloride  10 ml  03/10/19 17:00  03/14/19 10:07





  Normal Saline Flush 0.9%  IVP   10 ml





  0100,0900,1700 JAISON   Administration





     





     





     





     


 


Trazodone HCl  50 mg  03/11/19 21:44  03/11/19 21:53





  Desyrel  PO   50 mg





  QPM PRN   Administration





  Insomnia   





     





     





     














- Lab Result


Fish Bone Diagrams: 


                                 03/14/19 04:20





                                 03/14/19 04:20





- Additional Planning


My Orders: 





My Active Orders





03/14/19


Clinical Swallow Evaluation [ST] Routine 





03/15/19 05:00


BMP - BASIC METABOLIC PANEL [CHEM] DAILYLAB 














Subjective





- Subjective


Patient Reports: No Complaints


Nursing Reports: Other (He tolerates eating a chewed Oreo cookie, moistened with

 milk.)





Objective


Vital Signs: 





                               Vital Signs - 24 hr











  03/13/19 03/14/19 03/14/19





  15:31 00:00 07:45


 


Temperature 36.6 C 37.1 C 37.0 C


 


Heart Rate [ 64 66 54 L





Brachial]   


 


Respiratory 24 20 18





Rate   


 


Blood Pressure 136/62 H 160/69 H 136/68 H





[Right Brachial   





artery]   


 


O2 Saturation 97 96 96








                                     Oxygen











O2 Source                      Room air














I&O (Last 24 Hrs): 





                          Intake and Output Totals x24h











 03/12/19 03/13/19 03/14/19





 23:59 23:59 23:59


 


Intake Total 2923.75 3625.333 1710


 


Balance 2923.75 3625.333 1710











General: Alert


HEENT: Other (L eye ecchymosis below and scab above L eye.)


Neck: Supple


Neuro: Other (Oriented to person only.)


Cardiovascular: Regular rate


Respiratory: No respiratory distress


Abdomen: Soft


Extremities: No edema





- Results


Results: 





                               Laboratory Results











WBC  2.8 x10^3/uL (4.8-10.8)  L  03/14/19  04:20    


 


RBC  3.29 10^6/uL (4.70-6.10)  L  03/14/19  04:20    


 


Hgb  11.5 g/dL (14.0-18.0)  L  03/14/19  04:20    


 


Hct  33.4 % (42.0-52.0)  L  03/14/19  04:20    


 


MCV  101.5 fL (80.0-94.0)  H  03/14/19  04:20    


 


MCH  35.0 pg (27.0-31.0)  H  03/14/19  04:20    


 


MCHC  34.5 g/dL (32.0-36.0)   03/14/19  04:20    


 


RDW  15.6 % (12.0-15.0)  H  03/14/19  04:20    


 


Plt Count  113 10^3/uL (130-450)  L  03/14/19  04:20    


 


MPV  9.2 fL (7.4-11.4)   03/14/19  04:20    


 


Neut # (Auto)  1.5 10^3/uL (1.5-6.6)   03/14/19  04:20    


 


Lymph # (Auto)  0.8 10^3/uL (1.5-3.5)  L  03/14/19  04:20    


 


Mono # (Auto)  0.5 10^3/uL (0.0-1.0)   03/14/19  04:20    


 


Eos # (Auto)  0.0 10^3/uL (0.0-0.7)   03/14/19  04:20    


 


Baso # (Auto)  0.0 10^3/uL (0.0-0.1)   03/14/19  04:20    


 


Absolute Nucleated RBC  0.00 x10^3/uL  03/14/19  04:20    


 


Total Counted  100   03/13/19  05:05    


 


Band Neuts % (Manual)  0 % (0-10)  03/13/19  05:05    


 


Reactive Lymphs % (Man)  6 %  03/06/19  08:37    


 


Abnorm Lymph % (Manual)  0 %  03/13/19  05:05    


 


Nucleated RBC %  0.1 /100WBC  03/14/19  04:20    


 


Neutrophils # (Manual)  1.5 10^3/uL (1.5-6.6)   03/13/19  05:05    


 


Lymphocytes # (Manual)  0.6 10^3/uL (1.5-3.5)  L  03/13/19  05:05    


 


Monocytes # (Manual)  0.3 10^3/uL (0.0-1.0)   03/13/19  05:05    


 


Eosinophils # (Manual)  0.0 10^3/uL (0-0.7)   03/13/19  05:05    


 


Basophils # (Manual)  0.0 10^3/uL (0-0.1)   03/13/19  05:05    


 


Differential Comment  MANUAL DIFFERENTIAL   03/06/19  08:37    


 


Manual Slide Review  Indicated   03/14/19  04:20    


 


Platelet Estimate  DECREASED (<130,000)  (NORMAL)   03/14/19  04:20    


 


Platelet Morphology  RARE GIANT PLATELETS  (NORMAL)   03/06/19  08:37    


 


RBC Morph Micro Appear  1+ ANISOCYTOSIS  (NORMAL)   03/14/19  04:20    


 


Sodium  137 mmol/L (135-145)   03/14/19  04:20    


 


Potassium  3.6 mmol/L (3.5-5.0)   03/14/19  04:20    


 


Chloride  105 mmol/L (101-111)   03/14/19  04:20    


 


Carbon Dioxide  25 mmol/L (21-32)   03/14/19  04:20    


 


Anion Gap  7.0  (6-13)   03/14/19  04:20    


 


BUN  16 mg/dL (6-20)   03/14/19  04:20    


 


Creatinine  1.0 mg/dL (0.6-1.2)   03/14/19  04:20    


 


Estimated GFR (MDRD)  73  (>89)  L  03/14/19  04:20    


 


Glucose  100 mg/dL ()   03/14/19  04:20    


 


POC Whole Bld Glucose  70 mg/dL (70 - 100)   03/09/19  11:34    


 


Lactic Acid  1.0 mmol/L (0.5-2.2)   03/10/19  09:15    


 


Calcium  7.9 mg/dL (8.5-10.3)  L  03/14/19  04:20    


 


Phosphorus  1.7 mg/dL (2.5-4.6)  L  03/12/19  06:07    


 


Total Bilirubin  0.6 mg/dL (0.2-1.0)   03/09/19  13:37    


 


AST  30 IU/L (10-42)   03/09/19  13:37    


 


ALT  14 IU/L (10-60)   03/09/19  13:37    


 


Alkaline Phosphatase  98 IU/L ()   03/09/19  13:37    


 


Total Creatine Kinase  122 IU/L ()   03/06/19  08:37    


 


Troponin I  < 0.04 ng/mL (<0.49)   03/09/19  13:37    


 


Total Protein  6.6 g/dL (6.7-8.2)  L  03/09/19  13:37    


 


Albumin  2.7 g/dL (3.2-5.5)  L  03/12/19  06:07    


 


Globulin  3.2 g/dL (2.1-4.2)   03/09/19  13:37    


 


Albumin/Globulin Ratio  1.1  (1.0-2.2)   03/09/19  13:37    


 


Lipase  30 U/L (22-51)   03/09/19  13:37    


 


Urine Color  YELLOW   03/08/19  18:29    


 


Urine Clarity  CLEAR  (CLEAR)   03/08/19  18:29    


 


Urine pH  6.0 PH (5.0-7.5)   03/08/19  18:29    


 


Ur Specific Gravity  1.025  (1.002-1.030)   03/08/19  18:29    


 


Urine Protein  NEGATIVE mg/dL (NEGATIVE)   03/08/19  18:29    


 


Urine Glucose (UA)  NEGATIVE mg/dL (NEGATIVE)   03/08/19  18:29    


 


Urine Ketones  15 mg/dL (NEGATIVE)  H  03/08/19  18:29    


 


Urine Occult Blood  SMALL  (NEGATIVE)  H  03/08/19  18:29    


 


Urine Nitrite  NEGATIVE  (NEGATIVE)   03/08/19  18:29    


 


Urine Bilirubin  NEGATIVE  (NEGATIVE)   03/08/19  18:29    


 


Urine Urobilinogen  0.2 (NORMAL) E.U./dL (NORMAL)   03/08/19  18:29    


 


Ur Leukocyte Esterase  NEGATIVE  (NEGATIVE)   03/08/19  18:29    


 


Urine RBC  6-10 /HPF (0-5)  H  03/08/19  18:29    


 


Urine WBC  0-3 /HPF (0-3)   03/08/19  18:29    


 


Ur Squamous Epith Cells  RARE Squamous  (<= Few)   03/08/19  18:29    


 


Urine Bacteria  Rare /HPF (None Seen)   03/08/19  18:29    


 


Urine Mucus  Few Strands   03/08/19  18:29    


 


Ur Microscopic Review  INDICATED   03/08/19  18:29    


 


Urine Culture Comments  NOT INDICATED   03/08/19  18:29    


 


Last Dose Date  UNK   03/12/19  09:30    


 


Last Dose Time  UNK   03/12/19  09:30    


 


Vancomycin Trough  17.4 ug/mL (10.0-20.0)   03/12/19  09:30    


 


Urine Opiates Screen  NEGATIVE  (NEGATIVE)   03/04/19  00:00    


 


Ur Oxycodone Screen  NEGATIVE  (NEGATIVE)   03/04/19  00:00    


 


Urine Methadone Screen  NEGATIVE  (NEGATIVE)   03/04/19  00:00    


 


Ur Propoxyphene Screen  NEGATIVE  (NEGATIVE)   03/04/19  00:00    


 


Ur Barbiturates Screen  NEGATIVE  (NEGATIVE)   03/04/19  00:00    


 


Ur Tricyclics Screen  NEGATIVE  (NEGATIVE)   03/04/19  00:00    


 


Ur Phencyclidine Scrn  NEGATIVE  (NEGATIVE)   03/04/19  00:00    


 


Ur Amphetamine Screen  NEGATIVE  (NEGATIVE)   03/04/19  00:00    


 


U Methamphetamines Scrn  NEGATIVE  (NEGATIVE)   03/04/19  00:00    


 


U Benzodiazepines Scrn  NEGATIVE  (NEGATIVE)   03/04/19  00:00    


 


Urine Cocaine Screen  NEGATIVE  (NEGATIVE)   03/04/19  00:00    


 


U Cannabinoids Screen  NEGATIVE  (NEGATIVE)   03/04/19  00:00    


 


C. difficile Tox B Gene  NEGATIVE  (NEGATIVE)   03/10/19  07:43    














Sepsis Event Note (H)





- Evaluation


Current Stage of Sepsis: Ruled out

## 2019-03-15 LAB
ANION GAP SERPL CALCULATED.4IONS-SCNC: 8 MMOL/L (ref 6–13)
BUN SERPL-MCNC: 15 MG/DL (ref 6–20)
CALCIUM UR-MCNC: 7.9 MG/DL (ref 8.5–10.3)
CHLORIDE SERPL-SCNC: 105 MMOL/L (ref 101–111)
CO2 SERPL-SCNC: 26 MMOL/L (ref 21–32)
CREAT SERPLBLD-SCNC: 0.9 MG/DL (ref 0.6–1.2)
GFRSERPLBLD MDRD-ARVRAT: 82 ML/MIN/{1.73_M2} (ref 89–?)
GLUCOSE SERPL-MCNC: 93 MG/DL (ref 70–100)
SODIUM SERPLBLD-SCNC: 139 MMOL/L (ref 135–145)

## 2019-03-15 RX ADMIN — MEMANTINE HYDROCHLORIDE SCH MG: 5 TABLET ORAL at 11:24

## 2019-03-15 RX ADMIN — FAMOTIDINE SCH MG: 20 TABLET, FILM COATED ORAL at 20:02

## 2019-03-15 RX ADMIN — Medication SCH TAB: at 11:29

## 2019-03-15 RX ADMIN — FAMOTIDINE SCH MG: 20 TABLET, FILM COATED ORAL at 11:24

## 2019-03-15 RX ADMIN — CLINDAMYCIN HYDROCHLORIDE SCH: 150 CAPSULE ORAL at 15:35

## 2019-03-15 RX ADMIN — MEMANTINE HYDROCHLORIDE SCH: 5 TABLET ORAL at 20:06

## 2019-03-15 RX ADMIN — CLINDAMYCIN HYDROCHLORIDE SCH MG: 150 CAPSULE ORAL at 11:23

## 2019-03-15 RX ADMIN — FAMOTIDINE SCH: 20 TABLET, FILM COATED ORAL at 15:35

## 2019-03-15 RX ADMIN — MEMANTINE HYDROCHLORIDE SCH MG: 5 TABLET ORAL at 20:02

## 2019-03-15 RX ADMIN — NICOTINE SCH PATCH: 7 PATCH TRANSDERMAL at 11:25

## 2019-03-15 RX ADMIN — ENOXAPARIN SODIUM SCH MG: 100 INJECTION SUBCUTANEOUS at 11:24

## 2019-03-15 RX ADMIN — Medication SCH: at 15:34

## 2019-03-15 RX ADMIN — CLINDAMYCIN HYDROCHLORIDE SCH MG: 150 CAPSULE ORAL at 20:02

## 2019-03-15 RX ADMIN — POLYETHYLENE GLYCOL 3350 SCH: 17 POWDER, FOR SOLUTION ORAL at 11:25

## 2019-03-15 NOTE — PROVIDER PROGRESS NOTE
Assessment/Plan





- Problem List


(1) Diarrhea


Assessment/Plan: 


He has been on antibiotics, therefore concern for C. diff


Will obtain stool for C. diff test.








(2) Dementia


Qualifiers: 


   Dementia type: unspecified type   Dementia behavioral disturbance: without 

behavioral disturbance   Qualified Code(s): F03.90 - Unspecified dementia 

without behavioral disturbance   


Assessment/Plan: 


He cannot recognize someone he lived with for 35 years and who had conversations

with him til his presentation to the ER this time.


He answers with facial expressions, nods or shakes his head and 1 word answers.


Social Work informed that he cannot indicate who he would choose as a DPOA. 


Hospital Administration (Gabriela Coronaford) was informed that he may need legal 

guardianship authorized and will need permanent placement when ready for [facility].








(3) HCAP (healthcare-associated pneumonia)


Assessment/Plan: 


Nearly no cough


Finish a course of 7 days of antibiotics  planned








(4) Aspiration pneumonia


Assessment/Plan: 


Finish a course of 7 days of antibiotics  planned








(5) Facial fracture


Qualifiers: 


   Encounter type: subsequent encounter   Facial bone/location: other facial 

bone   Fracture type: closed   Laterality: left 


Assessment/Plan: 


Slowly resolving ecchymosis











(7) Leukopenia


Assessment/Plan: 


Will follow CBC intermittently.








(8) Tobacco use


Assessment/Plan: 


On a Nicotine patch with taper to off planned.








(9) Hypokalemia


Assessment/Plan: 


Resolved








(10) IAN (acute kidney injury)


Assessment/Plan: 


Resolved








(11) Hypertrophic toenail


Assessment/Plan: 


The friend Tiffany says he was due for a Podiatrist appointment, but fell and has 

been hospitalized.


Will ask if our Wound Care would recommend management.








- Current Meds


Current Meds: 





                               Current Medications











Generic Name Dose Route Start Last Admin





  Trade Name Freq  PRN Reason Stop Dose Admin


 


Clindamycin HCl  600 mg  03/14/19 21:00  03/15/19 11:23





  Cleocin  PO   600 mg





  BID JAISON   Administration





     





     





     





     


 


Enoxaparin Sodium  40 mg  03/11/19 09:00  03/15/19 11:24





  Lovenox  SUBQ   40 mg





  DAILY JAISON   Administration





     





     





     





     


 


Famotidine  20 mg  03/10/19 21:00  03/15/19 11:24





  Pepcid  PO   20 mg





  BID JAISON   Administration





     





     





     





     


 


Guaifenesin  600 mg  03/12/19 21:00  03/15/19 11:24





  Mucinex  PO   600 mg





  BID JAISON   Administration





     





     





     





     


 


Levofloxacin  750 mg  03/15/19 09:00  03/15/19 11:24





  Levaquin  PO   750 mg





  DAILY JAISON   Administration





     





     





     





     


 


Memantine  5 mg  03/10/19 21:00  03/15/19 11:24





  Namenda  PO   5 mg





  BID JAISON   Administration





     





     





     





     


 


Multivitamins/Minerals  1 tab  03/11/19 16:00  03/15/19 11:29





  Theragran  M  PO   1 tab





  DAILYWM JAISON   Administration





     





     





     





     


 


Nicotine  1 patch  03/15/19 09:00  03/15/19 11:25





  Nicoderm  TOP  03/22/19 01:00  1 patch





  DAILY JAISON   Administration





     





     





     





     


 


Polyethylene Glycol  17 gm  03/11/19 09:00  03/15/19 11:25





  Miralax  PO   Not Given





  DAILY JAISON   





     





     





     





     


 


Trazodone HCl  50 mg  03/11/19 21:44  03/11/19 21:53





  Desyrel  PO   50 mg





  QPM PRN   Administration





  Insomnia   





     





     





     














- Lab Result


Fish Bone Diagrams: 


                                 03/14/19 04:20





                                 03/15/19 05:31





- Additional Planning


My Orders: 





My Active Orders





03/14/19 21:00


Clindamycin [Cleocin]   600 mg PO BID 





03/15/19 09:00


Nicotine 7 mg Patch [Nicoderm]   1 patch TOP DAILY 


levoFLOXacin [Levaquin]   750 mg PO DAILY 





03/15/19 11:46


Miscellaenous Nursing Order [RC] QSHIFT 





03/15/19 Lunch


Dysphagia Mechanically Altered Diet [DIET] 














Subjective





- Subjective


Patient Reports: Other (Cannot identify his friend Tiffany who is visiting and is 

his )


Nursing Reports: Other (Explosive watery diarrhea this a.m.)





Objective


Vital Signs: 





                               Vital Signs - 24 hr











  03/14/19 03/15/19 03/15/19





  16:00 00:00 08:00


 


Temperature 37.4 C 36.3 C L 36.3 C L


 


Heart Rate [ 65 69 56 L





Brachial]   


 


Respiratory 18 18 20





Rate   


 


Blood Pressure 132/56 H 154/69 H 146/68 H





[Right Brachial   





artery]   


 


O2 Saturation 95 96 93








                                     Oxygen











O2 Source                      Room air














I&O (Last 24 Hrs): 





                          Intake and Output Totals x24h











 03/13/19 03/14/19 03/15/19





 23:59 23:59 23:59


 


Intake Total 3625.333 1890 220


 


Balance 3625.333 1890 220











General: Other (Disoriented)


HEENT: Mucous membr. moist/pink, Other (Ecchymosis of L cheekbone)


Neuro: Disoriented, Other (Minimal speech but also answers with shaking head, 

nods and facial expressions)


Cardiovascular: Regular rate


Respiratory: No respiratory distress


Abdomen: Soft


Extremities: Other (Venous stasis. Toenails are all hypertrophisc and long, 

curling)





- Results


Results: 





                               Laboratory Results











WBC  2.8 x10^3/uL (4.8-10.8)  L  03/14/19  04:20    


 


RBC  3.29 10^6/uL (4.70-6.10)  L  03/14/19  04:20    


 


Hgb  11.5 g/dL (14.0-18.0)  L  03/14/19  04:20    


 


Hct  33.4 % (42.0-52.0)  L  03/14/19  04:20    


 


MCV  101.5 fL (80.0-94.0)  H  03/14/19  04:20    


 


MCH  35.0 pg (27.0-31.0)  H  03/14/19  04:20    


 


MCHC  34.5 g/dL (32.0-36.0)   03/14/19  04:20    


 


RDW  15.6 % (12.0-15.0)  H  03/14/19  04:20    


 


Plt Count  113 10^3/uL (130-450)  L  03/14/19  04:20    


 


MPV  9.2 fL (7.4-11.4)   03/14/19  04:20    


 


Neut # (Auto)  1.5 10^3/uL (1.5-6.6)   03/14/19  04:20    


 


Lymph # (Auto)  0.8 10^3/uL (1.5-3.5)  L  03/14/19  04:20    


 


Mono # (Auto)  0.5 10^3/uL (0.0-1.0)   03/14/19  04:20    


 


Eos # (Auto)  0.0 10^3/uL (0.0-0.7)   03/14/19  04:20    


 


Baso # (Auto)  0.0 10^3/uL (0.0-0.1)   03/14/19  04:20    


 


Absolute Nucleated RBC  0.00 x10^3/uL  03/14/19  04:20    


 


Total Counted  100   03/13/19  05:05    


 


Band Neuts % (Manual)  0 % (0-10)  03/13/19  05:05    


 


Reactive Lymphs % (Man)  6 %  03/06/19  08:37    


 


Abnorm Lymph % (Manual)  0 %  03/13/19  05:05    


 


Nucleated RBC %  0.1 /100WBC  03/14/19  04:20    


 


Neutrophils # (Manual)  1.5 10^3/uL (1.5-6.6)   03/13/19  05:05    


 


Lymphocytes # (Manual)  0.6 10^3/uL (1.5-3.5)  L  03/13/19  05:05    


 


Monocytes # (Manual)  0.3 10^3/uL (0.0-1.0)   03/13/19  05:05    


 


Eosinophils # (Manual)  0.0 10^3/uL (0-0.7)   03/13/19  05:05    


 


Basophils # (Manual)  0.0 10^3/uL (0-0.1)   03/13/19  05:05    


 


Differential Comment  MANUAL DIFFERENTIAL   03/06/19  08:37    


 


Manual Slide Review  Indicated   03/14/19  04:20    


 


Platelet Estimate  DECREASED (<130,000)  (NORMAL)   03/14/19  04:20    


 


Platelet Morphology  RARE GIANT PLATELETS  (NORMAL)   03/06/19  08:37    


 


RBC Morph Micro Appear  1+ ANISOCYTOSIS  (NORMAL)   03/14/19  04:20    


 


Sodium  139 mmol/L (135-145)   03/15/19  05:31    


 


Potassium  3.6 mmol/L (3.5-5.0)   03/15/19  05:31    


 


Chloride  105 mmol/L (101-111)   03/15/19  05:31    


 


Carbon Dioxide  26 mmol/L (21-32)   03/15/19  05:31    


 


Anion Gap  8.0  (6-13)   03/15/19  05:31    


 


BUN  15 mg/dL (6-20)   03/15/19  05:31    


 


Creatinine  0.9 mg/dL (0.6-1.2)   03/15/19  05:31    


 


Estimated GFR (MDRD)  82  (>89)  L  03/15/19  05:31    


 


Glucose  93 mg/dL ()   03/15/19  05:31    


 


POC Whole Bld Glucose  70 mg/dL (70 - 100)   03/09/19  11:34    


 


Lactic Acid  1.0 mmol/L (0.5-2.2)   03/10/19  09:15    


 


Calcium  7.9 mg/dL (8.5-10.3)  L  03/15/19  05:31    


 


Phosphorus  1.7 mg/dL (2.5-4.6)  L  03/12/19  06:07    


 


Total Bilirubin  0.6 mg/dL (0.2-1.0)   03/09/19  13:37    


 


AST  30 IU/L (10-42)   03/09/19  13:37    


 


ALT  14 IU/L (10-60)   03/09/19  13:37    


 


Alkaline Phosphatase  98 IU/L ()   03/09/19  13:37    


 


Total Creatine Kinase  122 IU/L ()   03/06/19  08:37    


 


Troponin I  < 0.04 ng/mL (<0.49)   03/09/19  13:37    


 


Total Protein  6.6 g/dL (6.7-8.2)  L  03/09/19  13:37    


 


Albumin  2.7 g/dL (3.2-5.5)  L  03/12/19  06:07    


 


Globulin  3.2 g/dL (2.1-4.2)   03/09/19  13:37    


 


Albumin/Globulin Ratio  1.1  (1.0-2.2)   03/09/19  13:37    


 


Lipase  30 U/L (22-51)   03/09/19  13:37    


 


Urine Color  YELLOW   03/08/19  18:29    


 


Urine Clarity  CLEAR  (CLEAR)   03/08/19  18:29    


 


Urine pH  6.0 PH (5.0-7.5)   03/08/19  18:29    


 


Ur Specific Gravity  1.025  (1.002-1.030)   03/08/19  18:29    


 


Urine Protein  NEGATIVE mg/dL (NEGATIVE)   03/08/19  18:29    


 


Urine Glucose (UA)  NEGATIVE mg/dL (NEGATIVE)   03/08/19  18:29    


 


Urine Ketones  15 mg/dL (NEGATIVE)  H  03/08/19  18:29    


 


Urine Occult Blood  SMALL  (NEGATIVE)  H  03/08/19  18:29    


 


Urine Nitrite  NEGATIVE  (NEGATIVE)   03/08/19  18:29    


 


Urine Bilirubin  NEGATIVE  (NEGATIVE)   03/08/19  18:29    


 


Urine Urobilinogen  0.2 (NORMAL) E.U./dL (NORMAL)   03/08/19  18:29    


 


Ur Leukocyte Esterase  NEGATIVE  (NEGATIVE)   03/08/19  18:29    


 


Urine RBC  6-10 /HPF (0-5)  H  03/08/19  18:29    


 


Urine WBC  0-3 /HPF (0-3)   03/08/19  18:29    


 


Ur Squamous Epith Cells  RARE Squamous  (<= Few)   03/08/19  18:29    


 


Urine Bacteria  Rare /HPF (None Seen)   03/08/19  18:29    


 


Urine Mucus  Few Strands   03/08/19  18:29    


 


Ur Microscopic Review  INDICATED   03/08/19  18:29    


 


Urine Culture Comments  NOT INDICATED   03/08/19  18:29    


 


Last Dose Date  UNK   03/12/19  09:30    


 


Last Dose Time  UNK   03/12/19  09:30    


 


Vancomycin Trough  17.4 ug/mL (10.0-20.0)   03/12/19  09:30    


 


Urine Opiates Screen  NEGATIVE  (NEGATIVE)   03/04/19  00:00    


 


Ur Oxycodone Screen  NEGATIVE  (NEGATIVE)   03/04/19  00:00    


 


Urine Methadone Screen  NEGATIVE  (NEGATIVE)   03/04/19  00:00    


 


Ur Propoxyphene Screen  NEGATIVE  (NEGATIVE)   03/04/19  00:00    


 


Ur Barbiturates Screen  NEGATIVE  (NEGATIVE)   03/04/19  00:00    


 


Ur Tricyclics Screen  NEGATIVE  (NEGATIVE)   03/04/19  00:00    


 


Ur Phencyclidine Scrn  NEGATIVE  (NEGATIVE)   03/04/19  00:00    


 


Ur Amphetamine Screen  NEGATIVE  (NEGATIVE)   03/04/19  00:00    


 


U Methamphetamines Scrn  NEGATIVE  (NEGATIVE)   03/04/19  00:00    


 


U Benzodiazepines Scrn  NEGATIVE  (NEGATIVE)   03/04/19  00:00    


 


Urine Cocaine Screen  NEGATIVE  (NEGATIVE)   03/04/19  00:00    


 


U Cannabinoids Screen  NEGATIVE  (NEGATIVE)   03/04/19  00:00    


 


C. difficile Tox B Gene  NEGATIVE  (NEGATIVE)   03/10/19  07:43    














Sepsis Event Note (H)





- Evaluation


Current Stage of Sepsis: Ruled out

## 2019-03-16 RX ADMIN — POLYETHYLENE GLYCOL 3350 SCH GM: 17 POWDER, FOR SOLUTION ORAL at 08:42

## 2019-03-16 RX ADMIN — CLINDAMYCIN HYDROCHLORIDE SCH MG: 150 CAPSULE ORAL at 20:21

## 2019-03-16 RX ADMIN — MEMANTINE HYDROCHLORIDE SCH MG: 5 TABLET ORAL at 20:22

## 2019-03-16 RX ADMIN — NICOTINE SCH PATCH: 7 PATCH TRANSDERMAL at 08:58

## 2019-03-16 RX ADMIN — FAMOTIDINE SCH MG: 20 TABLET, FILM COATED ORAL at 20:21

## 2019-03-16 RX ADMIN — ENOXAPARIN SODIUM SCH MG: 100 INJECTION SUBCUTANEOUS at 08:42

## 2019-03-16 RX ADMIN — FAMOTIDINE SCH MG: 20 TABLET, FILM COATED ORAL at 08:42

## 2019-03-16 RX ADMIN — Medication SCH TAB: at 08:41

## 2019-03-16 RX ADMIN — CLINDAMYCIN HYDROCHLORIDE SCH MG: 150 CAPSULE ORAL at 08:41

## 2019-03-16 RX ADMIN — MEMANTINE HYDROCHLORIDE SCH MG: 5 TABLET ORAL at 08:41

## 2019-03-16 RX ADMIN — Medication SCH MG: at 17:00

## 2019-03-16 NOTE — ADVANCE CARE PLANNING NOTE
Advance Care Planning





- Date/Time


Date: 03/15/19


Time: 11:15





- Purpose of encounter


Text: 





To establish his capability to make decisions AND if he is able, then to 

determine his Code wishes and choice of a potential DPOA 








- Parties in attendance


Parties in attendance: 





I spoke to the patient, who was sitting in his recliner, and his listed Contact 

person, Tiffany, also interacting with him.








- Decisional capacity


Decisional capacity of: 





He answered to only a portion of questions and only said Yes, No and had facial 

gestures, he was unable to answer Where he is or state Who Tiffany is or say her 

name. When she asked him "Who am I?", he only shrugged that he did not know. 

(This is a person that he lived with for 35 years).





He therefore has no capability for decision making.








- Subjective/Patient's story


Subjective/Patient's story: 





Tiffany and her , Yessenia, took in Mr Cuello and he lived with them for 35 

years. Yessenia was his best friend. She now helped him in his own apartment, took 

him shopping and for walks. They would have normal conversations and 

interactions, she said, until this fall and being brought to the ER.





His past history includes: He has several PhD, lived in [place], [place], then was a 

 in [place] and a . He never  and has no

children. There is one brother, but Tiffany has no idea where he is. He left 

[place] and moved to the [facility] to help his ailing mother, who has . He

never worked again. 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 

and very smart. She sees his current poor mental capacity and is saddened that 

he has changes so much so quickly.





She knows of no POLST form or any other records assigning POA to anyone.








- Objective/Medical story


Objective/Medical Story: 





He was brought to the ER when miranda saw him on the ground on the street and 

called 911. The patient could not describe what happened. He was noted to be 

demented and needed reorienting. His head CT showed no acute findings, but NPH 

was described. He was in the ER for several days awaiting "placement" by our 

Social Workers. He fell out of his ER bed on his second day there, striking his 

L cheek and eye area was diagnosed with facial and nasal bone fractures and a 

laceration of the scalp by imaging. He also developed a cough and was febrile 

and hypoxic on his 7th day in the ER. He was admitted to full Inpatient status 

for a HCAP and possible aspiration pneumonia. He has needed iv antibiotics, 

nebulizers and pulmonary toilet and has improved. He has only ever communicated 

that he is hungry, has pain, wants to get OOB, and similar things. He has never 

been able to carry on a conversation with anyone while here. He will need 

placement in a nursing home when he is clinically stable, as he cannot return to

his senior living apartment.








- Goals of Care


Goals of care determinations: 





Obtain advice from hospital administrators regarding who will be making 

decisions for him.








- Plan


Plan: 





Discuss his case and circumstances with with hospital clinical administration. 


Continue Full Code status, as there is no POLST requesting the alternative.








- Code Status


Code Status: Attempt Resuscitation





- Time Spent on Advance Care Planning


Time spent on advance care plannin min

## 2019-03-16 NOTE — PROVIDER PROGRESS NOTE
Assessment/Plan





- Problem List


(1) Diarrhea


Assessment/Plan: 


Awaiting C. diff twan result.


Starting po Florastor.








(2) Dementia


Qualifiers: 


   Dementia type: unspecified type   Dementia behavioral disturbance: without 

behavioral disturbance   Qualified Code(s): F03.90 - Unspecified dementia 

without behavioral disturbance   


Assessment/Plan: 


Harjeet was started here.


He answers with facial expressions, nods or shakes his head and 1 word answers.


Social Work was informed yesterday that he cannot indicate who he would choose 

as a DPOA. 


Hospital Administration ([facility]) was informed that he may need legal 

guardianship authorized and will need permanent placement when ready for [facility].








(3) HCAP (healthcare-associated pneumonia)


Assessment/Plan: 


On po Levaquin, a 7 day course is planned.








(4) Aspiration pneumonia


Assessment/Plan: 


On Clindamycin po, a 7 day course is planned.


Mucinex and pulm toilet.








(5) Facial fracture


Qualifiers: 


   Encounter type: subsequent encounter   Facial bone/location: other facial 

bone   Fracture type: closed   Laterality: left 


Assessment/Plan: 


No complaints of pain.


Ecchymoses are slowly resolving.








(6) Tobacco use


Assessment/Plan: 


On a Nicotine patch with taper to off planned.











(7) Frequent falls


Assessment/Plan: 


Likely from NPH.


He will need permanent NH placement.


PT is working with him here, he is cooperative and does follow cues and commeds.








(8) NPH (normal pressure hydrocephalus)


Assessment/Plan: 


See on CT brain and was likely causing ataxia and therefore falls








(9) Hypertrophic toenail


Assessment/Plan: 


Asked for Wound Care consult for recommendations








- Current Meds


Current Meds: 





                               Current Medications











Generic Name Dose Route Start Last Admin





  Trade Name Nalini  PRN Reason Stop Dose Admin


 


Clindamycin HCl  600 mg  03/14/19 21:00  03/16/19 08:41





  Cleocin  PO   600 mg





  BID JAISON   Administration





     





     





     





     


 


Enoxaparin Sodium  40 mg  03/11/19 09:00  03/16/19 08:42





  Lovenox  SUBQ   40 mg





  DAILY JAISON   Administration





     





     





     





     


 


Famotidine  20 mg  03/10/19 21:00  03/16/19 08:42





  Pepcid  PO   20 mg





  BID JAISON   Administration





     





     





     





     


 


Guaifenesin  600 mg  03/12/19 21:00  03/16/19 08:42





  Mucinex  PO   600 mg





  BID JAISON   Administration





     





     





     





     


 


Levofloxacin  750 mg  03/15/19 09:00  03/16/19 08:42





  Levaquin  PO   750 mg





  DAILY JAISON   Administration





     





     





     





     


 


Memantine  5 mg  03/10/19 21:00  03/16/19 08:41





  Namenda  PO   5 mg





  BID JAISON   Administration





     





     





     





     


 


Multivitamins/Minerals  1 tab  03/11/19 16:00  03/16/19 08:41





  Theragran  M  PO   1 tab





  DAILYWM JAISON   Administration





     





     





     





     


 


Nicotine  1 patch  03/15/19 09:00  03/16/19 08:58





  Nicoderm  TOP  03/22/19 01:00  1 patch





  DAILY JAISON   Administration





     





     





     





     


 


Polyethylene Glycol  17 gm  03/11/19 09:00  03/16/19 08:42





  Miralax  PO   17 gm





  DAILY JAISON   Administration





     





     





     





     


 


Trazodone HCl  50 mg  03/11/19 21:44  03/11/19 21:53





  Desyrel  PO   50 mg





  QPM PRN   Administration





  Insomnia   





     





     





     














- Lab Result


Fish Bone Diagrams: 


                                 03/14/19 04:20





                                 03/15/19 05:31





Subjective





- Subjective


Patient Reports: No Complaints


Nursing Reports: Other (cooperates)





Objective


Vital Signs: 





                               Vital Signs - 24 hr











  03/15/19 03/16/19 03/16/19





  16:00 00:00 08:00


 


Temperature 36.7 C 36.7 C 36.5 C


 


Heart Rate [ 66 58 L 70





Brachial]   


 


Respiratory 20 18 16





Rate   


 


Blood Pressure 120/42 L 151/75 H 152/77 H





[Right Brachial   





artery]   


 


O2 Saturation 95 95 94








                                     Oxygen











O2 Source                      Room air














I&O (Last 24 Hrs): 





                          Intake and Output Totals x24h











 03/14/19 03/15/19 03/16/19





 23:59 23:59 23:59


 


Intake Total 1890 1160 880


 


Balance 1890 1160 880











General: Other (Disoriented)


HEENT: Mucous membr. moist/pink, Other (L cheek eccymosis)


Neuro: Non Focal, Other (Disoriented)


Cardiovascular: Regular rate


Respiratory: No respiratory distress


Abdomen: Soft


Extremities: No edema, Other (Venous color changes and mild foot edema, toenails

as described yesterda.)





- Results


Results: 





                               Laboratory Results











WBC  2.8 x10^3/uL (4.8-10.8)  L  03/14/19  04:20    


 


RBC  3.29 10^6/uL (4.70-6.10)  L  03/14/19  04:20    


 


Hgb  11.5 g/dL (14.0-18.0)  L  03/14/19  04:20    


 


Hct  33.4 % (42.0-52.0)  L  03/14/19  04:20    


 


MCV  101.5 fL (80.0-94.0)  H  03/14/19  04:20    


 


MCH  35.0 pg (27.0-31.0)  H  03/14/19  04:20    


 


MCHC  34.5 g/dL (32.0-36.0)   03/14/19  04:20    


 


RDW  15.6 % (12.0-15.0)  H  03/14/19  04:20    


 


Plt Count  113 10^3/uL (130-450)  L  03/14/19  04:20    


 


MPV  9.2 fL (7.4-11.4)   03/14/19  04:20    


 


Neut # (Auto)  1.5 10^3/uL (1.5-6.6)   03/14/19  04:20    


 


Lymph # (Auto)  0.8 10^3/uL (1.5-3.5)  L  03/14/19  04:20    


 


Mono # (Auto)  0.5 10^3/uL (0.0-1.0)   03/14/19  04:20    


 


Eos # (Auto)  0.0 10^3/uL (0.0-0.7)   03/14/19  04:20    


 


Baso # (Auto)  0.0 10^3/uL (0.0-0.1)   03/14/19  04:20    


 


Absolute Nucleated RBC  0.00 x10^3/uL  03/14/19  04:20    


 


Total Counted  100   03/13/19  05:05    


 


Band Neuts % (Manual)  0 % (0-10)  03/13/19  05:05    


 


Reactive Lymphs % (Man)  6 %  03/06/19  08:37    


 


Abnorm Lymph % (Manual)  0 %  03/13/19  05:05    


 


Nucleated RBC %  0.1 /100WBC  03/14/19  04:20    


 


Neutrophils # (Manual)  1.5 10^3/uL (1.5-6.6)   03/13/19  05:05    


 


Lymphocytes # (Manual)  0.6 10^3/uL (1.5-3.5)  L  03/13/19  05:05    


 


Monocytes # (Manual)  0.3 10^3/uL (0.0-1.0)   03/13/19  05:05    


 


Eosinophils # (Manual)  0.0 10^3/uL (0-0.7)   03/13/19  05:05    


 


Basophils # (Manual)  0.0 10^3/uL (0-0.1)   03/13/19  05:05    


 


Differential Comment  MANUAL DIFFERENTIAL   03/06/19  08:37    


 


Manual Slide Review  Indicated   03/14/19  04:20    


 


Platelet Estimate  DECREASED (<130,000)  (NORMAL)   03/14/19  04:20    


 


Platelet Morphology  RARE GIANT PLATELETS  (NORMAL)   03/06/19  08:37    


 


RBC Morph Micro Appear  1+ ANISOCYTOSIS  (NORMAL)   03/14/19  04:20    


 


Sodium  139 mmol/L (135-145)   03/15/19  05:31    


 


Potassium  3.6 mmol/L (3.5-5.0)   03/15/19  05:31    


 


Chloride  105 mmol/L (101-111)   03/15/19  05:31    


 


Carbon Dioxide  26 mmol/L (21-32)   03/15/19  05:31    


 


Anion Gap  8.0  (6-13)   03/15/19  05:31    


 


BUN  15 mg/dL (6-20)   03/15/19  05:31    


 


Creatinine  0.9 mg/dL (0.6-1.2)   03/15/19  05:31    


 


Estimated GFR (MDRD)  82  (>89)  L  03/15/19  05:31    


 


Glucose  93 mg/dL ()   03/15/19  05:31    


 


POC Whole Bld Glucose  70 mg/dL (70 - 100)   03/09/19  11:34    


 


Lactic Acid  1.0 mmol/L (0.5-2.2)   03/10/19  09:15    


 


Calcium  7.9 mg/dL (8.5-10.3)  L  03/15/19  05:31    


 


Phosphorus  1.7 mg/dL (2.5-4.6)  L  03/12/19  06:07    


 


Total Bilirubin  0.6 mg/dL (0.2-1.0)   03/09/19  13:37    


 


AST  30 IU/L (10-42)   03/09/19  13:37    


 


ALT  14 IU/L (10-60)   03/09/19  13:37    


 


Alkaline Phosphatase  98 IU/L ()   03/09/19  13:37    


 


Total Creatine Kinase  122 IU/L ()   03/06/19  08:37    


 


Troponin I  < 0.04 ng/mL (<0.49)   03/09/19  13:37    


 


Total Protein  6.6 g/dL (6.7-8.2)  L  03/09/19  13:37    


 


Albumin  2.7 g/dL (3.2-5.5)  L  03/12/19  06:07    


 


Globulin  3.2 g/dL (2.1-4.2)   03/09/19  13:37    


 


Albumin/Globulin Ratio  1.1  (1.0-2.2)   03/09/19  13:37    


 


Lipase  30 U/L (22-51)   03/09/19  13:37    


 


Urine Color  YELLOW   03/08/19  18:29    


 


Urine Clarity  CLEAR  (CLEAR)   03/08/19  18:29    


 


Urine pH  6.0 PH (5.0-7.5)   03/08/19  18:29    


 


Ur Specific Gravity  1.025  (1.002-1.030)   03/08/19  18:29    


 


Urine Protein  NEGATIVE mg/dL (NEGATIVE)   03/08/19  18:29    


 


Urine Glucose (UA)  NEGATIVE mg/dL (NEGATIVE)   03/08/19  18:29    


 


Urine Ketones  15 mg/dL (NEGATIVE)  H  03/08/19  18:29    


 


Urine Occult Blood  SMALL  (NEGATIVE)  H  03/08/19  18:29    


 


Urine Nitrite  NEGATIVE  (NEGATIVE)   03/08/19  18:29    


 


Urine Bilirubin  NEGATIVE  (NEGATIVE)   03/08/19  18:29    


 


Urine Urobilinogen  0.2 (NORMAL) E.U./dL (NORMAL)   03/08/19  18:29    


 


Ur Leukocyte Esterase  NEGATIVE  (NEGATIVE)   03/08/19  18:29    


 


Urine RBC  6-10 /HPF (0-5)  H  03/08/19  18:29    


 


Urine WBC  0-3 /HPF (0-3)   03/08/19  18:29    


 


Ur Squamous Epith Cells  RARE Squamous  (<= Few)   03/08/19  18:29    


 


Urine Bacteria  Rare /HPF (None Seen)   03/08/19  18:29    


 


Urine Mucus  Few Strands   03/08/19  18:29    


 


Ur Microscopic Review  INDICATED   03/08/19  18:29    


 


Urine Culture Comments  NOT INDICATED   03/08/19  18:29    


 


Last Dose Date  UNK   03/12/19  09:30    


 


Last Dose Time  UNK   03/12/19  09:30    


 


Vancomycin Trough  17.4 ug/mL (10.0-20.0)   03/12/19  09:30    


 


Urine Opiates Screen  NEGATIVE  (NEGATIVE)   03/04/19  00:00    


 


Ur Oxycodone Screen  NEGATIVE  (NEGATIVE)   03/04/19  00:00    


 


Urine Methadone Screen  NEGATIVE  (NEGATIVE)   03/04/19  00:00    


 


Ur Propoxyphene Screen  NEGATIVE  (NEGATIVE)   03/04/19  00:00    


 


Ur Barbiturates Screen  NEGATIVE  (NEGATIVE)   03/04/19  00:00    


 


Ur Tricyclics Screen  NEGATIVE  (NEGATIVE)   03/04/19  00:00    


 


Ur Phencyclidine Scrn  NEGATIVE  (NEGATIVE)   03/04/19  00:00    


 


Ur Amphetamine Screen  NEGATIVE  (NEGATIVE)   03/04/19  00:00    


 


U Methamphetamines Scrn  NEGATIVE  (NEGATIVE)   03/04/19  00:00    


 


U Benzodiazepines Scrn  NEGATIVE  (NEGATIVE)   03/04/19  00:00    


 


Urine Cocaine Screen  NEGATIVE  (NEGATIVE)   03/04/19  00:00    


 


U Cannabinoids Screen  NEGATIVE  (NEGATIVE)   03/04/19  00:00    


 


C. difficile Tox B Gene  NEGATIVE  (NEGATIVE)   03/10/19  07:43    














Sepsis Event Note (H)





- Evaluation


Current Stage of Sepsis: Ruled out

## 2019-03-17 RX ADMIN — FAMOTIDINE SCH MG: 20 TABLET, FILM COATED ORAL at 08:42

## 2019-03-17 RX ADMIN — Medication SCH MG: at 08:44

## 2019-03-17 RX ADMIN — CLINDAMYCIN HYDROCHLORIDE SCH MG: 150 CAPSULE ORAL at 08:41

## 2019-03-17 RX ADMIN — FAMOTIDINE SCH MG: 20 TABLET, FILM COATED ORAL at 20:37

## 2019-03-17 RX ADMIN — MEMANTINE HYDROCHLORIDE SCH MG: 5 TABLET ORAL at 08:42

## 2019-03-17 RX ADMIN — POLYETHYLENE GLYCOL 3350 SCH GM: 17 POWDER, FOR SOLUTION ORAL at 08:43

## 2019-03-17 RX ADMIN — Medication SCH: at 17:38

## 2019-03-17 RX ADMIN — ENOXAPARIN SODIUM SCH: 100 INJECTION SUBCUTANEOUS at 09:02

## 2019-03-17 RX ADMIN — Medication SCH TAB: at 08:42

## 2019-03-17 RX ADMIN — ENOXAPARIN SODIUM SCH MG: 100 INJECTION SUBCUTANEOUS at 08:42

## 2019-03-17 RX ADMIN — MEMANTINE HYDROCHLORIDE SCH MG: 5 TABLET ORAL at 20:36

## 2019-03-17 RX ADMIN — NICOTINE SCH PATCH: 7 PATCH TRANSDERMAL at 08:41

## 2019-03-17 RX ADMIN — CLINDAMYCIN HYDROCHLORIDE SCH MG: 150 CAPSULE ORAL at 20:37

## 2019-03-17 NOTE — PROVIDER PROGRESS NOTE
Assessment/Plan





- Problem List


(1) Diarrhea


Assessment/Plan: 


Resolving diarrhea that started 2 days ago, but yesterday he had a soft stool.


Will cancel the C. diff stool sample order.


Remain on Florastor while on antibiotics.








(2) Dementia


Qualifiers: 


   Dementia type: unspecified type   Dementia behavioral disturbance: without 

behavioral disturbance   Qualified Code(s): F03.90 - Unspecified dementia 

without behavioral disturbance   


Assessment/Plan: 


He is more alert and communicative during mornings, then gets fatigued by the 

mid afternoon.


Continue Namenda.








(3) HCAP (healthcare-associated pneumonia)


Assessment/Plan: 


Today is Day #7 of 7 planned for treatment.








(4) Aspiration pneumonia


Assessment/Plan: 


Today is the final day of antibiotics.


Continue cough meds if needed.








(5) Facial fracture


Qualifiers: 


   Encounter type: subsequent encounter   Facial bone/location: other facial 

bone   Fracture type: closed   Laterality: left 


Assessment/Plan: 


Stable








(6) Tobacco use


Assessment/Plan: 


Nicotine patch with taper to off planned, his last day is upcoming.








(7) Frequent falls


Assessment/Plan: 


Physical Therapy is ongoing here and he has slow noticeable improvement.


With his NPH, he will always have ataxia however.








(8) NPH (normal pressure hydrocephalus)


Assessment/Plan: 


Stable.


PT daily planned.








(9) Hypertrophic toenail


Assessment/Plan: 


Wound Care consult tomorrow planned.








- Current Meds


Current Meds: 





                               Current Medications











Generic Name Dose Route Start Last Admin





  Trade Name Nalini  PRN Reason Stop Dose Admin


 


Clindamycin HCl  600 mg  03/14/19 21:00  03/17/19 08:41





  Cleocin  PO   600 mg





  BID JAISON   Administration





     





     





     





     


 


Enoxaparin Sodium  40 mg  03/11/19 09:00  03/17/19 09:02





  Lovenox  SUBQ   Not Given





  DAILY JAISON   





     





     





     





     


 


Famotidine  20 mg  03/10/19 21:00  03/17/19 08:42





  Pepcid  PO   20 mg





  BID JAISON   Administration





     





     





     





     


 


Guaifenesin  600 mg  03/12/19 21:00  03/17/19 08:42





  Mucinex  PO   600 mg





  BID JAISON   Administration





     





     





     





     


 


Levofloxacin  750 mg  03/15/19 09:00  03/17/19 08:42





  Levaquin  PO   750 mg





  DAILY JAISON   Administration





     





     





     





     


 


Memantine  5 mg  03/10/19 21:00  03/17/19 08:42





  Namenda  PO   5 mg





  BID JAISON   Administration





     





     





     





     


 


Multivitamins/Minerals  1 tab  03/11/19 16:00  03/17/19 08:42





  Theragran  M  PO   1 tab





  DAILYWM JAISON   Administration





     





     





     





     


 


Nicotine  1 patch  03/15/19 09:00  03/17/19 08:41





  Nicoderm  TOP  03/22/19 01:00  1 patch





  DAILY JAISON   Administration





     





     





     





     


 


Polyethylene Glycol  17 gm  03/11/19 09:00  03/17/19 08:43





  Miralax  PO   17 gm





  DAILY JAISON   Administration





     





     





     





     


 


Saccharomyces Boulardii  250 mg  03/16/19 17:00  03/17/19 08:44





  Florastor  PO   250 mg





  BIDWM JAISON   Administration





     





     





     





     


 


Trazodone HCl  50 mg  03/11/19 21:44  03/16/19 20:21





  Desyrel  PO   50 mg





  QPM PRN   Administration





  Insomnia   





     





     





     














- Lab Result


Fish Bone Diagrams: 


                                 03/14/19 04:20





                                 03/15/19 05:31





- Additional Planning


My Orders: 





My Active Orders





03/16/19 17:00


Saccharomyces Boulardii [Florastor]   250 mg PO BIDWM 














Subjective





- Subjective


Patient Reports: Feeling Better, No Complaints





Objective


Vital Signs: 





                               Vital Signs - 24 hr











  03/16/19 03/17/19





  15:25 00:00


 


Temperature 36.2 C L 36.4 C L


 


Heart Rate [ 64 79





Brachial]  


 


Respiratory 20 18





Rate  


 


Blood Pressure 129/71 156/63 H





[Right Brachial  





artery]  


 


O2 Saturation 95 99








                                     Oxygen











O2 Source                      Room air














I&O (Last 24 Hrs): 





                          Intake and Output Totals x24h











 03/15/19 03/16/19 03/17/19





 23:59 23:59 23:59


 


Intake Total 1160 1230 


 


Balance 1160 1230 











General: Alert


HEENT: Other (Minimal ecchymosis of L cheek)


Neck: Supple, No JVD


Neuro: Disoriented


Cardiovascular: Regular rate, No murmurs


Respiratory: No respiratory distress, Breath sounds nml


Abdomen: Soft


Extremities: No edema





- Results


Results: 





                               Laboratory Results











WBC  2.8 x10^3/uL (4.8-10.8)  L  03/14/19  04:20    


 


RBC  3.29 10^6/uL (4.70-6.10)  L  03/14/19  04:20    


 


Hgb  11.5 g/dL (14.0-18.0)  L  03/14/19  04:20    


 


Hct  33.4 % (42.0-52.0)  L  03/14/19  04:20    


 


MCV  101.5 fL (80.0-94.0)  H  03/14/19  04:20    


 


MCH  35.0 pg (27.0-31.0)  H  03/14/19  04:20    


 


MCHC  34.5 g/dL (32.0-36.0)   03/14/19  04:20    


 


RDW  15.6 % (12.0-15.0)  H  03/14/19  04:20    


 


Plt Count  113 10^3/uL (130-450)  L  03/14/19  04:20    


 


MPV  9.2 fL (7.4-11.4)   03/14/19  04:20    


 


Neut # (Auto)  1.5 10^3/uL (1.5-6.6)   03/14/19  04:20    


 


Lymph # (Auto)  0.8 10^3/uL (1.5-3.5)  L  03/14/19  04:20    


 


Mono # (Auto)  0.5 10^3/uL (0.0-1.0)   03/14/19  04:20    


 


Eos # (Auto)  0.0 10^3/uL (0.0-0.7)   03/14/19  04:20    


 


Baso # (Auto)  0.0 10^3/uL (0.0-0.1)   03/14/19  04:20    


 


Absolute Nucleated RBC  0.00 x10^3/uL  03/14/19  04:20    


 


Total Counted  100   03/13/19  05:05    


 


Band Neuts % (Manual)  0 % (0-10)  03/13/19  05:05    


 


Reactive Lymphs % (Man)  6 %  03/06/19  08:37    


 


Abnorm Lymph % (Manual)  0 %  03/13/19  05:05    


 


Nucleated RBC %  0.1 /100WBC  03/14/19  04:20    


 


Neutrophils # (Manual)  1.5 10^3/uL (1.5-6.6)   03/13/19  05:05    


 


Lymphocytes # (Manual)  0.6 10^3/uL (1.5-3.5)  L  03/13/19  05:05    


 


Monocytes # (Manual)  0.3 10^3/uL (0.0-1.0)   03/13/19  05:05    


 


Eosinophils # (Manual)  0.0 10^3/uL (0-0.7)   03/13/19  05:05    


 


Basophils # (Manual)  0.0 10^3/uL (0-0.1)   03/13/19  05:05    


 


Differential Comment  MANUAL DIFFERENTIAL   03/06/19  08:37    


 


Manual Slide Review  Indicated   03/14/19  04:20    


 


Platelet Estimate  DECREASED (<130,000)  (NORMAL)   03/14/19  04:20    


 


Platelet Morphology  RARE GIANT PLATELETS  (NORMAL)   03/06/19  08:37    


 


RBC Morph Micro Appear  1+ ANISOCYTOSIS  (NORMAL)   03/14/19  04:20    


 


Sodium  139 mmol/L (135-145)   03/15/19  05:31    


 


Potassium  3.6 mmol/L (3.5-5.0)   03/15/19  05:31    


 


Chloride  105 mmol/L (101-111)   03/15/19  05:31    


 


Carbon Dioxide  26 mmol/L (21-32)   03/15/19  05:31    


 


Anion Gap  8.0  (6-13)   03/15/19  05:31    


 


BUN  15 mg/dL (6-20)   03/15/19  05:31    


 


Creatinine  0.9 mg/dL (0.6-1.2)   03/15/19  05:31    


 


Estimated GFR (MDRD)  82  (>89)  L  03/15/19  05:31    


 


Glucose  93 mg/dL ()   03/15/19  05:31    


 


POC Whole Bld Glucose  70 mg/dL (70 - 100)   03/09/19  11:34    


 


Lactic Acid  1.0 mmol/L (0.5-2.2)   03/10/19  09:15    


 


Calcium  7.9 mg/dL (8.5-10.3)  L  03/15/19  05:31    


 


Phosphorus  1.7 mg/dL (2.5-4.6)  L  03/12/19  06:07    


 


Total Bilirubin  0.6 mg/dL (0.2-1.0)   03/09/19  13:37    


 


AST  30 IU/L (10-42)   03/09/19  13:37    


 


ALT  14 IU/L (10-60)   03/09/19  13:37    


 


Alkaline Phosphatase  98 IU/L ()   03/09/19  13:37    


 


Total Creatine Kinase  122 IU/L ()   03/06/19  08:37    


 


Troponin I  < 0.04 ng/mL (<0.49)   03/09/19  13:37    


 


Total Protein  6.6 g/dL (6.7-8.2)  L  03/09/19  13:37    


 


Albumin  2.7 g/dL (3.2-5.5)  L  03/12/19  06:07    


 


Globulin  3.2 g/dL (2.1-4.2)   03/09/19  13:37    


 


Albumin/Globulin Ratio  1.1  (1.0-2.2)   03/09/19  13:37    


 


Lipase  30 U/L (22-51)   03/09/19  13:37    


 


Urine Color  YELLOW   03/08/19  18:29    


 


Urine Clarity  CLEAR  (CLEAR)   03/08/19  18:29    


 


Urine pH  6.0 PH (5.0-7.5)   03/08/19  18:29    


 


Ur Specific Gravity  1.025  (1.002-1.030)   03/08/19  18:29    


 


Urine Protein  NEGATIVE mg/dL (NEGATIVE)   03/08/19  18:29    


 


Urine Glucose (UA)  NEGATIVE mg/dL (NEGATIVE)   03/08/19  18:29    


 


Urine Ketones  15 mg/dL (NEGATIVE)  H  03/08/19  18:29    


 


Urine Occult Blood  SMALL  (NEGATIVE)  H  03/08/19  18:29    


 


Urine Nitrite  NEGATIVE  (NEGATIVE)   03/08/19  18:29    


 


Urine Bilirubin  NEGATIVE  (NEGATIVE)   03/08/19  18:29    


 


Urine Urobilinogen  0.2 (NORMAL) E.U./dL (NORMAL)   03/08/19  18:29    


 


Ur Leukocyte Esterase  NEGATIVE  (NEGATIVE)   03/08/19  18:29    


 


Urine RBC  6-10 /HPF (0-5)  H  03/08/19  18:29    


 


Urine WBC  0-3 /HPF (0-3)   03/08/19  18:29    


 


Ur Squamous Epith Cells  RARE Squamous  (<= Few)   03/08/19  18:29    


 


Urine Bacteria  Rare /HPF (None Seen)   03/08/19  18:29    


 


Urine Mucus  Few Strands   03/08/19  18:29    


 


Ur Microscopic Review  INDICATED   03/08/19  18:29    


 


Urine Culture Comments  NOT INDICATED   03/08/19  18:29    


 


Last Dose Date  UNK   03/12/19  09:30    


 


Last Dose Time  UNK   03/12/19  09:30    


 


Vancomycin Trough  17.4 ug/mL (10.0-20.0)   03/12/19  09:30    


 


Urine Opiates Screen  NEGATIVE  (NEGATIVE)   03/04/19  00:00    


 


Ur Oxycodone Screen  NEGATIVE  (NEGATIVE)   03/04/19  00:00    


 


Urine Methadone Screen  NEGATIVE  (NEGATIVE)   03/04/19  00:00    


 


Ur Propoxyphene Screen  NEGATIVE  (NEGATIVE)   03/04/19  00:00    


 


Ur Barbiturates Screen  NEGATIVE  (NEGATIVE)   03/04/19  00:00    


 


Ur Tricyclics Screen  NEGATIVE  (NEGATIVE)   03/04/19  00:00    


 


Ur Phencyclidine Scrn  NEGATIVE  (NEGATIVE)   03/04/19  00:00    


 


Ur Amphetamine Screen  NEGATIVE  (NEGATIVE)   03/04/19  00:00    


 


U Methamphetamines Scrn  NEGATIVE  (NEGATIVE)   03/04/19  00:00    


 


U Benzodiazepines Scrn  NEGATIVE  (NEGATIVE)   03/04/19  00:00    


 


Urine Cocaine Screen  NEGATIVE  (NEGATIVE)   03/04/19  00:00    


 


U Cannabinoids Screen  NEGATIVE  (NEGATIVE)   03/04/19  00:00    


 


C. difficile Tox B Gene  NEGATIVE  (NEGATIVE)   03/10/19  07:43    














Sepsis Event Note (H)





- Evaluation


Current Stage of Sepsis: Ruled out

## 2019-03-18 RX ADMIN — CLINDAMYCIN HYDROCHLORIDE SCH MG: 150 CAPSULE ORAL at 09:25

## 2019-03-18 RX ADMIN — NICOTINE SCH PATCH: 7 PATCH TRANSDERMAL at 09:25

## 2019-03-18 RX ADMIN — FAMOTIDINE SCH MG: 20 TABLET, FILM COATED ORAL at 20:28

## 2019-03-18 RX ADMIN — MEMANTINE HYDROCHLORIDE SCH MG: 5 TABLET ORAL at 09:25

## 2019-03-18 RX ADMIN — FAMOTIDINE SCH MG: 20 TABLET, FILM COATED ORAL at 09:25

## 2019-03-18 RX ADMIN — MEMANTINE HYDROCHLORIDE SCH MG: 5 TABLET ORAL at 20:28

## 2019-03-18 RX ADMIN — Medication SCH TAB: at 09:25

## 2019-03-18 RX ADMIN — POLYETHYLENE GLYCOL 3350 SCH: 17 POWDER, FOR SOLUTION ORAL at 09:26

## 2019-03-18 RX ADMIN — Medication SCH MG: at 09:25

## 2019-03-18 RX ADMIN — ENOXAPARIN SODIUM SCH MG: 100 INJECTION SUBCUTANEOUS at 09:26

## 2019-03-18 NOTE — PROVIDER PROGRESS NOTE
Assessment/Plan





- Problem List


(1) HCAP (healthcare-associated pneumonia)


Assessment/Plan: 


He has finished a 7 day course of treatment and is clinically more comfortable, 

from a respiratory standpoint.


Done with Levaquin.


Will stop Florastor as well.


Continue Mucinex prn cough.








(2) Aspiration pneumonia


Assessment/Plan: 


He has finished a 7 day course of treatment and is clinically more comfortable, 

from a respiratory standpoint.


Done with Clindamycin po.


Will stop Florastor as well.


Continue Mucinex prn cough.








(3) Dementia


Qualifiers: 


   Dementia type: unspecified type   Dementia behavioral disturbance: without 

behavioral disturbance   Qualified Code(s): F03.90 - Unspecified dementia 

without behavioral disturbance   


Assessment/Plan: 


Continue clueing and reorientation.


Continue Namenda, which was started at admission.


He has no family (except 1 brother who cannot be located), so no legal DPOA.


He requires placement, can no longer function independantly in a senior 

apartment.


Social Workers are working on placement for him. 








(4) Facial fracture


Qualifiers: 


   Encounter type: subsequent encounter   Facial bone/location: other facial 

bone   Fracture type: closed   Laterality: left 


Assessment/Plan: 


Stable








(5) Tobacco use


Assessment/Plan: 


Nearly off the Nicotine patch-mediated taper.








(6) Frequent falls


Assessment/Plan: 


His NPH likely led to these.


PT states he is still a fall risk.








(7) NPH (normal pressure hydrocephalus)


Assessment/Plan: 


Abnormal but stable, unchanged symptoms.











(9) Diarrhea


Assessment/Plan: 


Resolved








- Current Meds


Current Meds: 





                               Current Medications











Generic Name Dose Route Start Last Admin





  Trade Name Freq  PRN Reason Stop Dose Admin


 


Clindamycin HCl  600 mg  03/14/19 21:00  03/18/19 09:25





  Cleocin  PO   600 mg





  BID JAISON   Administration





     





     





     





     


 


Enoxaparin Sodium  40 mg  03/11/19 09:00  03/18/19 09:26





  Lovenox  SUBQ   40 mg





  DAILY [facility]   Administration





     





     





     





     


 


Famotidine  20 mg  03/10/19 21:00  03/18/19 09:25





  Pepcid  PO   20 mg





  BID [facility]   Administration





     





     





     





     


 


Guaifenesin  600 mg  03/12/19 21:00  03/18/19 09:25





  Mucinex  PO   600 mg





  BID JAISON   Administration





     





     





     





     


 


Levofloxacin  750 mg  03/15/19 09:00  03/18/19 09:25





  Levaquin  PO   750 mg





  DAILY JAISON   Administration





     





     





     





     


 


Memantine  5 mg  03/10/19 21:00  03/18/19 09:25





  Namenda  PO   5 mg





  BID JAISON   Administration





     





     





     





     


 


Multivitamins/Minerals  1 tab  03/11/19 16:00  03/18/19 09:25





  Theragran  M  PO   1 tab





  DAILYWM JAISON   Administration





     





     





     





     


 


Nicotine  1 patch  03/15/19 09:00  03/18/19 09:25





  Nicoderm  TOP  03/22/19 01:00  1 patch





  DAILY JAISON   Administration





     





     





     





     


 


Polyethylene Glycol  17 gm  03/11/19 09:00  03/18/19 09:26





  Miralax  PO   Not Given





  DAILY JAISON   





     





     





     





     


 


Saccharomyces Boulardii  250 mg  03/16/19 17:00  03/18/19 09:25





  Florastor  PO   250 mg





  BIDWM JAISON   Administration





     





     





     





     


 


Trazodone HCl  50 mg  03/11/19 21:44  03/16/19 20:21





  Desyrel  PO   50 mg





  QPM PRN   Administration





  Insomnia   





     





     





     














- Lab Result


Fish Bone Diagrams: 


                                 03/14/19 04:20





                                 03/15/19 05:31





- Additional Planning


My Orders: 





My Active Orders





03/18/19


Wound Consult MAC [MAC] Routine 





03/18/19 Lunch


Dysphagia Puree Diet [DIET] 














Subjective





- Subjective


Patient Reports: No Complaints, Other (I have heard him state "Oh, God damn it" 

when he is in pain from his maxillary and nasal bone fractures, when he sneezes.

 Then he relaxes.)





Objective


Vital Signs: 





                               Vital Signs - 24 hr











  03/17/19 03/18/19 03/18/19





  16:00 00:05 08:00


 


Temperature 36.4 C L 36.7 C 36.8 C


 


Heart Rate [ 69 76 83





Brachial]   


 


Respiratory 18 16 18





Rate   


 


Blood Pressure 114/71 127/75 140/70 H





[Right Brachial   





artery]   


 


O2 Saturation 92 99 94








                                     Oxygen











O2 Source                      Room air














I&O (Last 24 Hrs): 





                          Intake and Output Totals x24h











 03/16/19 03/17/19 03/18/19





 23:59 23:59 23:59


 


Intake Total 1230 890 360


 


Balance 1230 890 360











General: Alert


HEENT: Mucous membr. moist/pink


Neck: Supple


Neuro: Disoriented


Cardiovascular: No murmurs


Respiratory: No respiratory distress, Breath sounds nml


Abdomen: Soft


Extremities: No edema, Other (Hypertrophic toe nails)





- Results


Results: 





                               Laboratory Results











WBC  2.8 x10^3/uL (4.8-10.8)  L  03/14/19  04:20    


 


RBC  3.29 10^6/uL (4.70-6.10)  L  03/14/19  04:20    


 


Hgb  11.5 g/dL (14.0-18.0)  L  03/14/19  04:20    


 


Hct  33.4 % (42.0-52.0)  L  03/14/19  04:20    


 


MCV  101.5 fL (80.0-94.0)  H  03/14/19  04:20    


 


MCH  35.0 pg (27.0-31.0)  H  03/14/19  04:20    


 


MCHC  34.5 g/dL (32.0-36.0)   03/14/19  04:20    


 


RDW  15.6 % (12.0-15.0)  H  03/14/19  04:20    


 


Plt Count  113 10^3/uL (130-450)  L  03/14/19  04:20    


 


MPV  9.2 fL (7.4-11.4)   03/14/19  04:20    


 


Neut # (Auto)  1.5 10^3/uL (1.5-6.6)   03/14/19  04:20    


 


Lymph # (Auto)  0.8 10^3/uL (1.5-3.5)  L  03/14/19  04:20    


 


Mono # (Auto)  0.5 10^3/uL (0.0-1.0)   03/14/19  04:20    


 


Eos # (Auto)  0.0 10^3/uL (0.0-0.7)   03/14/19  04:20    


 


Baso # (Auto)  0.0 10^3/uL (0.0-0.1)   03/14/19  04:20    


 


Absolute Nucleated RBC  0.00 x10^3/uL  03/14/19  04:20    


 


Total Counted  100   03/13/19  05:05    


 


Band Neuts % (Manual)  0 % (0-10)  03/13/19  05:05    


 


Reactive Lymphs % (Man)  6 %  03/06/19  08:37    


 


Abnorm Lymph % (Manual)  0 %  03/13/19  05:05    


 


Nucleated RBC %  0.1 /100WBC  03/14/19  04:20    


 


Neutrophils # (Manual)  1.5 10^3/uL (1.5-6.6)   03/13/19  05:05    


 


Lymphocytes # (Manual)  0.6 10^3/uL (1.5-3.5)  L  03/13/19  05:05    


 


Monocytes # (Manual)  0.3 10^3/uL (0.0-1.0)   03/13/19  05:05    


 


Eosinophils # (Manual)  0.0 10^3/uL (0-0.7)   03/13/19  05:05    


 


Basophils # (Manual)  0.0 10^3/uL (0-0.1)   03/13/19  05:05    


 


Differential Comment  MANUAL DIFFERENTIAL   03/06/19  08:37    


 


Manual Slide Review  Indicated   03/14/19  04:20    


 


Platelet Estimate  DECREASED (<130,000)  (NORMAL)   03/14/19  04:20    


 


Platelet Morphology  RARE GIANT PLATELETS  (NORMAL)   03/06/19  08:37    


 


RBC Morph Micro Appear  1+ ANISOCYTOSIS  (NORMAL)   03/14/19  04:20    


 


Sodium  139 mmol/L (135-145)   03/15/19  05:31    


 


Potassium  3.6 mmol/L (3.5-5.0)   03/15/19  05:31    


 


Chloride  105 mmol/L (101-111)   03/15/19  05:31    


 


Carbon Dioxide  26 mmol/L (21-32)   03/15/19  05:31    


 


Anion Gap  8.0  (6-13)   03/15/19  05:31    


 


BUN  15 mg/dL (6-20)   03/15/19  05:31    


 


Creatinine  0.9 mg/dL (0.6-1.2)   03/15/19  05:31    


 


Estimated GFR (MDRD)  82  (>89)  L  03/15/19  05:31    


 


Glucose  93 mg/dL ()   03/15/19  05:31    


 


POC Whole Bld Glucose  70 mg/dL (70 - 100)   03/09/19  11:34    


 


Lactic Acid  1.0 mmol/L (0.5-2.2)   03/10/19  09:15    


 


Calcium  7.9 mg/dL (8.5-10.3)  L  03/15/19  05:31    


 


Phosphorus  1.7 mg/dL (2.5-4.6)  L  03/12/19  06:07    


 


Total Bilirubin  0.6 mg/dL (0.2-1.0)   03/09/19  13:37    


 


AST  30 IU/L (10-42)   03/09/19  13:37    


 


ALT  14 IU/L (10-60)   03/09/19  13:37    


 


Alkaline Phosphatase  98 IU/L ()   03/09/19  13:37    


 


Total Creatine Kinase  122 IU/L ()   03/06/19  08:37    


 


Troponin I  < 0.04 ng/mL (<0.49)   03/09/19  13:37    


 


Total Protein  6.6 g/dL (6.7-8.2)  L  03/09/19  13:37    


 


Albumin  2.7 g/dL (3.2-5.5)  L  03/12/19  06:07    


 


Globulin  3.2 g/dL (2.1-4.2)   03/09/19  13:37    


 


Albumin/Globulin Ratio  1.1  (1.0-2.2)   03/09/19  13:37    


 


Lipase  30 U/L (22-51)   03/09/19  13:37    


 


Urine Color  YELLOW   03/08/19  18:29    


 


Urine Clarity  CLEAR  (CLEAR)   03/08/19  18:29    


 


Urine pH  6.0 PH (5.0-7.5)   03/08/19  18:29    


 


Ur Specific Gravity  1.025  (1.002-1.030)   03/08/19  18:29    


 


Urine Protein  NEGATIVE mg/dL (NEGATIVE)   03/08/19  18:29    


 


Urine Glucose (UA)  NEGATIVE mg/dL (NEGATIVE)   03/08/19  18:29    


 


Urine Ketones  15 mg/dL (NEGATIVE)  H  03/08/19  18:29    


 


Urine Occult Blood  SMALL  (NEGATIVE)  H  03/08/19  18:29    


 


Urine Nitrite  NEGATIVE  (NEGATIVE)   03/08/19  18:29    


 


Urine Bilirubin  NEGATIVE  (NEGATIVE)   03/08/19  18:29    


 


Urine Urobilinogen  0.2 (NORMAL) E.U./dL (NORMAL)   03/08/19  18:29    


 


Ur Leukocyte Esterase  NEGATIVE  (NEGATIVE)   03/08/19  18:29    


 


Urine RBC  6-10 /HPF (0-5)  H  03/08/19  18:29    


 


Urine WBC  0-3 /HPF (0-3)   03/08/19  18:29    


 


Ur Squamous Epith Cells  RARE Squamous  (<= Few)   03/08/19  18:29    


 


Urine Bacteria  Rare /HPF (None Seen)   03/08/19  18:29    


 


Urine Mucus  Few Strands   03/08/19  18:29    


 


Ur Microscopic Review  INDICATED   03/08/19  18:29    


 


Urine Culture Comments  NOT INDICATED   03/08/19  18:29    


 


Last Dose Date  UNK   03/12/19  09:30    


 


Last Dose Time  UNK   03/12/19  09:30    


 


Vancomycin Trough  17.4 ug/mL (10.0-20.0)   03/12/19  09:30    


 


Urine Opiates Screen  NEGATIVE  (NEGATIVE)   03/04/19  00:00    


 


Ur Oxycodone Screen  NEGATIVE  (NEGATIVE)   03/04/19  00:00    


 


Urine Methadone Screen  NEGATIVE  (NEGATIVE)   03/04/19  00:00    


 


Ur Propoxyphene Screen  NEGATIVE  (NEGATIVE)   03/04/19  00:00    


 


Ur Barbiturates Screen  NEGATIVE  (NEGATIVE)   03/04/19  00:00    


 


Ur Tricyclics Screen  NEGATIVE  (NEGATIVE)   03/04/19  00:00    


 


Ur Phencyclidine Scrn  NEGATIVE  (NEGATIVE)   03/04/19  00:00    


 


Ur Amphetamine Screen  NEGATIVE  (NEGATIVE)   03/04/19  00:00    


 


U Methamphetamines Scrn  NEGATIVE  (NEGATIVE)   03/04/19  00:00    


 


U Benzodiazepines Scrn  NEGATIVE  (NEGATIVE)   03/04/19  00:00    


 


Urine Cocaine Screen  NEGATIVE  (NEGATIVE)   03/04/19  00:00    


 


U Cannabinoids Screen  NEGATIVE  (NEGATIVE)   03/04/19  00:00    


 


C. difficile Tox B Gene  NEGATIVE  (NEGATIVE)   03/10/19  07:43    














Sepsis Event Note (H)





- Evaluation


Current Stage of Sepsis: Ruled out

## 2019-03-19 RX ADMIN — NICOTINE SCH PATCH: 7 PATCH TRANSDERMAL at 09:42

## 2019-03-19 RX ADMIN — ENOXAPARIN SODIUM SCH MG: 100 INJECTION SUBCUTANEOUS at 09:41

## 2019-03-19 RX ADMIN — FAMOTIDINE SCH MG: 20 TABLET, FILM COATED ORAL at 20:21

## 2019-03-19 RX ADMIN — POLYETHYLENE GLYCOL 3350 SCH GM: 17 POWDER, FOR SOLUTION ORAL at 09:42

## 2019-03-19 RX ADMIN — FAMOTIDINE SCH MG: 20 TABLET, FILM COATED ORAL at 09:41

## 2019-03-19 RX ADMIN — MEMANTINE HYDROCHLORIDE SCH MG: 5 TABLET ORAL at 09:41

## 2019-03-19 RX ADMIN — MEMANTINE HYDROCHLORIDE SCH MG: 5 TABLET ORAL at 20:21

## 2019-03-19 RX ADMIN — Medication SCH TAB: at 09:41

## 2019-03-19 NOTE — PROVIDER PROGRESS NOTE
Subjective





- Prog Note Date


Prog Note Date: 03/19/19


Prog Note Time: 13:05





- Subjective


Subjective: 


He is an elderly gentleman who has Alzheimer's dementia with behavioral 

disturbances.  He lives alone at home and had frequent falls and lives on TV 

dinners.  He was brought into the emergency room because of behavioral disorder.

 Did not have any criteria for admission.  He was in the emergency room for a 

week where he suffered a fall, and had a fracture of his face.  In addition he 

aspirated and had decreasing level of consciousness and a change in his baseline

mental status.  He was found to have a right lower lobe pneumonia on chest x-ray

with hypoxia.  He has now been here 10 days since March 10 in acute care.





On presentation he had copious green mucus pooling in his oropharynx.  He had 

expiratory rhonchi, fine bibasilar rales, a left nasal bone fracture of the 

superior lateral walls with left maxillary left facial and left periorbital in

volvement.  He had a lot of soft tissue swelling.





Since that time is been treated for pneumonia.  He has had an episode of 

diarrhea which is resolved.  He is still with dementia and behavioral disorders.

 He is not violent, not combative, he can be prompted to eat, and cooperate.  

But he likes to yell a lot.





I am now the hospitalist on service for this week.  Social work is working on 

placement.








Current Medications





- Current Medications


Current Medications: 





Active Medications





Acetaminophen (Tylenol)  650 mg PO Q4HR PRN


   PRN Reason: Pain 1 to 4


Enoxaparin Sodium (Lovenox)  40 mg SUBQ DAILY [facility]


   Last Admin: 03/19/19 09:41 Dose:  40 mg


Famotidine (Pepcid)  20 mg PO BID [facility]


   Last Admin: 03/19/19 09:41 Dose:  20 mg


Guaifenesin (Mucinex)  600 mg PO BID [facility]


   Last Admin: 03/19/19 09:41 Dose:  600 mg


Memantine (Namenda)  5 mg PO BID [facility]


   Last Admin: 03/19/19 09:41 Dose:  5 mg


Multivitamins/Minerals (Theragran  M)  1 tab PO DAILYWM [facility]


   Last Admin: 03/19/19 09:41 Dose:  1 tab


Nicotine (Nicoderm)  1 patch TOP DAILY [facility]


   Stop: 03/22/19 01:00


   Last Admin: 03/19/19 09:42 Dose:  1 patch


Polyethylene Glycol (Miralax)  17 gm PO DAILY [facility]


   Last Admin: 03/19/19 09:42 Dose:  17 gm


Trazodone HCl (Desyrel)  50 mg PO QPM PRN


   PRN Reason: Insomnia


   Last Admin: 03/18/19 20:28 Dose:  50 mg





                                        





No Known Home Medications  06/18/18 











Objective





- Vital Signs/Intake & Output


Reviewed Vital Signs: Yes


Vital Signs: 


                                Vital Signs x48h











  Temp Pulse Resp BP Pulse Ox


 


 03/19/19 08:00  36.7 C  67  20  144/79 H  94











Intake & Output: 


                                 Intake & Output











 03/16/19 03/17/19 03/18/19 03/19/19





 23:59 23:59 23:59 23:59


 


Intake Total 1230 890 750 240


 


Balance 1230 890 750 240














- Objective


General Appearance: positive: No acute distress, Alert, Other (Thin white male 

who looks stated age, angular, gaunt, full beard and glasses)


Eyes Bilateral: positive: PERRL, EOMI


ENT: positive: Other (poor dentition, soft tissue edema present on admission not

present, no black eyes)


Neck: positive: No JVD.  negative: Stiff neck, Carotid bruit


Respiratory: positive: Chest non-tender.  negative: Wheezes, Rales, Rhonchi


Cardiovascular: positive: Regular rate & rhythm, No gallop.  negative: JVD 

present, Gallop/S4, Friction rub


Abdomen: positive: Non-tender, No organomegaly, Nml bowel sounds, No distention


Skin: positive: Warm, Dry


Neurologic/Psychiatric: positive: CN's nml (2-12), Motor nml, Disoriented to 

person, Other





- Lab Results


Fish Bones: 


                                 03/14/19 04:20





                                 03/15/19 05:31





ABX Reporting


Has patient been on IV antibiotics over the past 48 hours?: No





Sepsis Event Note (H)





- Evaluation


Current Stage of Sepsis: Ruled out





Assessment/Plan





- Problem List


(1) Hypertrophic toenail


Impression: 


These make it harder to walk.


He missed his Podiatry appointment when he was boarding in the ER.


Wound Care supposedly saw patient 3/18 but no note available as of yet. I will 

ask them.





(2) HCAP (healthcare-associated pneumonia)


Assessment/Plan: 


He has finished a 7 day course of treatment and is clinically more comfortable, 

from a respiratory standpoint.


Done with Levaquin.


Will stop Florastor as well.


Continue Mucinex prn cough.





(3) Aspiration pneumonia


Assessment/Plan: 


He has finished a 7 day course of treatment and is clinically more comfortable, 

from a respiratory standpoint.


Done with Clindamycin po.


Will stop Florastor as well.


Continue Mucinex prn cough.





(4) Dementia


Qualifiers: 


   Dementia type: unspecified type   Dementia behavioral disturbance: without 

behavioral disturbance   Qualified Code(s): F03.90 - Unspecified dementia 

without behavioral disturbance   


Assessment/Plan: 


Continue clueing and reorientation. While he is loud and can get upset, can be 

calmed but low, slow voice and repetitive prompting. 


Continue Namenda, which was started at admission.


He has no family (except 1 brother who cannot be located), so no legal DPOA.


He requires placement, can no longer function independantly in a senior 

apartment.


Social Workers are working on placement for him. 





(5) Facial fracture from fall


Qualifiers: 


   Encounter type: subsequent encounter   Facial bone/location: other facial 

bone   Fracture type: closed   Laterality: left 


Assessment/Plan: 


Stable





(6) Tobacco use


Assessment/Plan: 


Nearly off the Nicotine patch-mediated taper.





(7) Frequent falls


Assessment/Plan: 


His NPH likely led to these.


PT states he is still a fall risk.





(8) NPH (normal pressure hydrocephalus)


Assessment/Plan: 


with associated cerebellar ataxia, falls. 


Abnormal but stable, unchanged symptoms.


Not a candidate for shunt since his disease is too far gone.








(9) Diarrhea


Assessment/Plan: 


Resolved

## 2019-03-20 RX ADMIN — FAMOTIDINE SCH MG: 20 TABLET, FILM COATED ORAL at 21:34

## 2019-03-20 RX ADMIN — FAMOTIDINE SCH MG: 20 TABLET, FILM COATED ORAL at 11:04

## 2019-03-20 RX ADMIN — MEMANTINE HYDROCHLORIDE SCH MG: 5 TABLET ORAL at 11:04

## 2019-03-20 RX ADMIN — ENOXAPARIN SODIUM SCH MG: 100 INJECTION SUBCUTANEOUS at 11:03

## 2019-03-20 RX ADMIN — NICOTINE SCH PATCH: 7 PATCH TRANSDERMAL at 11:04

## 2019-03-20 RX ADMIN — MEMANTINE HYDROCHLORIDE SCH MG: 5 TABLET ORAL at 21:34

## 2019-03-20 RX ADMIN — POLYETHYLENE GLYCOL 3350 SCH: 17 POWDER, FOR SOLUTION ORAL at 11:04

## 2019-03-20 RX ADMIN — Medication SCH TAB: at 11:04

## 2019-03-20 NOTE — PROVIDER PROGRESS NOTE
Subjective





- Prog Note Date


Prog Note Date: 03/20/19


Prog Note Time: 11:59





- Subjective


Pt reports feeling: No change


Subjective: 





no new complaints. awake, alert, watching Tv and eating breakfast.





Current Medications





- Current Medications


Current Medications: 





Active Medications





Acetaminophen (Tylenol)  650 mg PO Q4HR PRN


   PRN Reason: Pain 1 to 4


Enoxaparin Sodium (Lovenox)  40 mg SUBQ DAILY [facility]


   Last Admin: 03/20/19 11:03 Dose:  40 mg


Famotidine (Pepcid)  20 mg PO BID [facility]


   Last Admin: 03/20/19 11:04 Dose:  20 mg


Guaifenesin (Mucinex)  600 mg PO BID [facility]


   Last Admin: 03/20/19 11:04 Dose:  600 mg


Memantine (Namenda)  5 mg PO BID [facility]


   Last Admin: 03/20/19 11:04 Dose:  5 mg


Multivitamins/Minerals (Theragran  M)  1 tab PO DAILYWM [facility]


   Last Admin: 03/20/19 11:04 Dose:  1 tab


Nicotine (Nicoderm)  1 patch TOP DAILY [facility]


   Stop: 03/22/19 01:00


   Last Admin: 03/20/19 11:04 Dose:  1 patch


Polyethylene Glycol (Miralax)  17 gm PO DAILY [facility]


   Last Admin: 03/20/19 11:04 Dose:  Not Given


Trazodone HCl (Desyrel)  50 mg PO QPM PRN


   PRN Reason: Insomnia


   Last Admin: 03/19/19 20:21 Dose:  50 mg





                                        





No Known Home Medications  06/18/18 











Objective





- Vital Signs/Intake & Output


Reviewed Vital Signs: Yes


Vital Signs: 


                                Vital Signs x48h











  Temp Pulse Resp BP Pulse Ox


 


 03/20/19 11:10  36.7 C  84  20  133/67 H  93











Intake & Output: 


                                 Intake & Output











 03/17/19 03/18/19 03/19/19 03/20/19





 23:59 23:59 23:59 23:59


 


Intake Total 890 750 600 


 


Balance 890 750 600 














- Objective


General Appearance: positive: No acute distress, Alert, Other (thin beared, 

white male)


Eyes Bilateral: positive: PERRL


ENT: positive: Pharynx nml


Neck: positive: No JVD.  negative: Carotid bruit, Swelling/bruising


Respiratory: positive: Chest non-tender.  negative: Wheezes, Rales, Rhonchi


Cardiovascular: positive: Regular rate & rhythm.  negative: Gallop/S4, Friction 

rub


Abdomen: positive: Non-tender, No distention.  negative: Guarding, Rebound


Skin: positive: Warm, Dry, Pallor


Extremities: positive: Non-tender, No pedal edema


Neurologic/Psychiatric: positive: CN's nml (2-12), Motor nml





- Lab Results


Fish Bones: 


                                 03/14/19 04:20





                                 03/15/19 05:31





ABX Reporting


Has patient been on IV antibiotics over the past 48 hours?: No





Sepsis Event Note (H)





- Evaluation


Current Stage of Sepsis: Ruled out





Assessment/Plan





- Problem List


(1) Hypertrophic toenail


Impression: 


These make it harder to walk.


He missed his Podiatry appointment when he was boarding in the ER.


Wound Care supposedly saw patient 3/18 but no note available as of yet. I will 

ask them.


The provider saw the patient and are not able to do nail care. It is beyond the 

scope of practice here. 





(2) HCAP (healthcare-associated pneumonia)


Assessment/Plan: 


No changes.


He has finished a 7 day course of treatment and is clinically more comfortable, 

from a respiratory standpoint.


Done with Levaquin.


Will stop Florastor as well.


Continue Mucinex prn cough.





(3) Aspiration pneumonia


Assessment/Plan: 


No changes.


He has finished a 7 day course of treatment and is clinically more comfortable, 

from a respiratory standpoint.


Done with Clindamycin po.


Will stop Florastor as well.


Continue Mucinex prn cough.





(4) Dementia


Qualifiers: 


   Dementia type: unspecified type   Dementia behavioral disturbance: without 

behavioral disturbance   Qualified Code(s): F03.90 - Unspecified dementia 

without behavioral disturbance   


Assessment/Plan: 


No changes.


Continue clueing and reorientation. While he is loud and can get upset, can be 

calmed but low, slow voice and repetitive prompting. 


Continue Namenda, which was started at admission.


He has no family (except 1 brother who cannot be located), so no legal DPOA.


He requires placement, can no longer function independantly in a senior [facility]

rtment.


Social Workers are working on placement for him. 





(5) Facial fracture from fall


Qualifiers: 


   Encounter type: subsequent encounter   Facial bone/location: other facial 

bone   Fracture type: closed   Laterality: left 


Assessment/Plan: 


Stable





(6) Tobacco use


Assessment/Plan: 


Nearly off the Nicotine patch-mediated taper.





(7) Frequent falls


Assessment/Plan: 


His NPH likely led to these.


PT states he is still a fall risk.





(8) NPH (normal pressure hydrocephalus)


Assessment/Plan: 


with associated cerebellar ataxia, falls. 


Abnormal but stable, unchanged symptoms.


Not a candidate for shunt since his disease is too far gone.








(9) Diarrhea


Assessment/Plan: 


Resolved

## 2019-03-21 RX ADMIN — MEMANTINE HYDROCHLORIDE SCH MG: 5 TABLET ORAL at 08:38

## 2019-03-21 RX ADMIN — ENOXAPARIN SODIUM SCH MG: 100 INJECTION SUBCUTANEOUS at 08:38

## 2019-03-21 RX ADMIN — MEMANTINE HYDROCHLORIDE SCH MG: 5 TABLET ORAL at 20:42

## 2019-03-21 RX ADMIN — NICOTINE SCH PATCH: 7 PATCH TRANSDERMAL at 08:39

## 2019-03-21 RX ADMIN — Medication SCH TAB: at 08:38

## 2019-03-21 RX ADMIN — POLYETHYLENE GLYCOL 3350 SCH GM: 17 POWDER, FOR SOLUTION ORAL at 08:39

## 2019-03-21 RX ADMIN — FAMOTIDINE SCH MG: 20 TABLET, FILM COATED ORAL at 20:42

## 2019-03-21 RX ADMIN — FAMOTIDINE SCH MG: 20 TABLET, FILM COATED ORAL at 08:38

## 2019-03-21 NOTE — PROVIDER PROGRESS NOTE
Subjective





- Prog Note Date


Prog Note Date: 03/21/19


Prog Note Time: 16:13





- Subjective


Subjective: 





no new events or complaints. 





Current Medications





- Current Medications


Current Medications: 





Active Medications





Acetaminophen (Tylenol)  650 mg PO Q4HR PRN


   PRN Reason: Pain 1 to 4


Enoxaparin Sodium (Lovenox)  40 mg SUBQ DAILY [facility]


   Last Admin: 03/20/19 11:03 Dose:  40 mg


Famotidine (Pepcid)  20 mg PO BID [facility]


   Last Admin: 03/20/19 21:34 Dose:  20 mg


Guaifenesin (Mucinex)  600 mg PO BID [facility]


   Last Admin: 03/20/19 21:34 Dose:  600 mg


Memantine (Namenda)  5 mg PO BID [facility]


   Last Admin: 03/20/19 21:34 Dose:  5 mg


Multivitamins/Minerals (Theragran  M)  1 tab PO DAILYWM [facility]


   Last Admin: 03/20/19 11:04 Dose:  1 tab


Nicotine (Nicoderm)  1 patch TOP DAILY [facility]


   Stop: 03/22/19 01:00


   Last Admin: 03/20/19 11:04 Dose:  1 patch


Polyethylene Glycol (Miralax)  17 gm PO DAILY [facility]


   Last Admin: 03/20/19 11:04 Dose:  Not Given


Trazodone HCl (Desyrel)  50 mg PO QPM PRN


   PRN Reason: Insomnia


   Last Admin: 03/20/19 21:34 Dose:  50 mg





                                        





No Known Home Medications  06/18/18 











Objective





- Vital Signs/Intake & Output


Reviewed Vital Signs: Yes


Vital Signs: 


                                Vital Signs x48h











  Temp Pulse Resp BP Pulse Ox


 


 03/21/19 00:00  36.3 C L  79  16  119/64  96











Intake & Output: 


                                 Intake & Output











 03/18/19 03/19/19 03/20/19 03/21/19





 23:59 23:59 23:59 23:59


 


Intake Total 750 600 640 


 


Balance 750 600 640 














- Objective


General Appearance: positive: No acute distress, Alert


Eyes Bilateral: positive: PERRL


Neck: positive: No JVD


Respiratory: positive: Chest non-tender.  negative: Wheezes, Rales, Rhonchi


Cardiovascular: positive: Regular rate & rhythm.  negative: Gallop/S4, Friction 

rub


Abdomen: positive: Non-tender, No organomegaly, Nml bowel sounds, No distention


Skin: positive: Warm, Dry


Extremities: positive: Full ROM


Neurologic/Psychiatric: positive: CN's nml (2-12), Motor nml





- Lab Results


Fish Bones: 


                                 03/14/19 04:20





                                 03/15/19 05:31





ABX Reporting


Has patient been on IV antibiotics over the past 48 hours?: No





Sepsis Event Note (H)





- Evaluation


Current Stage of Sepsis: Ruled out





Assessment/Plan





- Problem List


(1) Hypertrophic toenail


Impression: 


These make it harder to walk.


He missed his Podiatry appointment when he was boarding in the ER.


Wound Care supposedly saw patient 3/18 but no note available as of yet. I will 

ask them.


The provider saw the patient and are not able to do nail care. It is beyond the 

scope of practice here. 





(2) HCAP (healthcare-associated pneumonia)


Assessment/Plan: 


No changes.


He has finished a 7 day course of treatment and is clinically more comfortable, 

from a respiratory standpoint.


Done with Levaquin.


Will stop Florastor as well.


Continue Mucinex prn cough.





(3) Aspiration pneumonia


Assessment/Plan: 


No changes.


He has finished a 7 day course of treatment and is clinically more comfortable, 

from a respiratory standpoint.


Done with Clindamycin po.


Will stop Florastor as well.


Continue Mucinex prn cough.





(4) Dementia


Qualifiers: 


   Dementia type: unspecified type   Dementia behavioral disturbance: without 

behavioral disturbance   Qualified Code(s): F03.90 - Unspecified dementia 

without behavioral disturbance   


Assessment/Plan: 


No changes.


Continue clueing and reorientation. While he is loud and can get upset, can be 

calmed but low, slow voice and repetitive prompting. 


Continue Namenda, which was started at admission.


He has no family (except 1 brother who cannot be located), so no legal DPOA.


He requires placement, can no longer function independantly in a senior 

apartment.


Social Workers are working on placement for him. 





(5) Facial fracture from fall


Qualifiers: 


   Encounter type: subsequent encounter   Facial bone/location: other facial b

one   Fracture type: closed   Laterality: left 


Assessment/Plan: 


Stable





(6) Tobacco use


Assessment/Plan: 


Nearly off the Nicotine patch-mediated taper.





(7) Frequent falls


Assessment/Plan: 


His NPH likely led to these.


PT states he is still a fall risk.





(8) NPH (normal pressure hydrocephalus)


Assessment/Plan: 


with associated cerebellar ataxia, falls. 


Abnormal but stable, unchanged symptoms.


Not a candidate for shunt since his disease is too far gone.








(9) Diarrhea


Assessment/Plan: 


Resolved

## 2019-03-22 RX ADMIN — MEMANTINE HYDROCHLORIDE SCH MG: 5 TABLET ORAL at 08:35

## 2019-03-22 RX ADMIN — ENOXAPARIN SODIUM SCH: 100 INJECTION SUBCUTANEOUS at 08:36

## 2019-03-22 RX ADMIN — POLYETHYLENE GLYCOL 3350 SCH GM: 17 POWDER, FOR SOLUTION ORAL at 08:35

## 2019-03-22 RX ADMIN — Medication SCH TAB: at 08:35

## 2019-03-22 RX ADMIN — MEMANTINE HYDROCHLORIDE SCH MG: 5 TABLET ORAL at 20:40

## 2019-03-22 RX ADMIN — ACETAMINOPHEN PRN MG: 325 TABLET ORAL at 08:35

## 2019-03-22 RX ADMIN — FAMOTIDINE SCH MG: 20 TABLET, FILM COATED ORAL at 20:40

## 2019-03-22 RX ADMIN — FAMOTIDINE SCH MG: 20 TABLET, FILM COATED ORAL at 08:36

## 2019-03-22 NOTE — PROVIDER PROGRESS NOTE
Subjective





- Prog Note Date


Prog Note Date: 03/22/19


Prog Note Time: 16:27





- Subjective


Subjective: 


This is an elderly white male awaiting placement.  He has dementia.  Behavioral 

defer disturbances.  Was in the emergency room for several days where he fell 

and had developed pneumonia.  He is now been on the inpatient side for 13 days. 

He has recovered, doing well.  He is on maintenance medications, and no major 

events have occurred.








Current Medications





- Current Medications


Current Medications: 





Active Medications





Acetaminophen (Tylenol)  650 mg PO Q4HR PRN


   PRN Reason: Pain 1 to 4


   Last Admin: 03/22/19 08:35 Dose:  650 mg


Enoxaparin Sodium (Lovenox)  40 mg SUBQ DAILY [facility]


   Last Admin: 03/22/19 08:36 Dose:  Not Given


Famotidine (Pepcid)  20 mg PO BID [facility]


   Last Admin: 03/22/19 08:36 Dose:  20 mg


Guaifenesin (Mucinex)  600 mg PO BID [facility]


   Last Admin: 03/22/19 08:36 Dose:  600 mg


Memantine (Namenda)  5 mg PO BID [facility]


   Last Admin: 03/22/19 08:35 Dose:  5 mg


Multivitamins/Minerals (Theragran  M)  1 tab PO DAILYWM [facility]


   Last Admin: 03/22/19 08:35 Dose:  1 tab


Polyethylene Glycol (Miralax)  17 gm PO DAILY [facility]


   Last Admin: 03/22/19 08:35 Dose:  17 gm


Trazodone HCl (Desyrel)  50 mg PO QPM PRN


   PRN Reason: Insomnia


   Last Admin: 03/21/19 20:42 Dose:  50 mg





                                        





No Known Home Medications  06/18/18 











Objective





- Vital Signs/Intake & Output


Reviewed Vital Signs: Yes


Vital Signs: 


                                Vital Signs x48h











  Temp Pulse Resp BP Pulse Ox


 


 03/22/19 15:35  36.4 C L  61  18  142/71 H  95


 


 03/22/19 08:33  36.5 C  77  18  136/78 H  94











Intake & Output: 


                                 Intake & Output











 03/19/19 03/20/19 03/21/19 03/22/19





 23:59 23:59 23:59 23:59


 


Intake Total  480


 


Output Total   250 


 


Balance  480














- Objective


General Appearance: positive: No acute distress, Alert, Other (Thin angular 

elderly man)


Eyes Bilateral: positive: PERRL, EOMI


Respiratory: positive: Chest non-tender.  negative: Wheezes, Rales, Rhonchi


Cardiovascular: positive: Regular rate & rhythm.  negative: Gallop/S4, Friction 

rub


Abdomen: positive: Non-tender, No organomegaly, Nml bowel sounds, No distention


Extremities: positive: Non-tender, Full ROM, No pedal edema


Neurologic/Psychiatric: positive: CN's nml (2-12), Motor nml, Disoriented to 

person, Disoriented to place, Disoriented to time





- Lab Results


Fish Bones: 


                                 03/14/19 04:20





                                 03/15/19 05:31





ABX Reporting


Has patient been on IV antibiotics over the past 48 hours?: No





Sepsis Event Note (H)





- Evaluation


Current Stage of Sepsis: Ruled out





Assessment/Plan





- Problem List


(1) Problem situation relating to social and personal history


Impression: 


This gentleman is awaiting placement.  No acute medical problems at this time.  

We do check on him daily.  If there are any medical needs that, we will address 

them.  At this time the patient is ambulating in his room, able to do activities

of daily living with regards to feeding self, dressing self and going to the 

bathroom.  No falls since admission.  Patient refuses SCDs for DVT prophylaxis. 

Eating 75 200% of his food.  Blood pressure anywhere from 142-159 systolic.  In 

the 70s diastolic.  Afebrile.  Oxygenating well on room air.

## 2019-03-23 RX ADMIN — ACETAMINOPHEN PRN MG: 325 TABLET ORAL at 09:57

## 2019-03-23 RX ADMIN — FAMOTIDINE SCH MG: 20 TABLET, FILM COATED ORAL at 21:08

## 2019-03-23 RX ADMIN — FAMOTIDINE SCH MG: 20 TABLET, FILM COATED ORAL at 09:57

## 2019-03-23 RX ADMIN — MEMANTINE HYDROCHLORIDE SCH MG: 5 TABLET ORAL at 21:08

## 2019-03-23 RX ADMIN — MEMANTINE HYDROCHLORIDE SCH MG: 5 TABLET ORAL at 09:57

## 2019-03-23 RX ADMIN — Medication SCH TAB: at 09:57

## 2019-03-23 RX ADMIN — ENOXAPARIN SODIUM SCH MG: 100 INJECTION SUBCUTANEOUS at 09:57

## 2019-03-23 RX ADMIN — POLYETHYLENE GLYCOL 3350 SCH: 17 POWDER, FOR SOLUTION ORAL at 09:58

## 2019-03-23 NOTE — PROVIDER PROGRESS NOTE
Subjective





- Prog Note Date


Prog Note Date: 03/23/19


Prog Note Time: 13:48





- Subjective


Subjective: 





Nursing does not report any new events.  Vitals are stable.  He is eating 100% 

of his meals today.  Taking p.o. liquids and just fine.  He is able to ambulate 

in his room without any assistance, goes to the bathroom.  Cyst in the chair.  

Watches TV.





Current Medications





- Current Medications


Current Medications: 





Active Medications





Acetaminophen (Tylenol)  650 mg PO Q4HR PRN


   PRN Reason: Pain 1 to 4


   Last Admin: 03/23/19 09:57 Dose:  650 mg


Enoxaparin Sodium (Lovenox)  40 mg SUBQ DAILY [facility]


   Last Admin: 03/23/19 09:57 Dose:  40 mg


Famotidine (Pepcid)  20 mg PO BID [facility]


   Last Admin: 03/23/19 09:57 Dose:  20 mg


Guaifenesin (Mucinex)  600 mg PO BID [facility]


   Last Admin: 03/23/19 09:57 Dose:  600 mg


Memantine (Namenda)  5 mg PO BID [facility]


   Last Admin: 03/23/19 09:57 Dose:  5 mg


Multivitamins/Minerals (Theragran  M)  1 tab PO DAILYWM [facility]


   Last Admin: 03/23/19 09:57 Dose:  1 tab


Polyethylene Glycol (Miralax)  17 gm PO DAILY [facility]


   Last Admin: 03/23/19 09:58 Dose:  Not Given


Trazodone HCl (Desyrel)  50 mg PO QPM PRN


   PRN Reason: Insomnia


   Last Admin: 03/21/19 20:42 Dose:  50 mg





                                        





No Known Home Medications  06/18/18 











Objective





- Vital Signs/Intake & Output


Reviewed Vital Signs: Yes


Vital Signs: 


                                Vital Signs x48h











  Temp Pulse Resp BP Pulse Ox


 


 03/23/19 08:27  36.3 C L  64  18  125/77  93











Intake & Output: 


                                 Intake & Output











 03/20/19 03/21/19 03/22/19 03/23/19





 23:59 23:59 23:59 23:59


 


Intake Total 640 1295 750 480


 


Output Total  250  


 


Balance 640 1045 750 480














- Objective


General Appearance: positive: No acute distress, Alert, Other (Tall lanky white 

male who looks actually is slightly younger than stated age, bearded, with 

glasses.  Disheveled ungroomed beard and hair but he is clean, wearing a 

hospital gown)


Eyes Bilateral: positive: PERRL


ENT: positive: Other (No facial asymmetry)


Neck: positive: No JVD.  negative: Stiff neck


Respiratory: positive: Chest non-tender.  negative: Wheezes, Rales, Rhonchi


Cardiovascular: positive: Regular rate & rhythm.  negative: Gallop/S4, Friction 

rub


Abdomen: positive: Non-tender, No organomegaly, Nml bowel sounds, No distention


Skin: positive: Warm, Dry


Extremities: positive: Non-tender, No pedal edema


Neurologic/Psychiatric: positive: CN's nml (2-12), Motor nml, Disoriented to 

person, Disoriented to place, Disoriented to time





- Lab Results


Fish Bones: 


                                 03/14/19 04:20





                                 03/15/19 05:31





ABX Reporting


Has patient been on IV antibiotics over the past 48 hours?: No





Sepsis Event Note (H)





- Evaluation


Current Stage of Sepsis: Ruled out





Assessment/Plan





- Problem List


(1) Problem situation relating to social and personal history


Impression: 


He has now been here 14 days.  Still awaiting placement.

## 2019-03-24 RX ADMIN — Medication SCH TAB: at 13:09

## 2019-03-24 RX ADMIN — MEMANTINE HYDROCHLORIDE SCH MG: 5 TABLET ORAL at 13:09

## 2019-03-24 RX ADMIN — ENOXAPARIN SODIUM SCH MG: 100 INJECTION SUBCUTANEOUS at 13:09

## 2019-03-24 RX ADMIN — MEMANTINE HYDROCHLORIDE SCH MG: 5 TABLET ORAL at 20:57

## 2019-03-24 RX ADMIN — FAMOTIDINE SCH MG: 20 TABLET, FILM COATED ORAL at 20:57

## 2019-03-24 RX ADMIN — FAMOTIDINE SCH MG: 20 TABLET, FILM COATED ORAL at 13:09

## 2019-03-24 RX ADMIN — POLYETHYLENE GLYCOL 3350 SCH GM: 17 POWDER, FOR SOLUTION ORAL at 14:56

## 2019-03-24 NOTE — PROVIDER PROGRESS NOTE
Subjective





- Prog Note Date


Prog Note Date: 03/24/19


Prog Note Time: 10:50





- Subjective


Subjective: 


Upright during the day.  Since in the chair.  Walks in the room.  Goes to the 

bathroom on his own.  Feeds himself breakfast.  Right now watching basketball . 

His main change in demeanor occurs at night.  Gets a little bit more 

recalcitrant but is able to be prompted.  Daytime nurse does not report any new 

events.








Current Medications





- Current Medications


Current Medications: 





Active Medications





Acetaminophen (Tylenol)  650 mg PO Q4HR PRN


   PRN Reason: Pain 1 to 4


   Last Admin: 03/23/19 09:57 Dose:  650 mg


Enoxaparin Sodium (Lovenox)  40 mg SUBQ DAILY [facility]


   Last Admin: 03/23/19 09:57 Dose:  40 mg


Famotidine (Pepcid)  20 mg PO BID [facility]


   Last Admin: 03/23/19 21:08 Dose:  20 mg


Guaifenesin (Mucinex)  600 mg PO BID [facility]


   Last Admin: 03/23/19 21:08 Dose:  600 mg


Memantine (Namenda)  5 mg PO BID [facility]


   Last Admin: 03/23/19 21:08 Dose:  5 mg


Multivitamins/Minerals (Theragran  M)  1 tab PO DAILYWM [facility]


   Last Admin: 03/23/19 09:57 Dose:  1 tab


Polyethylene Glycol (Miralax)  17 gm PO DAILY [facility]


   Last Admin: 03/23/19 09:58 Dose:  Not Given


Trazodone HCl (Desyrel)  50 mg PO QPM PRN


   PRN Reason: Insomnia


   Last Admin: 03/21/19 20:42 Dose:  50 mg





                                        





No Known Home Medications  06/18/18 











Objective





- Vital Signs/Intake & Output


Reviewed Vital Signs: Yes


Vital Signs: 


                                Vital Signs x48h











  Temp Pulse Resp BP Pulse Ox


 


 03/24/19 07:32  36.5 C  62  18  157/73 H  98











Intake & Output: 


                                 Intake & Output











 03/21/19 03/22/19 03/23/19 03/24/19





 23:59 23:59 23:59 23:59


 


Intake Total 1295 750 680 340


 


Output Total 250   


 


Balance 1045 750 680 340














- Objective


General Appearance: positive: No acute distress, Alert, Other (Bearded white 

male, tall, lean and lanky with glasses and appeared.  He pretty much ignores 

every time I come in every day and will be very monosyllabic in his responses.  

I seem to annoy him every time I come in to examine him)


Eyes Bilateral: positive: PERRL, EOMI


ENT: positive: Pharynx nml


Neck: positive: No JVD.  negative: Stiff neck, Carotid bruit


Respiratory: positive: Chest non-tender.  negative: Wheezes, Rales, Rhonchi


Cardiovascular: positive: Regular rate & rhythm.  negative: Gallop/S4, Friction 

rub


Abdomen: positive: Non-tender, No organomegaly, Nml bowel sounds, No distention


Skin: positive: Warm, Dry


Extremities: positive: Full ROM


Neurologic/Psychiatric: positive: CN's nml (2-12), Motor nml, Disoriented to 

person, Disoriented to place, Disoriented to time





- Lab Results


Fish Bones: 


                                 03/14/19 04:20





                                 03/15/19 05:31





ABX Reporting


Has patient been on IV antibiotics over the past 48 hours?: No





Sepsis Event Note (H)





- Evaluation


Current Stage of Sepsis: Ruled out





Assessment/Plan





- Problem List


(1) Problem situation relating to social and personal history


Impression: 


Awaiting placement.The problem has to do with his dementia.  He needs some type 

of outside guardianship to fill out a POLST form for him to go to [place].  

Other facilities do not seem to be asking for that and we are basically waiting 

for social service to find a place willing to accept the payment stated by his 

insurance.

## 2019-03-25 RX ADMIN — ENOXAPARIN SODIUM SCH MG: 100 INJECTION SUBCUTANEOUS at 08:45

## 2019-03-25 RX ADMIN — FAMOTIDINE SCH MG: 20 TABLET, FILM COATED ORAL at 08:38

## 2019-03-25 RX ADMIN — MEMANTINE HYDROCHLORIDE SCH MG: 5 TABLET ORAL at 08:38

## 2019-03-25 RX ADMIN — FAMOTIDINE SCH MG: 20 TABLET, FILM COATED ORAL at 21:56

## 2019-03-25 RX ADMIN — MEMANTINE HYDROCHLORIDE SCH MG: 5 TABLET ORAL at 21:56

## 2019-03-25 RX ADMIN — Medication SCH TAB: at 08:39

## 2019-03-25 RX ADMIN — POLYETHYLENE GLYCOL 3350 SCH GM: 17 POWDER, FOR SOLUTION ORAL at 08:43

## 2019-03-25 NOTE — PROVIDER PROGRESS NOTE
Subjective





- Prog Note Date


Prog Note Date: 03/25/19


Prog Note Time: 11:14





- Subjective


Pt reports feeling: No change


Subjective: 


Spends his time in his room.  Feeds himself.  Gets up to go to the bathroom on 

his own.  Watches TV.  This weekend off and on has been basketball, right now he

is watching old time westerns.  Black and white.








Current Medications





- Current Medications


Current Medications: 





Active Medications











Generic Name Dose Route Start Last Admin





  Trade Name Freq  PRN Reason Stop Dose Admin


 


Acetaminophen  650 mg  03/10/19 11:56  03/23/19 09:57





  Tylenol  PO   650 mg





  Q4HR PRN   Administration





  Pain 1 to 4   





     





     





     


 


Enoxaparin Sodium  40 mg  03/11/19 09:00  03/25/19 08:45





  Lovenox  SUBQ   40 mg





  DAILY [facility]   Administration





     





     





     





     


 


Famotidine  20 mg  03/10/19 21:00  03/25/19 08:38





  Pepcid  PO   20 mg





  BID [facility]   Administration





     





     





     





     


 


Guaifenesin  600 mg  03/12/19 21:00  03/25/19 08:38





  Mucinex  PO   600 mg





  BID [facility]   Administration





     





     





     





     


 


Memantine  5 mg  03/10/19 21:00  03/25/19 08:38





  Namenda  PO   5 mg





  BID [facility]   Administration





     





     





     





     


 


Multivitamins/Minerals  1 tab  03/11/19 16:00  03/25/19 08:39





  Theragran  M  PO   1 tab





  DAILYWM [facility]   Administration





     





     





     





     


 


Polyethylene Glycol  17 gm  03/11/19 09:00  03/25/19 08:43





  Miralax  PO   17 gm





  DAILY [facility]   Administration





     





     





     





     


 


Trazodone HCl  50 mg  03/11/19 21:44  03/21/19 20:42





  Desyrel  PO   50 mg





  QPM PRN   Administration





  Insomnia   





     





     





     








                                        





No Known Home Medications  06/18/18 











Objective





- Vital Signs/Intake & Output


Reviewed Vital Signs: Yes


Vital Signs: 


                                Vital Signs x48h











  Temp Pulse Resp BP Pulse Ox


 


 03/25/19 07:35  36.3 C L  79  18  130/69  92











Intake & Output: 


                                 Intake & Output











 03/22/19 03/23/19 03/24/19 03/25/19





 23:59 23:59 23:59 23:59


 


Intake Total 750 680 830 240


 


Balance 750 680 830 240














- Objective


General Appearance: positive: Alert, Other (tall, thin, glasses and a beard)


Eyes Bilateral: positive: PERRL


Respiratory: positive: No respiratory distress.  negative: Wheezes, Rales, 

Rhonchi


Cardiovascular: positive: Regular rate & rhythm.  negative: Gallop/S4, Friction 

rub


Abdomen: positive: Non-tender, No organomegaly, Nml bowel sounds, No distention


Neurologic/Psychiatric: positive: Oriented x3, CN's nml (2-12), Motor nml, 

Disoriented to person, Disoriented to place, Disoriented to time





- Lab Results


Fish Bones: 


                                 03/14/19 04:20





                                 03/15/19 05:31





ABX Reporting


Has patient been on IV antibiotics over the past 48 hours?: No





Sepsis Event Note (H)





- Evaluation


Current Stage of Sepsis: Ruled out





Assessment/Plan





- Problem List


(1) Problem situation relating to social and personal history


Impression: 


Dementia patient.  Not able to live alone in the home.  Awaiting placement.  Now

day #16

## 2019-03-26 RX ADMIN — MEMANTINE HYDROCHLORIDE SCH MG: 5 TABLET ORAL at 20:18

## 2019-03-26 RX ADMIN — FAMOTIDINE SCH MG: 20 TABLET, FILM COATED ORAL at 11:02

## 2019-03-26 RX ADMIN — ENOXAPARIN SODIUM SCH MG: 100 INJECTION SUBCUTANEOUS at 11:02

## 2019-03-26 RX ADMIN — Medication SCH TAB: at 11:00

## 2019-03-26 RX ADMIN — POLYETHYLENE GLYCOL 3350 SCH GM: 17 POWDER, FOR SOLUTION ORAL at 11:06

## 2019-03-26 RX ADMIN — FAMOTIDINE SCH MG: 20 TABLET, FILM COATED ORAL at 20:18

## 2019-03-26 RX ADMIN — MEMANTINE HYDROCHLORIDE SCH MG: 5 TABLET ORAL at 11:00

## 2019-03-26 NOTE — PROVIDER PROGRESS NOTE
Assessment/Plan





- Problem List


(1) Dementia


Qualifiers: 


   Dementia type: unspecified type   Dementia behavioral disturbance: without 

behavioral disturbance   Qualified Code(s): F03.90 - Unspecified dementia 

without behavioral disturbance   


Assessment/Plan: 


Unchanged.








(2) NPH (normal pressure hydrocephalus)


Assessment/Plan: 


No intervention planned








(3) Problem situation relating to social and personal history


Assessment/Plan: 


He cannot return to living alone. Placement and legal issues being worked on.








- Current Meds


Current Meds: 





                               Current Medications











Generic Name Dose Route Start Last Admin





  Trade Name Freq  PRN Reason Stop Dose Admin


 


Acetaminophen  650 mg  03/10/19 11:56  03/23/19 09:57





  Tylenol  PO   650 mg





  Q4HR PRN   Administration





  Pain 1 to 4   





     





     





     


 


Enoxaparin Sodium  40 mg  03/11/19 09:00  03/25/19 08:45





  Lovenox  SUBQ   40 mg





  DAILY JAISON   Administration





     





     





     





     


 


Famotidine  20 mg  03/10/19 21:00  03/25/19 21:56





  Pepcid  PO   20 mg





  BID JAISON   Administration





     





     





     





     


 


Guaifenesin  600 mg  03/12/19 21:00  03/25/19 21:56





  Mucinex  PO   600 mg





  BID JAISON   Administration





     





     





     





     


 


Memantine  5 mg  03/10/19 21:00  03/25/19 21:56





  Namenda  PO   5 mg





  BID JAISON   Administration





     





     





     





     


 


Multivitamins/Minerals  1 tab  03/11/19 16:00  03/25/19 08:39





  Theragran  M  PO   1 tab





  DAILYWM JAISON   Administration





     





     





     





     


 


Polyethylene Glycol  17 gm  03/11/19 09:00  03/25/19 08:43





  Miralax  PO   17 gm





  DAILY JAISON   Administration





     





     





     





     


 


Trazodone HCl  50 mg  03/11/19 21:44  03/21/19 20:42





  Desyrel  PO   50 mg





  QPM PRN   Administration





  Insomnia   





     





     





     














- Lab Result


Fish Bone Diagrams: 


                                 03/14/19 04:20





                                 03/15/19 05:31





Subjective





- Subjective


Patient Reports: No Complaints


Nursing Reports: No Complaints





Objective


Vital Signs: 





                               Vital Signs - 24 hr











  03/25/19 03/26/19 03/26/19





  15:40 00:00 08:27


 


Temperature 36.6 C 36.8 C 36.5 C


 


Heart Rate [ 87 88 66





Brachial]   


 


Respiratory 22 16 18





Rate   


 


Blood Pressure 114/61 119/57 L 155/79 H





[Right Brachial   





artery]   


 


O2 Saturation 96 94 98








                                     Oxygen











O2 Source                      Room air














I&O (Last 24 Hrs): 





                          Intake and Output Totals x24h











 03/24/19 03/25/19 03/26/19





 23:59 23:59 23:59


 


Intake Total 830 1150 240


 


Balance 830 1150 240











General: Other (Spends his time in his room.  Feeds himself.  Gets up to go to 

the bathroom on his own.  Watches TV.  Says Hi and waves.)


Neck: Supple


Neuro: Disoriented


Respiratory: No respiratory distress


Abdomen: Soft





- Results


Results: 





                               Laboratory Results











WBC  2.8 x10^3/uL (4.8-10.8)  L  03/14/19  04:20    


 


RBC  3.29 10^6/uL (4.70-6.10)  L  03/14/19  04:20    


 


Hgb  11.5 g/dL (14.0-18.0)  L  03/14/19  04:20    


 


Hct  33.4 % (42.0-52.0)  L  03/14/19  04:20    


 


MCV  101.5 fL (80.0-94.0)  H  03/14/19  04:20    


 


MCH  35.0 pg (27.0-31.0)  H  03/14/19  04:20    


 


MCHC  34.5 g/dL (32.0-36.0)   03/14/19  04:20    


 


RDW  15.6 % (12.0-15.0)  H  03/14/19  04:20    


 


Plt Count  113 10^3/uL (130-450)  L  03/14/19  04:20    


 


MPV  9.2 fL (7.4-11.4)   03/14/19  04:20    


 


Neut # (Auto)  1.5 10^3/uL (1.5-6.6)   03/14/19  04:20    


 


Lymph # (Auto)  0.8 10^3/uL (1.5-3.5)  L  03/14/19  04:20    


 


Mono # (Auto)  0.5 10^3/uL (0.0-1.0)   03/14/19  04:20    


 


Eos # (Auto)  0.0 10^3/uL (0.0-0.7)   03/14/19  04:20    


 


Baso # (Auto)  0.0 10^3/uL (0.0-0.1)   03/14/19  04:20    


 


Absolute Nucleated RBC  0.00 x10^3/uL  03/14/19  04:20    


 


Total Counted  100   03/13/19  05:05    


 


Band Neuts % (Manual)  0 % (0-10)  03/13/19  05:05    


 


Reactive Lymphs % (Man)  6 %  03/06/19  08:37    


 


Abnorm Lymph % (Manual)  0 %  03/13/19  05:05    


 


Nucleated RBC %  0.1 /100WBC  03/14/19  04:20    


 


Neutrophils # (Manual)  1.5 10^3/uL (1.5-6.6)   03/13/19  05:05    


 


Lymphocytes # (Manual)  0.6 10^3/uL (1.5-3.5)  L  03/13/19  05:05    


 


Monocytes # (Manual)  0.3 10^3/uL (0.0-1.0)   03/13/19  05:05    


 


Eosinophils # (Manual)  0.0 10^3/uL (0-0.7)   03/13/19  05:05    


 


Basophils # (Manual)  0.0 10^3/uL (0-0.1)   03/13/19  05:05    


 


Differential Comment  MANUAL DIFFERENTIAL   03/06/19  08:37    


 


Manual Slide Review  Indicated   03/14/19  04:20    


 


Platelet Estimate  DECREASED (<130,000)  (NORMAL)   03/14/19  04:20    


 


Platelet Morphology  RARE GIANT PLATELETS  (NORMAL)   03/06/19  08:37    


 


RBC Morph Micro Appear  1+ ANISOCYTOSIS  (NORMAL)   03/14/19  04:20    


 


Sodium  139 mmol/L (135-145)   03/15/19  05:31    


 


Potassium  3.6 mmol/L (3.5-5.0)   03/15/19  05:31    


 


Chloride  105 mmol/L (101-111)   03/15/19  05:31    


 


Carbon Dioxide  26 mmol/L (21-32)   03/15/19  05:31    


 


Anion Gap  8.0  (6-13)   03/15/19  05:31    


 


BUN  15 mg/dL (6-20)   03/15/19  05:31    


 


Creatinine  0.9 mg/dL (0.6-1.2)   03/15/19  05:31    


 


Estimated GFR (MDRD)  82  (>89)  L  03/15/19  05:31    


 


Glucose  93 mg/dL ()   03/15/19  05:31    


 


POC Whole Bld Glucose  70 mg/dL (70 - 100)   03/09/19  11:34    


 


Lactic Acid  1.0 mmol/L (0.5-2.2)   03/10/19  09:15    


 


Calcium  7.9 mg/dL (8.5-10.3)  L  03/15/19  05:31    


 


Phosphorus  1.7 mg/dL (2.5-4.6)  L  03/12/19  06:07    


 


Total Bilirubin  0.6 mg/dL (0.2-1.0)   03/09/19  13:37    


 


AST  30 IU/L (10-42)   03/09/19  13:37    


 


ALT  14 IU/L (10-60)   03/09/19  13:37    


 


Alkaline Phosphatase  98 IU/L ()   03/09/19  13:37    


 


Total Creatine Kinase  122 IU/L ()   03/06/19  08:37    


 


Troponin I  < 0.04 ng/mL (<0.49)   03/09/19  13:37    


 


Total Protein  6.6 g/dL (6.7-8.2)  L  03/09/19  13:37    


 


Albumin  2.7 g/dL (3.2-5.5)  L  03/12/19  06:07    


 


Globulin  3.2 g/dL (2.1-4.2)   03/09/19  13:37    


 


Albumin/Globulin Ratio  1.1  (1.0-2.2)   03/09/19  13:37    


 


Lipase  30 U/L (22-51)   03/09/19  13:37    


 


Urine Color  YELLOW   03/08/19  18:29    


 


Urine Clarity  CLEAR  (CLEAR)   03/08/19  18:29    


 


Urine pH  6.0 PH (5.0-7.5)   03/08/19  18:29    


 


Ur Specific Gravity  1.025  (1.002-1.030)   03/08/19  18:29    


 


Urine Protein  NEGATIVE mg/dL (NEGATIVE)   03/08/19  18:29    


 


Urine Glucose (UA)  NEGATIVE mg/dL (NEGATIVE)   03/08/19  18:29    


 


Urine Ketones  15 mg/dL (NEGATIVE)  H  03/08/19  18:29    


 


Urine Occult Blood  SMALL  (NEGATIVE)  H  03/08/19  18:29    


 


Urine Nitrite  NEGATIVE  (NEGATIVE)   03/08/19  18:29    


 


Urine Bilirubin  NEGATIVE  (NEGATIVE)   03/08/19  18:29    


 


Urine Urobilinogen  0.2 (NORMAL) E.U./dL (NORMAL)   03/08/19  18:29    


 


Ur Leukocyte Esterase  NEGATIVE  (NEGATIVE)   03/08/19  18:29    


 


Urine RBC  6-10 /HPF (0-5)  H  03/08/19  18:29    


 


Urine WBC  0-3 /HPF (0-3)   03/08/19  18:29    


 


Ur Squamous Epith Cells  RARE Squamous  (<= Few)   03/08/19  18:29    


 


Urine Bacteria  Rare /HPF (None Seen)   03/08/19  18:29    


 


Urine Mucus  Few Strands   03/08/19  18:29    


 


Ur Microscopic Review  INDICATED   03/08/19  18:29    


 


Urine Culture Comments  NOT INDICATED   03/08/19  18:29    


 


Last Dose Date  UNK   03/12/19  09:30    


 


Last Dose Time  UNK   03/12/19  09:30    


 


Vancomycin Trough  17.4 ug/mL (10.0-20.0)   03/12/19  09:30    


 


Urine Opiates Screen  NEGATIVE  (NEGATIVE)   03/04/19  00:00    


 


Ur Oxycodone Screen  NEGATIVE  (NEGATIVE)   03/04/19  00:00    


 


Urine Methadone Screen  NEGATIVE  (NEGATIVE)   03/04/19  00:00    


 


Ur Propoxyphene Screen  NEGATIVE  (NEGATIVE)   03/04/19  00:00    


 


Ur Barbiturates Screen  NEGATIVE  (NEGATIVE)   03/04/19  00:00    


 


Ur Tricyclics Screen  NEGATIVE  (NEGATIVE)   03/04/19  00:00    


 


Ur Phencyclidine Scrn  NEGATIVE  (NEGATIVE)   03/04/19  00:00    


 


Ur Amphetamine Screen  NEGATIVE  (NEGATIVE)   03/04/19  00:00    


 


U Methamphetamines Scrn  NEGATIVE  (NEGATIVE)   03/04/19  00:00    


 


U Benzodiazepines Scrn  NEGATIVE  (NEGATIVE)   03/04/19  00:00    


 


Urine Cocaine Screen  NEGATIVE  (NEGATIVE)   03/04/19  00:00    


 


U Cannabinoids Screen  NEGATIVE  (NEGATIVE)   03/04/19  00:00    


 


C. difficile Tox B Gene  NEGATIVE  (NEGATIVE)   03/10/19  07:43    














Sepsis Event Note (H)





- Evaluation


Current Stage of Sepsis: Ruled out

## 2019-03-27 RX ADMIN — FAMOTIDINE SCH MG: 20 TABLET, FILM COATED ORAL at 08:54

## 2019-03-27 RX ADMIN — MEMANTINE HYDROCHLORIDE SCH MG: 5 TABLET ORAL at 20:24

## 2019-03-27 RX ADMIN — MEMANTINE HYDROCHLORIDE SCH MG: 5 TABLET ORAL at 08:54

## 2019-03-27 RX ADMIN — POLYETHYLENE GLYCOL 3350 SCH GM: 17 POWDER, FOR SOLUTION ORAL at 08:57

## 2019-03-27 RX ADMIN — Medication SCH TAB: at 08:54

## 2019-03-27 RX ADMIN — ENOXAPARIN SODIUM SCH MG: 100 INJECTION SUBCUTANEOUS at 08:57

## 2019-03-27 RX ADMIN — FAMOTIDINE SCH MG: 20 TABLET, FILM COATED ORAL at 20:24

## 2019-03-27 NOTE — PROVIDER PROGRESS NOTE
Assessment/Plan





- Problem List


(1) Dementia


Qualifiers: 


   Dementia type: unspecified type   Dementia behavioral disturbance: without 

behavioral disturbance   Qualified Code(s): F03.90 - Unspecified dementia 

without behavioral disturbance   


Assessment/Plan: 


Continue Namenda.


Awaiting placement since return to his residence alone is not safe.








(2) NPH (normal pressure hydrocephalus)


Assessment/Plan: 


Stable








(3) Problem situation relating to social and personal history


Assessment/Plan: 


Will stop the scheduled Mucinex, his resp status has normalized.


Will change VS checks to daily.


Awaiting placement since return to his residence alone is not safe.








- Current Meds


Current Meds: 





                               Current Medications











Generic Name Dose Route Start Last Admin





  Trade Name Freq  PRN Reason Stop Dose Admin


 


Acetaminophen  650 mg  03/10/19 11:56  03/23/19 09:57





  Tylenol  PO   650 mg





  Q4HR PRN   Administration





  Pain 1 to 4   





     





     





     


 


Enoxaparin Sodium  40 mg  03/11/19 09:00  03/26/19 11:02





  Lovenox  SUBQ   40 mg





  DAILY JAISON   Administration





     





     





     





     


 


Famotidine  20 mg  03/10/19 21:00  03/26/19 20:18





  Pepcid  PO   20 mg





  BID JAISON   Administration





     





     





     





     


 


Memantine  5 mg  03/10/19 21:00  03/26/19 20:18





  Namenda  PO   5 mg





  BID JAISON   Administration





     





     





     





     


 


Multivitamins/Minerals  1 tab  03/11/19 16:00  03/26/19 11:00





  Theragran  M  PO   1 tab





  DAILYWM JAISON   Administration





     





     





     





     


 


Polyethylene Glycol  17 gm  03/11/19 09:00  03/26/19 11:06





  Miralax  PO   17 gm





  DAILY JAISON   Administration





     





     





     





     


 


Trazodone HCl  50 mg  03/11/19 21:44  03/26/19 20:18





  Desyrel  PO   50 mg





  QPM PRN   Administration





  Insomnia   





     





     





     














- Lab Result


Fish Bone Diagrams: 


                                 03/14/19 04:20





                                 03/15/19 05:31





Subjective





- Subjective


Patient Reports: No Complaints





Objective


Vital Signs: 





                               Vital Signs - 24 hr











  03/26/19 03/27/19





  16:00 00:10


 


Temperature 37.1 C 36.8 C


 


Heart Rate [ 77 62





Brachial]  


 


Respiratory 16 16





Rate  


 


Blood Pressure 127/63 145/70 H





[Right Brachial  





artery]  


 


O2 Saturation 94 96








                                     Oxygen











O2 Source                      Room air














I&O (Last 24 Hrs): 





                          Intake and Output Totals x24h











 03/25/19 03/26/19 03/27/19





 23:59 23:59 23:59


 


Intake Total 1150 780 100


 


Balance 1150 780 100











General: Alert


HEENT: Mucous membr. moist/pink


Neuro: Disoriented


Respiratory: No respiratory distress


Extremities: No edema





- Results


Results: 





                               Laboratory Results











WBC  2.8 x10^3/uL (4.8-10.8)  L  03/14/19  04:20    


 


RBC  3.29 10^6/uL (4.70-6.10)  L  03/14/19  04:20    


 


Hgb  11.5 g/dL (14.0-18.0)  L  03/14/19  04:20    


 


Hct  33.4 % (42.0-52.0)  L  03/14/19  04:20    


 


MCV  101.5 fL (80.0-94.0)  H  03/14/19  04:20    


 


MCH  35.0 pg (27.0-31.0)  H  03/14/19  04:20    


 


MCHC  34.5 g/dL (32.0-36.0)   03/14/19  04:20    


 


RDW  15.6 % (12.0-15.0)  H  03/14/19  04:20    


 


Plt Count  113 10^3/uL (130-450)  L  03/14/19  04:20    


 


MPV  9.2 fL (7.4-11.4)   03/14/19  04:20    


 


Neut # (Auto)  1.5 10^3/uL (1.5-6.6)   03/14/19  04:20    


 


Lymph # (Auto)  0.8 10^3/uL (1.5-3.5)  L  03/14/19  04:20    


 


Mono # (Auto)  0.5 10^3/uL (0.0-1.0)   03/14/19  04:20    


 


Eos # (Auto)  0.0 10^3/uL (0.0-0.7)   03/14/19  04:20    


 


Baso # (Auto)  0.0 10^3/uL (0.0-0.1)   03/14/19  04:20    


 


Absolute Nucleated RBC  0.00 x10^3/uL  03/14/19  04:20    


 


Total Counted  100   03/13/19  05:05    


 


Band Neuts % (Manual)  0 % (0-10)  03/13/19  05:05    


 


Reactive Lymphs % (Man)  6 %  03/06/19  08:37    


 


Abnorm Lymph % (Manual)  0 %  03/13/19  05:05    


 


Nucleated RBC %  0.1 /100WBC  03/14/19  04:20    


 


Neutrophils # (Manual)  1.5 10^3/uL (1.5-6.6)   03/13/19  05:05    


 


Lymphocytes # (Manual)  0.6 10^3/uL (1.5-3.5)  L  03/13/19  05:05    


 


Monocytes # (Manual)  0.3 10^3/uL (0.0-1.0)   03/13/19  05:05    


 


Eosinophils # (Manual)  0.0 10^3/uL (0-0.7)   03/13/19  05:05    


 


Basophils # (Manual)  0.0 10^3/uL (0-0.1)   03/13/19  05:05    


 


Differential Comment  MANUAL DIFFERENTIAL   03/06/19  08:37    


 


Manual Slide Review  Indicated   03/14/19  04:20    


 


Platelet Estimate  DECREASED (<130,000)  (NORMAL)   03/14/19  04:20    


 


Platelet Morphology  RARE GIANT PLATELETS  (NORMAL)   03/06/19  08:37    


 


RBC Morph Micro Appear  1+ ANISOCYTOSIS  (NORMAL)   03/14/19  04:20    


 


Sodium  139 mmol/L (135-145)   03/15/19  05:31    


 


Potassium  3.6 mmol/L (3.5-5.0)   03/15/19  05:31    


 


Chloride  105 mmol/L (101-111)   03/15/19  05:31    


 


Carbon Dioxide  26 mmol/L (21-32)   03/15/19  05:31    


 


Anion Gap  8.0  (6-13)   03/15/19  05:31    


 


BUN  15 mg/dL (6-20)   03/15/19  05:31    


 


Creatinine  0.9 mg/dL (0.6-1.2)   03/15/19  05:31    


 


Estimated GFR (MDRD)  82  (>89)  L  03/15/19  05:31    


 


Glucose  93 mg/dL ()   03/15/19  05:31    


 


POC Whole Bld Glucose  70 mg/dL (70 - 100)   03/09/19  11:34    


 


Lactic Acid  1.0 mmol/L (0.5-2.2)   03/10/19  09:15    


 


Calcium  7.9 mg/dL (8.5-10.3)  L  03/15/19  05:31    


 


Phosphorus  1.7 mg/dL (2.5-4.6)  L  03/12/19  06:07    


 


Total Bilirubin  0.6 mg/dL (0.2-1.0)   03/09/19  13:37    


 


AST  30 IU/L (10-42)   03/09/19  13:37    


 


ALT  14 IU/L (10-60)   03/09/19  13:37    


 


Alkaline Phosphatase  98 IU/L ()   03/09/19  13:37    


 


Total Creatine Kinase  122 IU/L ()   03/06/19  08:37    


 


Troponin I  < 0.04 ng/mL (<0.49)   03/09/19  13:37    


 


Total Protein  6.6 g/dL (6.7-8.2)  L  03/09/19  13:37    


 


Albumin  2.7 g/dL (3.2-5.5)  L  03/12/19  06:07    


 


Globulin  3.2 g/dL (2.1-4.2)   03/09/19  13:37    


 


Albumin/Globulin Ratio  1.1  (1.0-2.2)   03/09/19  13:37    


 


Lipase  30 U/L (22-51)   03/09/19  13:37    


 


Urine Color  YELLOW   03/08/19  18:29    


 


Urine Clarity  CLEAR  (CLEAR)   03/08/19  18:29    


 


Urine pH  6.0 PH (5.0-7.5)   03/08/19  18:29    


 


Ur Specific Gravity  1.025  (1.002-1.030)   03/08/19  18:29    


 


Urine Protein  NEGATIVE mg/dL (NEGATIVE)   03/08/19  18:29    


 


Urine Glucose (UA)  NEGATIVE mg/dL (NEGATIVE)   03/08/19  18:29    


 


Urine Ketones  15 mg/dL (NEGATIVE)  H  03/08/19  18:29    


 


Urine Occult Blood  SMALL  (NEGATIVE)  H  03/08/19  18:29    


 


Urine Nitrite  NEGATIVE  (NEGATIVE)   03/08/19  18:29    


 


Urine Bilirubin  NEGATIVE  (NEGATIVE)   03/08/19  18:29    


 


Urine Urobilinogen  0.2 (NORMAL) E.U./dL (NORMAL)   03/08/19  18:29    


 


Ur Leukocyte Esterase  NEGATIVE  (NEGATIVE)   03/08/19  18:29    


 


Urine RBC  6-10 /HPF (0-5)  H  03/08/19  18:29    


 


Urine WBC  0-3 /HPF (0-3)   03/08/19  18:29    


 


Ur Squamous Epith Cells  RARE Squamous  (<= Few)   03/08/19  18:29    


 


Urine Bacteria  Rare /HPF (None Seen)   03/08/19  18:29    


 


Urine Mucus  Few Strands   03/08/19  18:29    


 


Ur Microscopic Review  INDICATED   03/08/19  18:29    


 


Urine Culture Comments  NOT INDICATED   03/08/19  18:29    


 


Last Dose Date  UNK   03/12/19  09:30    


 


Last Dose Time  UNK   03/12/19  09:30    


 


Vancomycin Trough  17.4 ug/mL (10.0-20.0)   03/12/19  09:30    


 


Urine Opiates Screen  NEGATIVE  (NEGATIVE)   03/04/19  00:00    


 


Ur Oxycodone Screen  NEGATIVE  (NEGATIVE)   03/04/19  00:00    


 


Urine Methadone Screen  NEGATIVE  (NEGATIVE)   03/04/19  00:00    


 


Ur Propoxyphene Screen  NEGATIVE  (NEGATIVE)   03/04/19  00:00    


 


Ur Barbiturates Screen  NEGATIVE  (NEGATIVE)   03/04/19  00:00    


 


Ur Tricyclics Screen  NEGATIVE  (NEGATIVE)   03/04/19  00:00    


 


Ur Phencyclidine Scrn  NEGATIVE  (NEGATIVE)   03/04/19  00:00    


 


Ur Amphetamine Screen  NEGATIVE  (NEGATIVE)   03/04/19  00:00    


 


U Methamphetamines Scrn  NEGATIVE  (NEGATIVE)   03/04/19  00:00    


 


U Benzodiazepines Scrn  NEGATIVE  (NEGATIVE)   03/04/19  00:00    


 


Urine Cocaine Screen  NEGATIVE  (NEGATIVE)   03/04/19  00:00    


 


U Cannabinoids Screen  NEGATIVE  (NEGATIVE)   03/04/19  00:00    


 


C. difficile Tox B Gene  NEGATIVE  (NEGATIVE)   03/10/19  07:43    














Sepsis Event Note (H)





- Evaluation


Current Stage of Sepsis: Ruled out

## 2019-03-28 RX ADMIN — POLYETHYLENE GLYCOL 3350 SCH GM: 17 POWDER, FOR SOLUTION ORAL at 09:12

## 2019-03-28 RX ADMIN — Medication SCH TAB: at 09:10

## 2019-03-28 RX ADMIN — FAMOTIDINE SCH MG: 20 TABLET, FILM COATED ORAL at 09:09

## 2019-03-28 RX ADMIN — MEMANTINE HYDROCHLORIDE SCH MG: 5 TABLET ORAL at 09:11

## 2019-03-28 RX ADMIN — ENOXAPARIN SODIUM SCH MG: 100 INJECTION SUBCUTANEOUS at 09:10

## 2019-03-28 RX ADMIN — FAMOTIDINE SCH MG: 20 TABLET, FILM COATED ORAL at 20:02

## 2019-03-28 RX ADMIN — MEMANTINE HYDROCHLORIDE SCH MG: 5 TABLET ORAL at 20:02

## 2019-03-28 NOTE — PROVIDER PROGRESS NOTE
Assessment/Plan





- Problem List


(1) Dementia


Qualifiers: 


   Dementia type: unspecified type   Dementia behavioral disturbance: without 

behavioral disturbance   Qualified Code(s): F03.90 - Unspecified dementia 

without behavioral disturbance   


Assessment/Plan: 


Unchanged








(2) NPH (normal pressure hydrocephalus)


Assessment/Plan: 


Unchanged








(3) Problem situation relating to social and personal history


Assessment/Plan: 


Unchanged








- Current Meds


Current Meds: 





                               Current Medications











Generic Name Dose Route Start Last Admin





  Trade Name Freq  PRN Reason Stop Dose Admin


 


Acetaminophen  650 mg  03/10/19 11:56  03/23/19 09:57





  Tylenol  PO   650 mg





  Q4HR PRN   Administration





  Pain 1 to 4   





     





     





     


 


Enoxaparin Sodium  40 mg  03/11/19 09:00  03/28/19 09:10





  Lovenox  SUBQ   40 mg





  DAILY JAISON   Administration





     





     





     





     


 


Famotidine  20 mg  03/10/19 21:00  03/28/19 09:09





  Pepcid  PO   20 mg





  BID JAISON   Administration





     





     





     





     


 


Memantine  5 mg  03/10/19 21:00  03/28/19 09:11





  Namenda  PO   5 mg





  BID JAISON   Administration





     





     





     





     


 


Multivitamins/Minerals  1 tab  03/11/19 16:00  03/28/19 09:10





  Theragran  M  PO   1 tab





  DAILYWM JAISON   Administration





     





     





     





     


 


Polyethylene Glycol  17 gm  03/11/19 09:00  03/28/19 09:12





  Miralax  PO   17 gm





  DAILY JAISON   Administration





     





     





     





     


 


Trazodone HCl  50 mg  03/11/19 21:44  03/27/19 20:24





  Desyrel  PO   50 mg





  QPM PRN   Administration





  Insomnia   





     





     





     














- Lab Result


Fish Bone Diagrams: 


                                 03/14/19 04:20





                                 03/15/19 05:31





Subjective





- Subjective


Patient Reports: Resting Comfortably





Objective


Vital Signs: 





                               Vital Signs - 24 hr











  03/28/19





  09:26


 


Temperature 36.4 C L


 


Heart Rate [ 75





Brachial] 


 


Respiratory 18





Rate 


 


Blood Pressure 116/56 L





[Right Brachial 





artery] 


 


O2 Saturation 94








                                     Oxygen











O2 Source                      Room air














I&O (Last 24 Hrs): 





                          Intake and Output Totals x24h











 03/26/19 03/27/19 03/28/19





 23:59 23:59 23:59


 


Intake Total 780 1190 920


 


Balance 780 1190 920











General: Alert


Respiratory: No respiratory distress





- Results


Results: 





                               Laboratory Results











WBC  2.8 x10^3/uL (4.8-10.8)  L  03/14/19  04:20    


 


RBC  3.29 10^6/uL (4.70-6.10)  L  03/14/19  04:20    


 


Hgb  11.5 g/dL (14.0-18.0)  L  03/14/19  04:20    


 


Hct  33.4 % (42.0-52.0)  L  03/14/19  04:20    


 


MCV  101.5 fL (80.0-94.0)  H  03/14/19  04:20    


 


MCH  35.0 pg (27.0-31.0)  H  03/14/19  04:20    


 


MCHC  34.5 g/dL (32.0-36.0)   03/14/19  04:20    


 


RDW  15.6 % (12.0-15.0)  H  03/14/19  04:20    


 


Plt Count  113 10^3/uL (130-450)  L  03/14/19  04:20    


 


MPV  9.2 fL (7.4-11.4)   03/14/19  04:20    


 


Neut # (Auto)  1.5 10^3/uL (1.5-6.6)   03/14/19  04:20    


 


Lymph # (Auto)  0.8 10^3/uL (1.5-3.5)  L  03/14/19  04:20    


 


Mono # (Auto)  0.5 10^3/uL (0.0-1.0)   03/14/19  04:20    


 


Eos # (Auto)  0.0 10^3/uL (0.0-0.7)   03/14/19  04:20    


 


Baso # (Auto)  0.0 10^3/uL (0.0-0.1)   03/14/19  04:20    


 


Absolute Nucleated RBC  0.00 x10^3/uL  03/14/19  04:20    


 


Total Counted  100   03/13/19  05:05    


 


Band Neuts % (Manual)  0 % (0-10)  03/13/19  05:05    


 


Reactive Lymphs % (Man)  6 %  03/06/19  08:37    


 


Abnorm Lymph % (Manual)  0 %  03/13/19  05:05    


 


Nucleated RBC %  0.1 /100WBC  03/14/19  04:20    


 


Neutrophils # (Manual)  1.5 10^3/uL (1.5-6.6)   03/13/19  05:05    


 


Lymphocytes # (Manual)  0.6 10^3/uL (1.5-3.5)  L  03/13/19  05:05    


 


Monocytes # (Manual)  0.3 10^3/uL (0.0-1.0)   03/13/19  05:05    


 


Eosinophils # (Manual)  0.0 10^3/uL (0-0.7)   03/13/19  05:05    


 


Basophils # (Manual)  0.0 10^3/uL (0-0.1)   03/13/19  05:05    


 


Differential Comment  MANUAL DIFFERENTIAL   03/06/19  08:37    


 


Manual Slide Review  Indicated   03/14/19  04:20    


 


Platelet Estimate  DECREASED (<130,000)  (NORMAL)   03/14/19  04:20    


 


Platelet Morphology  RARE GIANT PLATELETS  (NORMAL)   03/06/19  08:37    


 


RBC Morph Micro Appear  1+ ANISOCYTOSIS  (NORMAL)   03/14/19  04:20    


 


Sodium  139 mmol/L (135-145)   03/15/19  05:31    


 


Potassium  3.6 mmol/L (3.5-5.0)   03/15/19  05:31    


 


Chloride  105 mmol/L (101-111)   03/15/19  05:31    


 


Carbon Dioxide  26 mmol/L (21-32)   03/15/19  05:31    


 


Anion Gap  8.0  (6-13)   03/15/19  05:31    


 


BUN  15 mg/dL (6-20)   03/15/19  05:31    


 


Creatinine  0.9 mg/dL (0.6-1.2)   03/15/19  05:31    


 


Estimated GFR (MDRD)  82  (>89)  L  03/15/19  05:31    


 


Glucose  93 mg/dL ()   03/15/19  05:31    


 


POC Whole Bld Glucose  70 mg/dL (70 - 100)   03/09/19  11:34    


 


Lactic Acid  1.0 mmol/L (0.5-2.2)   03/10/19  09:15    


 


Calcium  7.9 mg/dL (8.5-10.3)  L  03/15/19  05:31    


 


Phosphorus  1.7 mg/dL (2.5-4.6)  L  03/12/19  06:07    


 


Total Bilirubin  0.6 mg/dL (0.2-1.0)   03/09/19  13:37    


 


AST  30 IU/L (10-42)   03/09/19  13:37    


 


ALT  14 IU/L (10-60)   03/09/19  13:37    


 


Alkaline Phosphatase  98 IU/L ()   03/09/19  13:37    


 


Total Creatine Kinase  122 IU/L ()   03/06/19  08:37    


 


Troponin I  < 0.04 ng/mL (<0.49)   03/09/19  13:37    


 


Total Protein  6.6 g/dL (6.7-8.2)  L  03/09/19  13:37    


 


Albumin  2.7 g/dL (3.2-5.5)  L  03/12/19  06:07    


 


Globulin  3.2 g/dL (2.1-4.2)   03/09/19  13:37    


 


Albumin/Globulin Ratio  1.1  (1.0-2.2)   03/09/19  13:37    


 


Lipase  30 U/L (22-51)   03/09/19  13:37    


 


Urine Color  YELLOW   03/08/19  18:29    


 


Urine Clarity  CLEAR  (CLEAR)   03/08/19  18:29    


 


Urine pH  6.0 PH (5.0-7.5)   03/08/19  18:29    


 


Ur Specific Gravity  1.025  (1.002-1.030)   03/08/19  18:29    


 


Urine Protein  NEGATIVE mg/dL (NEGATIVE)   03/08/19  18:29    


 


Urine Glucose (UA)  NEGATIVE mg/dL (NEGATIVE)   03/08/19  18:29    


 


Urine Ketones  15 mg/dL (NEGATIVE)  H  03/08/19  18:29    


 


Urine Occult Blood  SMALL  (NEGATIVE)  H  03/08/19  18:29    


 


Urine Nitrite  NEGATIVE  (NEGATIVE)   03/08/19  18:29    


 


Urine Bilirubin  NEGATIVE  (NEGATIVE)   03/08/19  18:29    


 


Urine Urobilinogen  0.2 (NORMAL) E.U./dL (NORMAL)   03/08/19  18:29    


 


Ur Leukocyte Esterase  NEGATIVE  (NEGATIVE)   03/08/19  18:29    


 


Urine RBC  6-10 /HPF (0-5)  H  03/08/19  18:29    


 


Urine WBC  0-3 /HPF (0-3)   03/08/19  18:29    


 


Ur Squamous Epith Cells  RARE Squamous  (<= Few)   03/08/19  18:29    


 


Urine Bacteria  Rare /HPF (None Seen)   03/08/19  18:29    


 


Urine Mucus  Few Strands   03/08/19  18:29    


 


Ur Microscopic Review  INDICATED   03/08/19  18:29    


 


Urine Culture Comments  NOT INDICATED   03/08/19  18:29    


 


Last Dose Date  UNK   03/12/19  09:30    


 


Last Dose Time  UNK   03/12/19  09:30    


 


Vancomycin Trough  17.4 ug/mL (10.0-20.0)   03/12/19  09:30    


 


Urine Opiates Screen  NEGATIVE  (NEGATIVE)   03/04/19  00:00    


 


Ur Oxycodone Screen  NEGATIVE  (NEGATIVE)   03/04/19  00:00    


 


Urine Methadone Screen  NEGATIVE  (NEGATIVE)   03/04/19  00:00    


 


Ur Propoxyphene Screen  NEGATIVE  (NEGATIVE)   03/04/19  00:00    


 


Ur Barbiturates Screen  NEGATIVE  (NEGATIVE)   03/04/19  00:00    


 


Ur Tricyclics Screen  NEGATIVE  (NEGATIVE)   03/04/19  00:00    


 


Ur Phencyclidine Scrn  NEGATIVE  (NEGATIVE)   03/04/19  00:00    


 


Ur Amphetamine Screen  NEGATIVE  (NEGATIVE)   03/04/19  00:00    


 


U Methamphetamines Scrn  NEGATIVE  (NEGATIVE)   03/04/19  00:00    


 


U Benzodiazepines Scrn  NEGATIVE  (NEGATIVE)   03/04/19  00:00    


 


Urine Cocaine Screen  NEGATIVE  (NEGATIVE)   03/04/19  00:00    


 


U Cannabinoids Screen  NEGATIVE  (NEGATIVE)   03/04/19  00:00    


 


C. difficile Tox B Gene  NEGATIVE  (NEGATIVE)   03/10/19  07:43    














Sepsis Event Note (H)





- Evaluation


Current Stage of Sepsis: Ruled out

## 2019-03-29 RX ADMIN — Medication SCH TAB: at 09:28

## 2019-03-29 RX ADMIN — ENOXAPARIN SODIUM SCH MG: 100 INJECTION SUBCUTANEOUS at 09:35

## 2019-03-29 RX ADMIN — FAMOTIDINE SCH MG: 20 TABLET, FILM COATED ORAL at 20:07

## 2019-03-29 RX ADMIN — MEMANTINE HYDROCHLORIDE SCH MG: 5 TABLET ORAL at 20:07

## 2019-03-29 RX ADMIN — FAMOTIDINE SCH MG: 20 TABLET, FILM COATED ORAL at 09:28

## 2019-03-29 RX ADMIN — MEMANTINE HYDROCHLORIDE SCH MG: 5 TABLET ORAL at 09:28

## 2019-03-29 RX ADMIN — POLYETHYLENE GLYCOL 3350 SCH GM: 17 POWDER, FOR SOLUTION ORAL at 09:28

## 2019-03-29 NOTE — PROVIDER PROGRESS NOTE
Assessment/Plan





- Problem List


(1) Dementia


Qualifiers: 


   Dementia type: unspecified type   Dementia behavioral disturbance: without 

behavioral disturbance   Qualified Code(s): F03.90 - Unspecified dementia 

without behavioral disturbance   


Assessment/Plan: 


Unchanged








(2) NPH (normal pressure hydrocephalus)


Assessment/Plan: 


Unchanged








(3) Problem situation relating to social and personal history


Assessment/Plan: 


Unchanged








- Current Meds


Current Meds: 





                               Current Medications











Generic Name Dose Route Start Last Admin





  Trade Name Freq  PRN Reason Stop Dose Admin


 


Acetaminophen  650 mg  03/10/19 11:56  03/23/19 09:57





  Tylenol  PO   650 mg





  Q4HR PRN   Administration





  Pain 1 to 4   





     





     





     


 


Enoxaparin Sodium  40 mg  03/11/19 09:00  03/29/19 09:35





  Lovenox  SUBQ   40 mg





  DAILY JAISON   Administration





     





     





     





     


 


Famotidine  20 mg  03/10/19 21:00  03/29/19 09:28





  Pepcid  PO   20 mg





  BID JAISON   Administration





     





     





     





     


 


Memantine  5 mg  03/10/19 21:00  03/29/19 09:28





  Namenda  PO   5 mg





  BID JAISON   Administration





     





     





     





     


 


Multivitamins/Minerals  1 tab  03/11/19 16:00  03/29/19 09:28





  Theragran  M  PO   1 tab





  DAILYWM JAISON   Administration





     





     





     





     


 


Polyethylene Glycol  17 gm  03/11/19 09:00  03/29/19 09:28





  Miralax  PO   17 gm





  DAILY JAISON   Administration





     





     





     





     


 


Trazodone HCl  50 mg  03/11/19 21:44  03/28/19 20:02





  Desyrel  PO   50 mg





  QPM PRN   Administration





  Insomnia   





     





     





     














- Lab Result


Fish Bone Diagrams: 


                                 03/14/19 04:20





                                 03/15/19 05:31





Subjective





- Subjective


Patient Reports: No Complaints





Objective


Vital Signs: 





                               Vital Signs - 24 hr











  03/29/19





  09:00


 


Temperature 36.4 C L


 


Heart Rate [ 70





Brachial] 


 


Respiratory 16





Rate 


 


Blood Pressure 108/62





[Right Brachial 





artery] 


 


O2 Saturation 94








                                     Oxygen











O2 Source                      Room air














I&O (Last 24 Hrs): 





                          Intake and Output Totals x24h











 03/27/19 03/28/19 03/29/19





 23:59 23:59 23:59


 


Intake Total 1190 1040 


 


Balance 1190 1040 











Respiratory: No respiratory distress





- Results


Results: 





                               Laboratory Results











WBC  2.8 x10^3/uL (4.8-10.8)  L  03/14/19  04:20    


 


RBC  3.29 10^6/uL (4.70-6.10)  L  03/14/19  04:20    


 


Hgb  11.5 g/dL (14.0-18.0)  L  03/14/19  04:20    


 


Hct  33.4 % (42.0-52.0)  L  03/14/19  04:20    


 


MCV  101.5 fL (80.0-94.0)  H  03/14/19  04:20    


 


MCH  35.0 pg (27.0-31.0)  H  03/14/19  04:20    


 


MCHC  34.5 g/dL (32.0-36.0)   03/14/19  04:20    


 


RDW  15.6 % (12.0-15.0)  H  03/14/19  04:20    


 


Plt Count  113 10^3/uL (130-450)  L  03/14/19  04:20    


 


MPV  9.2 fL (7.4-11.4)   03/14/19  04:20    


 


Neut # (Auto)  1.5 10^3/uL (1.5-6.6)   03/14/19  04:20    


 


Lymph # (Auto)  0.8 10^3/uL (1.5-3.5)  L  03/14/19  04:20    


 


Mono # (Auto)  0.5 10^3/uL (0.0-1.0)   03/14/19  04:20    


 


Eos # (Auto)  0.0 10^3/uL (0.0-0.7)   03/14/19  04:20    


 


Baso # (Auto)  0.0 10^3/uL (0.0-0.1)   03/14/19  04:20    


 


Absolute Nucleated RBC  0.00 x10^3/uL  03/14/19  04:20    


 


Total Counted  100   03/13/19  05:05    


 


Band Neuts % (Manual)  0 % (0-10)  03/13/19  05:05    


 


Reactive Lymphs % (Man)  6 %  03/06/19  08:37    


 


Abnorm Lymph % (Manual)  0 %  03/13/19  05:05    


 


Nucleated RBC %  0.1 /100WBC  03/14/19  04:20    


 


Neutrophils # (Manual)  1.5 10^3/uL (1.5-6.6)   03/13/19  05:05    


 


Lymphocytes # (Manual)  0.6 10^3/uL (1.5-3.5)  L  03/13/19  05:05    


 


Monocytes # (Manual)  0.3 10^3/uL (0.0-1.0)   03/13/19  05:05    


 


Eosinophils # (Manual)  0.0 10^3/uL (0-0.7)   03/13/19  05:05    


 


Basophils # (Manual)  0.0 10^3/uL (0-0.1)   03/13/19  05:05    


 


Differential Comment  MANUAL DIFFERENTIAL   03/06/19  08:37    


 


Manual Slide Review  Indicated   03/14/19  04:20    


 


Platelet Estimate  DECREASED (<130,000)  (NORMAL)   03/14/19  04:20    


 


Platelet Morphology  RARE GIANT PLATELETS  (NORMAL)   03/06/19  08:37    


 


RBC Morph Micro Appear  1+ ANISOCYTOSIS  (NORMAL)   03/14/19  04:20    


 


Sodium  139 mmol/L (135-145)   03/15/19  05:31    


 


Potassium  3.6 mmol/L (3.5-5.0)   03/15/19  05:31    


 


Chloride  105 mmol/L (101-111)   03/15/19  05:31    


 


Carbon Dioxide  26 mmol/L (21-32)   03/15/19  05:31    


 


Anion Gap  8.0  (6-13)   03/15/19  05:31    


 


BUN  15 mg/dL (6-20)   03/15/19  05:31    


 


Creatinine  0.9 mg/dL (0.6-1.2)   03/15/19  05:31    


 


Estimated GFR (MDRD)  82  (>89)  L  03/15/19  05:31    


 


Glucose  93 mg/dL ()   03/15/19  05:31    


 


POC Whole Bld Glucose  70 mg/dL (70 - 100)   03/09/19  11:34    


 


Lactic Acid  1.0 mmol/L (0.5-2.2)   03/10/19  09:15    


 


Calcium  7.9 mg/dL (8.5-10.3)  L  03/15/19  05:31    


 


Phosphorus  1.7 mg/dL (2.5-4.6)  L  03/12/19  06:07    


 


Total Bilirubin  0.6 mg/dL (0.2-1.0)   03/09/19  13:37    


 


AST  30 IU/L (10-42)   03/09/19  13:37    


 


ALT  14 IU/L (10-60)   03/09/19  13:37    


 


Alkaline Phosphatase  98 IU/L ()   03/09/19  13:37    


 


Total Creatine Kinase  122 IU/L ()   03/06/19  08:37    


 


Troponin I  < 0.04 ng/mL (<0.49)   03/09/19  13:37    


 


Total Protein  6.6 g/dL (6.7-8.2)  L  03/09/19  13:37    


 


Albumin  2.7 g/dL (3.2-5.5)  L  03/12/19  06:07    


 


Globulin  3.2 g/dL (2.1-4.2)   03/09/19  13:37    


 


Albumin/Globulin Ratio  1.1  (1.0-2.2)   03/09/19  13:37    


 


Lipase  30 U/L (22-51)   03/09/19  13:37    


 


Urine Color  YELLOW   03/08/19  18:29    


 


Urine Clarity  CLEAR  (CLEAR)   03/08/19  18:29    


 


Urine pH  6.0 PH (5.0-7.5)   03/08/19  18:29    


 


Ur Specific Gravity  1.025  (1.002-1.030)   03/08/19  18:29    


 


Urine Protein  NEGATIVE mg/dL (NEGATIVE)   03/08/19  18:29    


 


Urine Glucose (UA)  NEGATIVE mg/dL (NEGATIVE)   03/08/19  18:29    


 


Urine Ketones  15 mg/dL (NEGATIVE)  H  03/08/19  18:29    


 


Urine Occult Blood  SMALL  (NEGATIVE)  H  03/08/19  18:29    


 


Urine Nitrite  NEGATIVE  (NEGATIVE)   03/08/19  18:29    


 


Urine Bilirubin  NEGATIVE  (NEGATIVE)   03/08/19  18:29    


 


Urine Urobilinogen  0.2 (NORMAL) E.U./dL (NORMAL)   03/08/19  18:29    


 


Ur Leukocyte Esterase  NEGATIVE  (NEGATIVE)   03/08/19  18:29    


 


Urine RBC  6-10 /HPF (0-5)  H  03/08/19  18:29    


 


Urine WBC  0-3 /HPF (0-3)   03/08/19  18:29    


 


Ur Squamous Epith Cells  RARE Squamous  (<= Few)   03/08/19  18:29    


 


Urine Bacteria  Rare /HPF (None Seen)   03/08/19  18:29    


 


Urine Mucus  Few Strands   03/08/19  18:29    


 


Ur Microscopic Review  INDICATED   03/08/19  18:29    


 


Urine Culture Comments  NOT INDICATED   03/08/19  18:29    


 


Last Dose Date  UNK   03/12/19  09:30    


 


Last Dose Time  UNK   03/12/19  09:30    


 


Vancomycin Trough  17.4 ug/mL (10.0-20.0)   03/12/19  09:30    


 


Urine Opiates Screen  NEGATIVE  (NEGATIVE)   03/04/19  00:00    


 


Ur Oxycodone Screen  NEGATIVE  (NEGATIVE)   03/04/19  00:00    


 


Urine Methadone Screen  NEGATIVE  (NEGATIVE)   03/04/19  00:00    


 


Ur Propoxyphene Screen  NEGATIVE  (NEGATIVE)   03/04/19  00:00    


 


Ur Barbiturates Screen  NEGATIVE  (NEGATIVE)   03/04/19  00:00    


 


Ur Tricyclics Screen  NEGATIVE  (NEGATIVE)   03/04/19  00:00    


 


Ur Phencyclidine Scrn  NEGATIVE  (NEGATIVE)   03/04/19  00:00    


 


Ur Amphetamine Screen  NEGATIVE  (NEGATIVE)   03/04/19  00:00    


 


U Methamphetamines Scrn  NEGATIVE  (NEGATIVE)   03/04/19  00:00    


 


U Benzodiazepines Scrn  NEGATIVE  (NEGATIVE)   03/04/19  00:00    


 


Urine Cocaine Screen  NEGATIVE  (NEGATIVE)   03/04/19  00:00    


 


U Cannabinoids Screen  NEGATIVE  (NEGATIVE)   03/04/19  00:00    


 


C. difficile Tox B Gene  NEGATIVE  (NEGATIVE)   03/10/19  07:43    














Sepsis Event Note (H)





- Evaluation


Current Stage of Sepsis: Ruled out

## 2019-03-30 RX ADMIN — MEMANTINE HYDROCHLORIDE SCH MG: 5 TABLET ORAL at 09:42

## 2019-03-30 RX ADMIN — POLYETHYLENE GLYCOL 3350 SCH GM: 17 POWDER, FOR SOLUTION ORAL at 09:42

## 2019-03-30 RX ADMIN — Medication SCH TAB: at 09:42

## 2019-03-30 RX ADMIN — MEMANTINE HYDROCHLORIDE SCH MG: 5 TABLET ORAL at 20:42

## 2019-03-30 RX ADMIN — ENOXAPARIN SODIUM SCH MG: 100 INJECTION SUBCUTANEOUS at 09:42

## 2019-03-30 RX ADMIN — FAMOTIDINE SCH MG: 20 TABLET, FILM COATED ORAL at 09:42

## 2019-03-30 RX ADMIN — FAMOTIDINE SCH MG: 20 TABLET, FILM COATED ORAL at 20:42

## 2019-03-30 NOTE — PROVIDER PROGRESS NOTE
Assessment/Plan





- Problem List


(1) Dementia


Qualifiers: 


   Dementia type: unspecified type   Dementia behavioral disturbance: without 

behavioral disturbance   Qualified Code(s): F03.90 - Unspecified dementia 

without behavioral disturbance   


Assessment/Plan: 


Unchanged








(2) NPH (normal pressure hydrocephalus)


Assessment/Plan: 


Unchanged clinical status








(3) Problem situation relating to social and personal history


Assessment/Plan: 


No news regarding [place] [facility] [place]








- Current Meds


Current Meds: 





                               Current Medications











Generic Name Dose Route Start Last Admin





  Trade Name Freq  PRN Reason Stop Dose Admin


 


Acetaminophen  650 mg  03/10/19 11:56  03/23/19 09:57





  Tylenol  PO   650 mg





  Q4HR PRN   Administration





  Pain 1 to 4   





     





     





     


 


Enoxaparin Sodium  40 mg  03/11/19 09:00  03/30/19 09:42





  Lovenox  SUBQ   40 mg





  DAILY JAISON   Administration





     





     





     





     


 


Famotidine  20 mg  03/10/19 21:00  03/30/19 09:42





  Pepcid  PO   20 mg





  BID JAISON   Administration





     





     





     





     


 


Memantine  5 mg  03/10/19 21:00  03/30/19 09:42





  Namenda  PO   5 mg





  BID JAISON   Administration





     





     





     





     


 


Multivitamins/Minerals  1 tab  03/11/19 16:00  03/30/19 09:42





  Theragran  M  PO   1 tab





  DAILYWM JAISON   Administration





     





     





     





     


 


Polyethylene Glycol  17 gm  03/11/19 09:00  03/30/19 09:42





  Miralax  PO   17 gm





  DAILY JAISON   Administration





     





     





     





     


 


Trazodone HCl  50 mg  03/11/19 21:44  03/29/19 20:07





  Desyrel  PO   50 mg





  QPM PRN   Administration





  Insomnia   





     





     





     














- Lab Result


Fish Bone Diagrams: 


                                 03/14/19 04:20





                                 03/15/19 05:31





Subjective





- Subjective


Patient Reports: No Complaints





Objective


Vital Signs: 





                               Vital Signs - 24 hr











  03/30/19





  09:00


 


Temperature 36.4 C L


 


Heart Rate [ 61





Brachial] 


 


Respiratory 16





Rate 


 


Blood Pressure 146/83 H





[Right Brachial 





artery] 


 


O2 Saturation 95








                                     Oxygen











O2 Source                      Room air














I&O (Last 24 Hrs): 





                          Intake and Output Totals x24h











 03/28/19 03/29/19 03/30/19





 23:59 23:59 23:59


 


Intake Total 1040 420 490


 


Output Total   100


 


Balance 1040 420 390











General: Alert


Respiratory: No respiratory distress





- Results


Results: 





                               Laboratory Results











WBC  2.8 x10^3/uL (4.8-10.8)  L  03/14/19  04:20    


 


RBC  3.29 10^6/uL (4.70-6.10)  L  03/14/19  04:20    


 


Hgb  11.5 g/dL (14.0-18.0)  L  03/14/19  04:20    


 


Hct  33.4 % (42.0-52.0)  L  03/14/19  04:20    


 


MCV  101.5 fL (80.0-94.0)  H  03/14/19  04:20    


 


MCH  35.0 pg (27.0-31.0)  H  03/14/19  04:20    


 


MCHC  34.5 g/dL (32.0-36.0)   03/14/19  04:20    


 


RDW  15.6 % (12.0-15.0)  H  03/14/19  04:20    


 


Plt Count  113 10^3/uL (130-450)  L  03/14/19  04:20    


 


MPV  9.2 fL (7.4-11.4)   03/14/19  04:20    


 


Neut # (Auto)  1.5 10^3/uL (1.5-6.6)   03/14/19  04:20    


 


Lymph # (Auto)  0.8 10^3/uL (1.5-3.5)  L  03/14/19  04:20    


 


Mono # (Auto)  0.5 10^3/uL (0.0-1.0)   03/14/19  04:20    


 


Eos # (Auto)  0.0 10^3/uL (0.0-0.7)   03/14/19  04:20    


 


Baso # (Auto)  0.0 10^3/uL (0.0-0.1)   03/14/19  04:20    


 


Absolute Nucleated RBC  0.00 x10^3/uL  03/14/19  04:20    


 


Total Counted  100   03/13/19  05:05    


 


Band Neuts % (Manual)  0 % (0-10)  03/13/19  05:05    


 


Reactive Lymphs % (Man)  6 %  03/06/19  08:37    


 


Abnorm Lymph % (Manual)  0 %  03/13/19  05:05    


 


Nucleated RBC %  0.1 /100WBC  03/14/19  04:20    


 


Neutrophils # (Manual)  1.5 10^3/uL (1.5-6.6)   03/13/19  05:05    


 


Lymphocytes # (Manual)  0.6 10^3/uL (1.5-3.5)  L  03/13/19  05:05    


 


Monocytes # (Manual)  0.3 10^3/uL (0.0-1.0)   03/13/19  05:05    


 


Eosinophils # (Manual)  0.0 10^3/uL (0-0.7)   03/13/19  05:05    


 


Basophils # (Manual)  0.0 10^3/uL (0-0.1)   03/13/19  05:05    


 


Differential Comment  MANUAL DIFFERENTIAL   03/06/19  08:37    


 


Manual Slide Review  Indicated   03/14/19  04:20    


 


Platelet Estimate  DECREASED (<130,000)  (NORMAL)   03/14/19  04:20    


 


Platelet Morphology  RARE GIANT PLATELETS  (NORMAL)   03/06/19  08:37    


 


RBC Morph Micro Appear  1+ ANISOCYTOSIS  (NORMAL)   03/14/19  04:20    


 


Sodium  139 mmol/L (135-145)   03/15/19  05:31    


 


Potassium  3.6 mmol/L (3.5-5.0)   03/15/19  05:31    


 


Chloride  105 mmol/L (101-111)   03/15/19  05:31    


 


Carbon Dioxide  26 mmol/L (21-32)   03/15/19  05:31    


 


Anion Gap  8.0  (6-13)   03/15/19  05:31    


 


BUN  15 mg/dL (6-20)   03/15/19  05:31    


 


Creatinine  0.9 mg/dL (0.6-1.2)   03/15/19  05:31    


 


Estimated GFR (MDRD)  82  (>89)  L  03/15/19  05:31    


 


Glucose  93 mg/dL ()   03/15/19  05:31    


 


POC Whole Bld Glucose  70 mg/dL (70 - 100)   03/09/19  11:34    


 


Lactic Acid  1.0 mmol/L (0.5-2.2)   03/10/19  09:15    


 


Calcium  7.9 mg/dL (8.5-10.3)  L  03/15/19  05:31    


 


Phosphorus  1.7 mg/dL (2.5-4.6)  L  03/12/19  06:07    


 


Total Bilirubin  0.6 mg/dL (0.2-1.0)   03/09/19  13:37    


 


AST  30 IU/L (10-42)   03/09/19  13:37    


 


ALT  14 IU/L (10-60)   03/09/19  13:37    


 


Alkaline Phosphatase  98 IU/L ()   03/09/19  13:37    


 


Total Creatine Kinase  122 IU/L ()   03/06/19  08:37    


 


Troponin I  < 0.04 ng/mL (<0.49)   03/09/19  13:37    


 


Total Protein  6.6 g/dL (6.7-8.2)  L  03/09/19  13:37    


 


Albumin  2.7 g/dL (3.2-5.5)  L  03/12/19  06:07    


 


Globulin  3.2 g/dL (2.1-4.2)   03/09/19  13:37    


 


Albumin/Globulin Ratio  1.1  (1.0-2.2)   03/09/19  13:37    


 


Lipase  30 U/L (22-51)   03/09/19  13:37    


 


Urine Color  YELLOW   03/08/19  18:29    


 


Urine Clarity  CLEAR  (CLEAR)   03/08/19  18:29    


 


Urine pH  6.0 PH (5.0-7.5)   03/08/19  18:29    


 


Ur Specific Gravity  1.025  (1.002-1.030)   03/08/19  18:29    


 


Urine Protein  NEGATIVE mg/dL (NEGATIVE)   03/08/19  18:29    


 


Urine Glucose (UA)  NEGATIVE mg/dL (NEGATIVE)   03/08/19  18:29    


 


Urine Ketones  15 mg/dL (NEGATIVE)  H  03/08/19  18:29    


 


Urine Occult Blood  SMALL  (NEGATIVE)  H  03/08/19  18:29    


 


Urine Nitrite  NEGATIVE  (NEGATIVE)   03/08/19  18:29    


 


Urine Bilirubin  NEGATIVE  (NEGATIVE)   03/08/19  18:29    


 


Urine Urobilinogen  0.2 (NORMAL) E.U./dL (NORMAL)   03/08/19  18:29    


 


Ur Leukocyte Esterase  NEGATIVE  (NEGATIVE)   03/08/19  18:29    


 


Urine RBC  6-10 /HPF (0-5)  H  03/08/19  18:29    


 


Urine WBC  0-3 /HPF (0-3)   03/08/19  18:29    


 


Ur Squamous Epith Cells  RARE Squamous  (<= Few)   03/08/19  18:29    


 


Urine Bacteria  Rare /HPF (None Seen)   03/08/19  18:29    


 


Urine Mucus  Few Strands   03/08/19  18:29    


 


Ur Microscopic Review  INDICATED   03/08/19  18:29    


 


Urine Culture Comments  NOT INDICATED   03/08/19  18:29    


 


Last Dose Date  UNK   03/12/19  09:30    


 


Last Dose Time  UNK   03/12/19  09:30    


 


Vancomycin Trough  17.4 ug/mL (10.0-20.0)   03/12/19  09:30    


 


Urine Opiates Screen  NEGATIVE  (NEGATIVE)   03/04/19  00:00    


 


Ur Oxycodone Screen  NEGATIVE  (NEGATIVE)   03/04/19  00:00    


 


Urine Methadone Screen  NEGATIVE  (NEGATIVE)   03/04/19  00:00    


 


Ur Propoxyphene Screen  NEGATIVE  (NEGATIVE)   03/04/19  00:00    


 


Ur Barbiturates Screen  NEGATIVE  (NEGATIVE)   03/04/19  00:00    


 


Ur Tricyclics Screen  NEGATIVE  (NEGATIVE)   03/04/19  00:00    


 


Ur Phencyclidine Scrn  NEGATIVE  (NEGATIVE)   03/04/19  00:00    


 


Ur Amphetamine Screen  NEGATIVE  (NEGATIVE)   03/04/19  00:00    


 


U Methamphetamines Scrn  NEGATIVE  (NEGATIVE)   03/04/19  00:00    


 


U Benzodiazepines Scrn  NEGATIVE  (NEGATIVE)   03/04/19  00:00    


 


Urine Cocaine Screen  NEGATIVE  (NEGATIVE)   03/04/19  00:00    


 


U Cannabinoids Screen  NEGATIVE  (NEGATIVE)   03/04/19  00:00    


 


C. difficile Tox B Gene  NEGATIVE  (NEGATIVE)   03/10/19  07:43    














Sepsis Event Note (H)





- Evaluation


Current Stage of Sepsis: Ruled out

## 2019-03-31 RX ADMIN — POLYETHYLENE GLYCOL 3350 SCH GM: 17 POWDER, FOR SOLUTION ORAL at 09:00

## 2019-03-31 RX ADMIN — ACETAMINOPHEN PRN MG: 325 TABLET ORAL at 18:55

## 2019-03-31 RX ADMIN — FAMOTIDINE SCH MG: 20 TABLET, FILM COATED ORAL at 08:58

## 2019-03-31 RX ADMIN — ENOXAPARIN SODIUM SCH MG: 100 INJECTION SUBCUTANEOUS at 09:01

## 2019-03-31 RX ADMIN — MEMANTINE HYDROCHLORIDE SCH MG: 5 TABLET ORAL at 08:58

## 2019-03-31 RX ADMIN — FAMOTIDINE SCH MG: 20 TABLET, FILM COATED ORAL at 20:02

## 2019-03-31 RX ADMIN — ACETAMINOPHEN PRN MG: 325 TABLET ORAL at 10:26

## 2019-03-31 RX ADMIN — Medication SCH TAB: at 08:58

## 2019-03-31 RX ADMIN — MEMANTINE HYDROCHLORIDE SCH MG: 5 TABLET ORAL at 20:02

## 2019-03-31 NOTE — PROVIDER PROGRESS NOTE
Assessment/Plan





- Problem List


(1) Dementia


Qualifiers: 


   Dementia type: unspecified type   Dementia behavioral disturbance: without 

behavioral disturbance   Qualified Code(s): F03.90 - Unspecified dementia 

without behavioral disturbance   


Assessment/Plan: 


Continue clueing and reorientation. While he is loud and can get upset, can be 

calmed but low, slow voice and repetitive prompting. 


Continue Namenda, which was started at admission.











(2) NPH (normal pressure hydrocephalus)


Assessment/Plan: 


He is not a shunt candidate, since it is too far progressed.








(3) Problem situation relating to social and personal history


Assessment/Plan: 


He has no family (except 1 brother who cannot be located), so no legal DPOA.


He requires placement, can no longer function independently in a senior 

apartment.


Social Workers are working on placement for him. They have called NUMEROUS 

facilities and the problem is no legal guardianship for this patient who cannot 

make his own decisions.


Attestation: The patient is expected to be transferred to another facility 

within 96 hours.  








- Current Meds


Current Meds: 





                               Current Medications











Generic Name Dose Route Start Last Admin





  Trade Name Freq  PRN Reason Stop Dose Admin


 


Acetaminophen  650 mg  03/10/19 11:56  03/23/19 09:57





  Tylenol  PO   650 mg





  Q4HR PRN   Administration





  Pain 1 to 4   





     





     





     


 


Enoxaparin Sodium  40 mg  03/11/19 09:00  03/30/19 09:42





  Lovenox  SUBQ   40 mg





  DAILY JAISON   Administration





     





     





     





     


 


Famotidine  20 mg  03/10/19 21:00  03/30/19 20:42





  Pepcid  PO   20 mg





  BID JAISON   Administration





     





     





     





     


 


Memantine  5 mg  03/10/19 21:00  03/30/19 20:42





  Namenda  PO   5 mg





  BID JAISON   Administration





     





     





     





     


 


Multivitamins/Minerals  1 tab  03/11/19 16:00  03/30/19 09:42





  Theragran  M  PO   1 tab





  DAILYWM JAISON   Administration





     





     





     





     


 


Polyethylene Glycol  17 gm  03/11/19 09:00  03/30/19 09:42





  Miralax  PO   17 gm





  DAILY JAISON   Administration





     





     





     





     


 


Trazodone HCl  50 mg  03/11/19 21:44  03/30/19 20:42





  Desyrel  PO   50 mg





  QPM PRN   Administration





  Insomnia   





     





     





     














- Lab Result


Fish Bone Diagrams: 


                                 03/14/19 04:20





                                 03/15/19 05:31





Subjective





- Subjective


Patient Reports: No Complaints





Objective


Vital Signs: 





                               Vital Signs - 24 hr











  03/30/19





  09:00


 


Temperature 36.4 C L


 


Heart Rate [ 61





Brachial] 


 


Respiratory 16





Rate 


 


Blood Pressure 146/83 H





[Right Brachial 





artery] 


 


O2 Saturation 95








                                     Oxygen











O2 Source                      Room air














I&O (Last 24 Hrs): 





                          Intake and Output Totals x24h











 03/29/19 03/30/19 03/31/19





 23:59 23:59 23:59


 


Intake Total 420 850 


 


Output Total  100 


 


Balance 420 750 











General: Alert


HEENT: Mucous membr. moist/pink


Neck: Supple


Neuro: Disoriented, Other (Poor gait)


Cardiovascular: Regular rate


Respiratory: No respiratory distress


Abdomen: Soft


Extremities: No edema





- Results


Results: 





                               Laboratory Results











WBC  2.8 x10^3/uL (4.8-10.8)  L  03/14/19  04:20    


 


RBC  3.29 10^6/uL (4.70-6.10)  L  03/14/19  04:20    


 


Hgb  11.5 g/dL (14.0-18.0)  L  03/14/19  04:20    


 


Hct  33.4 % (42.0-52.0)  L  03/14/19  04:20    


 


MCV  101.5 fL (80.0-94.0)  H  03/14/19  04:20    


 


MCH  35.0 pg (27.0-31.0)  H  03/14/19  04:20    


 


MCHC  34.5 g/dL (32.0-36.0)   03/14/19  04:20    


 


RDW  15.6 % (12.0-15.0)  H  03/14/19  04:20    


 


Plt Count  113 10^3/uL (130-450)  L  03/14/19  04:20    


 


MPV  9.2 fL (7.4-11.4)   03/14/19  04:20    


 


Neut # (Auto)  1.5 10^3/uL (1.5-6.6)   03/14/19  04:20    


 


Lymph # (Auto)  0.8 10^3/uL (1.5-3.5)  L  03/14/19  04:20    


 


Mono # (Auto)  0.5 10^3/uL (0.0-1.0)   03/14/19  04:20    


 


Eos # (Auto)  0.0 10^3/uL (0.0-0.7)   03/14/19  04:20    


 


Baso # (Auto)  0.0 10^3/uL (0.0-0.1)   03/14/19  04:20    


 


Absolute Nucleated RBC  0.00 x10^3/uL  03/14/19  04:20    


 


Total Counted  100   03/13/19  05:05    


 


Band Neuts % (Manual)  0 % (0-10)  03/13/19  05:05    


 


Reactive Lymphs % (Man)  6 %  03/06/19  08:37    


 


Abnorm Lymph % (Manual)  0 %  03/13/19  05:05    


 


Nucleated RBC %  0.1 /100WBC  03/14/19  04:20    


 


Neutrophils # (Manual)  1.5 10^3/uL (1.5-6.6)   03/13/19  05:05    


 


Lymphocytes # (Manual)  0.6 10^3/uL (1.5-3.5)  L  03/13/19  05:05    


 


Monocytes # (Manual)  0.3 10^3/uL (0.0-1.0)   03/13/19  05:05    


 


Eosinophils # (Manual)  0.0 10^3/uL (0-0.7)   03/13/19  05:05    


 


Basophils # (Manual)  0.0 10^3/uL (0-0.1)   03/13/19  05:05    


 


Differential Comment  MANUAL DIFFERENTIAL   03/06/19  08:37    


 


Manual Slide Review  Indicated   03/14/19  04:20    


 


Platelet Estimate  DECREASED (<130,000)  (NORMAL)   03/14/19  04:20    


 


Platelet Morphology  RARE GIANT PLATELETS  (NORMAL)   03/06/19  08:37    


 


RBC Morph Micro Appear  1+ ANISOCYTOSIS  (NORMAL)   03/14/19  04:20    


 


Sodium  139 mmol/L (135-145)   03/15/19  05:31    


 


Potassium  3.6 mmol/L (3.5-5.0)   03/15/19  05:31    


 


Chloride  105 mmol/L (101-111)   03/15/19  05:31    


 


Carbon Dioxide  26 mmol/L (21-32)   03/15/19  05:31    


 


Anion Gap  8.0  (6-13)   03/15/19  05:31    


 


BUN  15 mg/dL (6-20)   03/15/19  05:31    


 


Creatinine  0.9 mg/dL (0.6-1.2)   03/15/19  05:31    


 


Estimated GFR (MDRD)  82  (>89)  L  03/15/19  05:31    


 


Glucose  93 mg/dL ()   03/15/19  05:31    


 


POC Whole Bld Glucose  70 mg/dL (70 - 100)   03/09/19  11:34    


 


Lactic Acid  1.0 mmol/L (0.5-2.2)   03/10/19  09:15    


 


Calcium  7.9 mg/dL (8.5-10.3)  L  03/15/19  05:31    


 


Phosphorus  1.7 mg/dL (2.5-4.6)  L  03/12/19  06:07    


 


Total Bilirubin  0.6 mg/dL (0.2-1.0)   03/09/19  13:37    


 


AST  30 IU/L (10-42)   03/09/19  13:37    


 


ALT  14 IU/L (10-60)   03/09/19  13:37    


 


Alkaline Phosphatase  98 IU/L ()   03/09/19  13:37    


 


Total Creatine Kinase  122 IU/L ()   03/06/19  08:37    


 


Troponin I  < 0.04 ng/mL (<0.49)   03/09/19  13:37    


 


Total Protein  6.6 g/dL (6.7-8.2)  L  03/09/19  13:37    


 


Albumin  2.7 g/dL (3.2-5.5)  L  03/12/19  06:07    


 


Globulin  3.2 g/dL (2.1-4.2)   03/09/19  13:37    


 


Albumin/Globulin Ratio  1.1  (1.0-2.2)   03/09/19  13:37    


 


Lipase  30 U/L (22-51)   03/09/19  13:37    


 


Urine Color  YELLOW   03/08/19  18:29    


 


Urine Clarity  CLEAR  (CLEAR)   03/08/19  18:29    


 


Urine pH  6.0 PH (5.0-7.5)   03/08/19  18:29    


 


Ur Specific Gravity  1.025  (1.002-1.030)   03/08/19  18:29    


 


Urine Protein  NEGATIVE mg/dL (NEGATIVE)   03/08/19  18:29    


 


Urine Glucose (UA)  NEGATIVE mg/dL (NEGATIVE)   03/08/19  18:29    


 


Urine Ketones  15 mg/dL (NEGATIVE)  H  03/08/19  18:29    


 


Urine Occult Blood  SMALL  (NEGATIVE)  H  03/08/19  18:29    


 


Urine Nitrite  NEGATIVE  (NEGATIVE)   03/08/19  18:29    


 


Urine Bilirubin  NEGATIVE  (NEGATIVE)   03/08/19  18:29    


 


Urine Urobilinogen  0.2 (NORMAL) E.U./dL (NORMAL)   03/08/19  18:29    


 


Ur Leukocyte Esterase  NEGATIVE  (NEGATIVE)   03/08/19  18:29    


 


Urine RBC  6-10 /HPF (0-5)  H  03/08/19  18:29    


 


Urine WBC  0-3 /HPF (0-3)   03/08/19  18:29    


 


Ur Squamous Epith Cells  RARE Squamous  (<= Few)   03/08/19  18:29    


 


Urine Bacteria  Rare /HPF (None Seen)   03/08/19  18:29    


 


Urine Mucus  Few Strands   03/08/19  18:29    


 


Ur Microscopic Review  INDICATED   03/08/19  18:29    


 


Urine Culture Comments  NOT INDICATED   03/08/19  18:29    


 


Last Dose Date  UNK   03/12/19  09:30    


 


Last Dose Time  UNK   03/12/19  09:30    


 


Vancomycin Trough  17.4 ug/mL (10.0-20.0)   03/12/19  09:30    


 


Urine Opiates Screen  NEGATIVE  (NEGATIVE)   03/04/19  00:00    


 


Ur Oxycodone Screen  NEGATIVE  (NEGATIVE)   03/04/19  00:00    


 


Urine Methadone Screen  NEGATIVE  (NEGATIVE)   03/04/19  00:00    


 


Ur Propoxyphene Screen  NEGATIVE  (NEGATIVE)   03/04/19  00:00    


 


Ur Barbiturates Screen  NEGATIVE  (NEGATIVE)   03/04/19  00:00    


 


Ur Tricyclics Screen  NEGATIVE  (NEGATIVE)   03/04/19  00:00    


 


Ur Phencyclidine Scrn  NEGATIVE  (NEGATIVE)   03/04/19  00:00    


 


Ur Amphetamine Screen  NEGATIVE  (NEGATIVE)   03/04/19  00:00    


 


U Methamphetamines Scrn  NEGATIVE  (NEGATIVE)   03/04/19  00:00    


 


U Benzodiazepines Scrn  NEGATIVE  (NEGATIVE)   03/04/19  00:00    


 


Urine Cocaine Screen  NEGATIVE  (NEGATIVE)   03/04/19  00:00    


 


U Cannabinoids Screen  NEGATIVE  (NEGATIVE)   03/04/19  00:00    


 


C. difficile Tox B Gene  NEGATIVE  (NEGATIVE)   03/10/19  07:43    














Sepsis Event Note (H)





- Evaluation


Current Stage of Sepsis: Ruled out

## 2019-04-01 RX ADMIN — ENOXAPARIN SODIUM SCH MG: 100 INJECTION SUBCUTANEOUS at 09:34

## 2019-04-01 RX ADMIN — MEMANTINE HYDROCHLORIDE SCH MG: 5 TABLET ORAL at 20:06

## 2019-04-01 RX ADMIN — FAMOTIDINE SCH MG: 20 TABLET, FILM COATED ORAL at 09:34

## 2019-04-01 RX ADMIN — Medication SCH TAB: at 09:34

## 2019-04-01 RX ADMIN — MEMANTINE HYDROCHLORIDE SCH MG: 5 TABLET ORAL at 09:34

## 2019-04-01 RX ADMIN — FAMOTIDINE SCH MG: 20 TABLET, FILM COATED ORAL at 20:06

## 2019-04-01 RX ADMIN — POLYETHYLENE GLYCOL 3350 SCH GM: 17 POWDER, FOR SOLUTION ORAL at 09:35

## 2019-04-01 NOTE — MISCELLANEOUS PROVIDER NOTE
Miscellaneous Provider Note





- -


Note: 


S: Patient seen at bedside with no complaints other than a occasional cough with

no sputum production.  Denies fevers chills nausea vomiting emesis GI or  

symptoms.





O: On examination vital signs hemodynamically stable, afebrile, heart rate 67 

bpm, blood pressure 117/72, respiratory rate 16.  Pulse ox 94% O2 saturation on 

room air


General: Patient is alert and oriented x2 unable to state time.  Baseline 

Alzheimer's dementia.


HEENT: Multiple facial fractures with healed contusions.  Pupils equal round 

react light and accommodation extraocular muscles are bilateral intact next


Neck: No JVD no bruits no lymphadenopathy next para CV lungs S1-S2 within normal

limits.  RRR.


Abdomen: Soft nontender nondistended positive bowel sounds all quads no HSM 


Extremities and skin multiple contusions to the face no edema clubbing or 

cyanosis to upper or lower extremities 


Neuro: No psychomotor retardation cranial nerves II to XII grossly intact 

baseline Alzheimer's dementia.


 


Labs reviewed


Imaging: reviewed





Assessment/Plan





- Problem List


(1) Dementia, Alzheimer's type


Qualifiers: 


   Dementia type: unspecified type   Dementia behavioral disturbance: without 

behavioral disturbance   Qualified Code(s): F03.90 - Unspecified dementia 

without behavioral disturbance   


Assessment/Plan: 


We will continue with medical management and avoid any triggers that would 

precipitate behavioral disturbance.


Continue Namenda, which was started at admission.





(2) NPH (normal pressure hydrocephalus)


Assessment/Plan: 


This would explain patient's multiple falls however, He is not a shunt 

candidate, since it is too far progressed.





(3) Pancytopenia: Unclear of etiology however patient has had recent infection 

and perhaps iatrogenic cause from prior HCAP. 





(4) Problem situation relating to social and personal history


Assessment/Plan: 


He has no family (except 1 brother who cannot be located), so no legal DPOA.


He requires placement, can no longer function independently in a senior 

apartment.


Social Workers are working on placement for him. They have called NUMEROUS 

facilities and the problem is no legal guardianship for this patient who cannot 

make his own decisions.


Attestation: The patient is expected to be transferred to another facility 

within 96 hours.  





Continue with DVT/GI prophylaxis.

## 2019-04-02 RX ADMIN — ENOXAPARIN SODIUM SCH: 100 INJECTION SUBCUTANEOUS at 09:03

## 2019-04-02 RX ADMIN — MEMANTINE HYDROCHLORIDE SCH MG: 5 TABLET ORAL at 20:21

## 2019-04-02 RX ADMIN — FAMOTIDINE SCH MG: 20 TABLET, FILM COATED ORAL at 09:03

## 2019-04-02 RX ADMIN — POLYETHYLENE GLYCOL 3350 SCH: 17 POWDER, FOR SOLUTION ORAL at 09:03

## 2019-04-02 RX ADMIN — FAMOTIDINE SCH MG: 20 TABLET, FILM COATED ORAL at 20:21

## 2019-04-02 RX ADMIN — MEMANTINE HYDROCHLORIDE SCH MG: 5 TABLET ORAL at 09:03

## 2019-04-02 RX ADMIN — Medication SCH TAB: at 09:03

## 2019-04-02 NOTE — MISCELLANEOUS PROVIDER NOTE
Miscellaneous Provider Note





- -


Note: 


Subjective: Patient seen at bedside with no acute complaints.  Currently 

awaiting placement.  Guardianship legal process initiated.





Objective: On examination vital signs hemodynamically stable. 





General: Patient is alert and oriented x2 unable to state time.  Baseline 

Alzheimer's dementia.


HEENT: Multiple facial fractures with healed contusions.  Pupils equal round 

react light and accommodation extraocular muscles are bilateral intact next


Neck: No JVD no bruits no lymphadenopathy next para CV lungs S1-S2 within normal

limits.  RRR.


Abdomen: Soft nontender nondistended positive bowel sounds all quads no HSM 


Extremities and skin multiple contusions to the face no edema clubbing or 

cyanosis to upper or lower extremities 


Neuro: No psychomotor retardation cranial nerves II to XII grossly intact 

baseline Alzheimer's dementia.


 


Labs reviewed


Imaging: reviewed





Assessment/Plan





- Problem List


(1) Dementia, Alzheimer's type


Qualifiers: 


   Dementia type: unspecified type   Dementia behavioral disturbance: without 

behavioral disturbance   Qualified Code(s): F03.90 - Unspecified dementia 

without behavioral disturbance   


Assessment/Plan: 


We will continue with medical management and avoid any triggers that would 

precipitate behavioral disturbance.


Continue Namenda, which was started at admission. Patient at the present time is

a social placement issue with guardianship legal process that has been initiated

already.  We will continue to follow  recommendations and further u

pdates.





(2) NPH (normal pressure hydrocephalus)


Assessment/Plan: 


This would explain patient's multiple falls however, He is not a shunt 

candidate, since it is too far progressed.





(3) Pancytopenia: Unclear of etiology however patient has had recent infection 

and perhaps iatrogenic cause from prior HCAP. 





(4) Problem situation relating to social and personal history


Assessment/Plan: 


Patient at the present time is a social placement issue with guardianship legal 

process that has been initiated already.  We will continue to follow social 

worker recommendations and further updates.


He has no family (except 1 brother who cannot be located), so no legal DPOA.


He requires placement, can no longer function independently in a senior 

apartment.


Social Workers are working on placement for him. They have called NUMEROUS 

facilities and the problem is no legal guardianship for this patient who cannot 

make his own decisions.


Attestation: The patient is expected To continue to stay more than 96 hours as a

result of patient awaiting placement which is dependent on the guardianship 

legal process which has already been initiated by social work.








Continue with DVT/GI prophylaxis.

## 2019-04-03 RX ADMIN — FAMOTIDINE SCH MG: 20 TABLET, FILM COATED ORAL at 21:14

## 2019-04-03 RX ADMIN — MEMANTINE HYDROCHLORIDE SCH MG: 5 TABLET ORAL at 08:33

## 2019-04-03 RX ADMIN — Medication SCH TAB: at 08:33

## 2019-04-03 RX ADMIN — POLYETHYLENE GLYCOL 3350 SCH: 17 POWDER, FOR SOLUTION ORAL at 08:33

## 2019-04-03 RX ADMIN — MEMANTINE HYDROCHLORIDE SCH MG: 5 TABLET ORAL at 21:14

## 2019-04-03 RX ADMIN — FAMOTIDINE SCH MG: 20 TABLET, FILM COATED ORAL at 08:33

## 2019-04-03 RX ADMIN — ENOXAPARIN SODIUM SCH MG: 100 INJECTION SUBCUTANEOUS at 08:33

## 2019-04-03 NOTE — MISCELLANEOUS PROVIDER NOTE
Miscellaneous Provider Note





- -


Note: 


Subjective: Patient seen at bedside with no acute complaints.  Currently 

awaiting placement.  Guardianship legal process initiated.





Objective: On examination vital signs hemodynamically stable. Up in chair eating

breakfast.





General: Patient is alert and oriented x2 unable to state time.  Baseline 

Alzheimer's dementia.


HEENT: Multiple facial fractures with healed contusions.  Pupils equal round 

react light and accommodation extraocular muscles are bilateral intact next


Neck: No JVD no bruits no lymphadenopathy next para CV lungs S1-S2 within normal

limits.  RRR.


Abdomen: Soft nontender nondistended positive bowel sounds all quads no HSM 


Extremities and skin multiple contusions to the face no edema clubbing or 

cyanosis to upper or lower extremities 


Neuro: No psychomotor retardation cranial nerves II to XII grossly intact 

baseline Alzheimer's dementia.


 


Labs reviewed


Imaging: reviewed





Assessment/Plan





- Problem List


(1) Dementia, Alzheimer's type


Qualifiers: 


   Dementia type: unspecified type   Dementia behavioral disturbance: without 

behavioral disturbance   Qualified Code(s): F03.90 - Unspecified dementia w

ithout behavioral disturbance   


Assessment/Plan: 


We will continue with medical management and avoid any triggers that would 

precipitate behavioral disturbance.


Continue Namenda, which was started at admission. Patient at the present time is

a social placement issue with guardianship legal process that has been initiated

already.  We will continue to follow  recommendations and further 

updates.





(2) NPH (normal pressure hydrocephalus)


Assessment/Plan: 


This would explain patient's multiple falls however, He is not a shunt 

candidate, since it is too far progressed.





(3) Pancytopenia: Unclear of etiology however patient has had recent infection 

and perhaps iatrogenic cause from prior HCAP. 





(4) Problem situation relating to social and personal history


Assessment/Plan: 


Patient at the present time is a social placement issue with guardianship legal 

process that has been initiated already.  We will continue to follow social 

worker recommendations and further updates.


He has no family (except 1 brother who cannot be located), so no legal DPOA.


He requires placement, can no longer function independently in a senior 

apartment.


Social Workers are working on placement for him. They have called NUMEROUS 

facilities and the problem is no legal guardianship for this patient who cannot 

make his own decisions.





[facility] had requested that  approve  's request to work 

with needed outside  on the pursuit of guardianship for this patient. 

has just approved request, so this process will be officially pursued from this 

point forward. 





Attestation: The patient is expected To continue to stay more than 96 hours as a

result of patient awaiting placement which is dependent on the guardianship 

legal process which has already been initiated by social work.








Continue with DVT/GI prophylaxis.

## 2019-04-04 RX ADMIN — FAMOTIDINE SCH MG: 20 TABLET, FILM COATED ORAL at 08:18

## 2019-04-04 RX ADMIN — POLYETHYLENE GLYCOL 3350 SCH GM: 17 POWDER, FOR SOLUTION ORAL at 08:18

## 2019-04-04 RX ADMIN — MEMANTINE HYDROCHLORIDE SCH MG: 5 TABLET ORAL at 21:12

## 2019-04-04 RX ADMIN — MEMANTINE HYDROCHLORIDE SCH MG: 5 TABLET ORAL at 08:18

## 2019-04-04 RX ADMIN — FAMOTIDINE SCH MG: 20 TABLET, FILM COATED ORAL at 21:12

## 2019-04-04 RX ADMIN — Medication SCH TAB: at 08:18

## 2019-04-04 RX ADMIN — ENOXAPARIN SODIUM SCH: 100 INJECTION SUBCUTANEOUS at 08:19

## 2019-04-04 NOTE — MISCELLANEOUS PROVIDER NOTE
Miscellaneous Provider Note





- -


Note: 


Subjective: Patient seen at bedside with no new events.  Patient tolerating diet

well.  Patient denies any symptoms.





Objective: On examination vital signs hemodynamically stable. Communicates and 

verbalizes needs well.





General: Patient is alert and oriented x2 unable to state time.  Baseline 

Alzheimer's dementia.


HEENT: Multiple facial fractures with healed contusions.  Pupils equal round 

react light and accommodation extraocular muscles are bilateral intact next


Neck: No JVD no bruits no lymphadenopathy next para CV lungs S1-S2 within normal

limits.  RRR.


Abdomen: Soft nontender nondistended positive bowel sounds all quads no HSM 


Extremities and skin multiple contusions to the face no edema clubbing or 

cyanosis to upper or lower extremities 


Neuro: No psychomotor retardation cranial nerves II to XII grossly intact 

baseline Alzheimer's dementia.


 


Labs reviewed


Imaging: reviewed





Assessment/Plan





- Problem List


(1) Dementia, Alzheimer's type


Qualifiers: 


   Dementia type: unspecified type   Dementia behavioral disturbance: without 

behavioral disturbance   Qualified Code(s): F03.90 - Unspecified dementia withou

t behavioral disturbance   


Assessment/Plan: 


We will continue with medical management and avoid any triggers that would 

precipitate behavioral disturbance.


Continue Namenda, which was started at admission. Patient at the present time is

a social placement issue with guardianship legal process that has been initiated

already.  We will continue to follow  recommendations and further 

updates.





(2) NPH (normal pressure hydrocephalus)


Assessment/Plan: 


This would explain patient's multiple falls however, He is not a shunt 

candidate, since it is too far progressed.





(3) Pancytopenia: Unclear of etiology however patient has had recent infection 

and perhaps iatrogenic cause from prior HCAP. 





(4) Problem situation relating to social and personal history


Assessment/Plan: 


Patient at the present time is a social placement issue with guardianship legal 

process that has been initiated already.  We will continue to follow social 

worker recommendations and further updates.


He has no family (except 1 brother who cannot be located), so no legal DPOA.


He requires placement, can no longer function independently in a senior 

apartment.


Social Workers are working on placement for him. They have called NUMEROUS 

facilities and the problem is no legal guardianship for this patient who cannot 

make his own decisions.





[employer] had requested that [facility] [place]  's request to work 

with needed outside  on the pursuit of guardianship for this patient. 

has just approved request, so this process will be officially pursued from this 

point forward. 





Attestation: The patient is expected To continue to stay more than 96 hours as a

result of patient awaiting placement which is dependent on the guardianship 

legal process which has already been initiated by social work.








Continue with DVT/GI prophylaxis.

## 2019-04-05 LAB
BASOPHILS NFR BLD AUTO: 0.1 10^3/UL (ref 0–0.1)
BASOPHILS NFR BLD AUTO: 1.3 %
EOSINOPHIL # BLD AUTO: 0.5 10^3/UL (ref 0–0.7)
EOSINOPHIL NFR BLD AUTO: 8.3 %
ERYTHROCYTE [DISTWIDTH] IN BLOOD BY AUTOMATED COUNT: 14.9 % (ref 12–15)
HGB UR QL STRIP: 12.1 G/DL (ref 14–18)
INR PPP: 1.1 (ref 0.8–1.2)
LYMPHOCYTES # SPEC AUTO: 1 10^3/UL (ref 1.5–3.5)
LYMPHOCYTES NFR BLD AUTO: 17.2 %
MCH RBC QN AUTO: 34.2 PG (ref 27–31)
MCHC RBC AUTO-ENTMCNC: 33.2 G/DL (ref 32–36)
MCV RBC AUTO: 103 FL (ref 80–94)
MONOCYTES # BLD AUTO: 0.6 10^3/UL (ref 0–1)
MONOCYTES NFR BLD AUTO: 11.5 %
NEUTROPHILS # BLD AUTO: 3.4 10^3/UL (ref 1.5–6.6)
NEUTROPHILS # SNV AUTO: 5.5 X10^3/UL (ref 4.8–10.8)
NEUTROPHILS NFR BLD AUTO: 61.7 %
PDW BLD AUTO: 7.9 FL (ref 7.4–11.4)
PLATELET # BLD: 216 10^3/UL (ref 130–450)
PROTHROM ACT/NOR PPP: 12.7 SECS (ref 9.9–12.6)
RBC MAR: 3.55 10^6/UL (ref 4.7–6.1)

## 2019-04-05 RX ADMIN — MEMANTINE HYDROCHLORIDE SCH MG: 5 TABLET ORAL at 21:19

## 2019-04-05 RX ADMIN — ENOXAPARIN SODIUM SCH: 100 INJECTION SUBCUTANEOUS at 08:53

## 2019-04-05 RX ADMIN — FAMOTIDINE SCH MG: 20 TABLET, FILM COATED ORAL at 08:52

## 2019-04-05 RX ADMIN — POLYETHYLENE GLYCOL 3350 SCH GM: 17 POWDER, FOR SOLUTION ORAL at 08:52

## 2019-04-05 RX ADMIN — Medication SCH TAB: at 08:52

## 2019-04-05 RX ADMIN — FAMOTIDINE SCH MG: 20 TABLET, FILM COATED ORAL at 21:19

## 2019-04-05 RX ADMIN — MEMANTINE HYDROCHLORIDE SCH MG: 5 TABLET ORAL at 08:52

## 2019-04-05 NOTE — MISCELLANEOUS PROVIDER NOTE
Miscellaneous Provider Note





- -


Note: 


Subjective: Patient seen at bedside with no new events.Patient had a 

uncomplicated bleeding episode of his left side of nose for which bandage was 

pulled off and he was bleeding which was controlled with replacing bandage on 

nose.





Objective: On examination vital signs hemodynamically stable. Communicates and 

verbalizes needs well.





General: Patient is alert and oriented x2 unable to state time.  Baseline 

Alzheimer's dementia.


HEENT: Multiple facial fractures with healed contusions. Nose left aspect with 

visible blood eschar and no bleeding. Pupils equal round react light and 

accommodation extraocular muscles are bilateral intact next


Neck: No JVD no bruits no lymphadenopathy next para CV lungs S1-S2 within normal

limits.  RRR.


Abdomen: Soft nontender nondistended positive bowel sounds all quads no HSM 


Extremities and skin multiple contusions to the face no edema clubbing or 

cyanosis to upper or lower extremities 


Neuro: No psychomotor retardation cranial nerves II to XII grossly intact 

baseline Alzheimer's dementia.


 


Labs reviewed


Imaging: reviewed





Assessment/Plan





- Problem List


(1) Dementia, Alzheimer's type


Qualifiers: 


   Dementia type: unspecified type   Dementia behavioral disturbance: without 

behavioral disturbance   Qualified Code(s): F03.90 - Unspecified dementia 

without behavioral disturbance   


Assessment/Plan: 


We will continue with medical management and avoid any triggers that would 

precipitate behavioral disturbance.


Continue Namenda, which was started at admission. Patient at the present time is

a social placement issue with guardianship legal process that has been initiated

already.  We will continue to follow  recommendations and further 

updates.





(2) NPH (normal pressure hydrocephalus)


Assessment/Plan: 


This would explain patient's multiple falls however, He is not a shunt ca

ndidate, since it is too far progressed.





(3) Pancytopenia: Unclear of etiology however patient has had recent infection 

and perhaps iatrogenic cause from prior HCAP. We will discontinue Lovenox.  

Obtain a most recent CBC. 





(4) Problem situation relating to social and personal history


Assessment/Plan: 


Patient at the present time is a social placement issue with guardianship legal 

process that has been initiated already.  We will continue to follow social 

worker recommendations and further updates.


He has no family (except 1 brother who cannot be located), so no legal DPOA.


He requires placement, can no longer function independently in a senior 

apartment.


Social Workers are working on placement for him. They have called NUMEROUS 

facilities and the problem is no legal guardianship for this patient who cannot 

make his own decisions.





DVT prophylaxis with SCD boots only.  Discontinue Lovenox.  GI prophylaxis to [facility].





Social Work had requested that  approve  's request to work 

with needed outside  on the pursuit of guardianship for this patient. [facility] [place] [facility]

has just approved request, so this process will be officially pursued from this 

point forward. 





Attestation: The patient is expected To continue to stay more than 96 hours as a

result of patient awaiting placement which is dependent on the guardianship 

legal process which has already been initiated by social work.








Continue with DVT/GI prophylaxis.

## 2019-04-06 RX ADMIN — POLYETHYLENE GLYCOL 3350 SCH GM: 17 POWDER, FOR SOLUTION ORAL at 09:31

## 2019-04-06 RX ADMIN — MEMANTINE HYDROCHLORIDE SCH MG: 5 TABLET ORAL at 09:32

## 2019-04-06 RX ADMIN — MEMANTINE HYDROCHLORIDE SCH MG: 5 TABLET ORAL at 21:04

## 2019-04-06 RX ADMIN — Medication SCH TAB: at 09:32

## 2019-04-06 RX ADMIN — FAMOTIDINE SCH MG: 20 TABLET, FILM COATED ORAL at 09:32

## 2019-04-06 RX ADMIN — FAMOTIDINE SCH MG: 20 TABLET, FILM COATED ORAL at 21:04

## 2019-04-06 NOTE — MISCELLANEOUS PROVIDER NOTE
Miscellaneous Provider Note





- -


Note: 


Subjective: Patient seen at bedside with no acute events.  Denies fevers chills 

nausea vomiting headaches dizziness GI or  symptoms.





Objective: On examination vital signs hemodynamically stable. Communicates and 

verbalizes needs well.


General: Patient is alert and oriented x2 unable to state time.  Baseline 

Alzheimer's dementia.


HEENT: Multiple facial fractures with healed contusions. Nose left aspect with 

visible blood eschar and no bleeding. Pupils equal round react light and 

accommodation extraocular muscles are bilateral intact next


Neck: No JVD no bruits no lymphadenopathy next para CV lungs S1-S2 within normal

limits.  RRR.


Abdomen: Soft nontender nondistended positive bowel sounds all quads no HSM 


Extremities and skin: Multiple contusions to the face With a nose laceration 

that appears to be healing


Neuro: No psychomotor retardation cranial nerves II to XII grossly intact 

baseline Alzheimer's dementia.


 


Labs reviewed


Imaging: reviewed





Assessment/Plan





- Problem List


(1) Dementia, Alzheimer's type


Qualifiers: 


   Dementia type: unspecified type   Dementia behavioral disturbance: without 

behavioral disturbance   Qualified Code(s): F03.90 - Unspecified dementia 

without behavioral disturbance   


Assessment/Plan: 


We will continue with medical management and avoid any triggers that would 

precipitate behavioral disturbance.


Continue Namenda, which was started at admission. Patient at the present time is

a social placement issue with guardianship legal process that has been initiated

already.  We will continue to follow  recommendations and further 

updates.





(2) NPH (normal pressure hydrocephalus)


Assessment/Plan: 


This would explain patient's multiple falls however, He is not a shunt 

candidate, since it is too far progressed.





(3) Pancytopenia: Resolved 


-Repeat CBC is unremarkable.  INR 1.1 Unclear of etiology however patient has 

had recent infection and perhaps iatrogenic cause from prior HCAP. Currently off

of Lovenox.





(4) Problem situation relating to social and personal history


Assessment/Plan: 


Patient at the present time is a social placement issue with guardianship legal 

process that has been initiated already.  We will continue to follow social 

worker recommendations and further updates.


He has no family (except 1 brother who cannot be located), so no legal DPOA.


He requires placement, can no longer function independently in a senior 

apartment.


Social Workers are working on placement for him. They have called NUMEROUS 

facilities and the problem is no legal guardianship for this patient who cannot 

make his own decisions.





DVT prophylaxis with SCD boots only.  Discontinue Lovenox.  GI prophylaxis to 

continue.





Social Work had requested that  approve  's request to work wi

th needed outside  on the pursuit of guardianship for this patient. [facility] [place], so this process will be officially pursued from this 

point forward. 





Attestation: The patient is expected To continue to stay more than 96 hours as a

result of patient awaiting placement which is dependent on the guardianship 

legal process which has already been initiated by social work.








Continue with DVT/GI prophylaxis.

## 2019-04-07 RX ADMIN — MEMANTINE HYDROCHLORIDE SCH MG: 5 TABLET ORAL at 08:56

## 2019-04-07 RX ADMIN — FAMOTIDINE SCH MG: 20 TABLET, FILM COATED ORAL at 20:58

## 2019-04-07 RX ADMIN — Medication SCH TAB: at 08:56

## 2019-04-07 RX ADMIN — NYSTATIN SCH APPLIC: 100000 POWDER TOPICAL at 20:58

## 2019-04-07 RX ADMIN — POLYETHYLENE GLYCOL 3350 SCH GM: 17 POWDER, FOR SOLUTION ORAL at 08:56

## 2019-04-07 RX ADMIN — MEMANTINE HYDROCHLORIDE SCH MG: 5 TABLET ORAL at 20:58

## 2019-04-07 RX ADMIN — FAMOTIDINE SCH MG: 20 TABLET, FILM COATED ORAL at 08:56

## 2019-04-08 RX ADMIN — NYSTATIN SCH APPLIC: 100000 POWDER TOPICAL at 09:22

## 2019-04-08 RX ADMIN — FAMOTIDINE SCH MG: 20 TABLET, FILM COATED ORAL at 20:14

## 2019-04-08 RX ADMIN — POLYETHYLENE GLYCOL 3350 SCH: 17 POWDER, FOR SOLUTION ORAL at 09:22

## 2019-04-08 RX ADMIN — NYSTATIN SCH APPLIC: 100000 POWDER TOPICAL at 20:14

## 2019-04-08 RX ADMIN — Medication SCH TAB: at 09:22

## 2019-04-08 RX ADMIN — MEMANTINE HYDROCHLORIDE SCH MG: 5 TABLET ORAL at 20:14

## 2019-04-08 RX ADMIN — MEMANTINE HYDROCHLORIDE SCH MG: 5 TABLET ORAL at 09:22

## 2019-04-08 RX ADMIN — FAMOTIDINE SCH MG: 20 TABLET, FILM COATED ORAL at 09:22

## 2019-04-08 NOTE — MISCELLANEOUS PROVIDER NOTE
Miscellaneous Provider Note





- -


Note: 


Subjective: Patient will have wound care to assess patient's bleeding left 

paranasal chronic ulcer. Currently awaiting guardianship appointment





Objective: On examination vital signs hemodynamically stable. Communicates and 

verbalizes needs well.


General: Patient is alert and oriented x2 unable to state time.  Baseline 

Alzheimer's dementia.


HEENT: Multiple facial fractures with healed contusions. Nose left aspect with 

visible blood eschar and no bleeding. Pupils equal round react light and 

accommodation extraocular muscles are bilateral intact next


Neck: No JVD no bruits no lymphadenopathy next para CV lungs S1-S2 within normal

limits.  RRR.


Abdomen: Soft nontender nondistended positive bowel sounds all quads no HSM 


Extremities and skin: Multiple contusions to the face With a nose laceration 

that appears to be healing


Neuro: No psychomotor retardation cranial nerves II to XII grossly intact 

baseline Alzheimer's dementia.


 


Labs reviewed


Imaging: reviewed





Assessment/Plan





- Problem List


(1) Dementia, Alzheimer's type


Qualifiers: 


   Dementia type: unspecified type   Dementia behavioral disturbance: without 

behavioral disturbance   Qualified Code(s): F03.90 - Unspecified dementia 

without behavioral disturbance   


Assessment/Plan: 


We will continue with medical management and avoid any triggers that would 

precipitate behavioral disturbance.


Continue Namenda, which was started at admission. Patient at the present time is

a social placement issue with guardianship legal process that has been initiated

already.  We will continue to follow  recommendations and further 

updates.





(2) NPH (normal pressure hydrocephalus)


Assessment/Plan: 


This would explain patient's multiple falls however, He is not a shunt 

candidate, since it is too far progressed.





(3) Pancytopenia: Resolved 


-Repeat CBC is unremarkable.  INR 1.1 Unclear of etiology however patient has 

had recent infection and perhaps iatrogenic cause from prior HCAP. Currently off

of Lovenox.





(4) Left chronic paranasal chronic ulcer


-We will have wound care assess and possible biopsy as underlying basal cell 

carcinoma or other dermatological cancers cannot be ruled out


-May need outpatient dermatological referral once patient is cleared with 

guardianship





(5) Problem situation relating to social and personal history


Assessment/Plan: 


Patient at the present time is a social placement issue with guardianship legal 

process that has been initiated already.  We will continue to follow social 

worker recommendations and further updates.


He has no family (except 1 brother who cannot be located), so no legal DPOA.


He requires placement, can no longer function independently in a senior 

apartment.


Social Workers are working on placement for him. They have called NUMEROUS 

facilities and the problem is no legal guardianship for this patient who cannot 

make his own decisions.





DVT prophylaxis with SCD boots only.  Due to bleeding Lovenox has since been 

discontinued. GI prophylaxis to continue.





Social Work had requested that  approve  's request to work 

with needed outside  on the pursuit of guardianship for this patient. [facility] [place]

has just approved request, so this process will be officially pursued from this 

point forward. 





Attestation: The patient is expected To continue to stay more than 96 hours as a

result of patient awaiting placement which is dependent on the guardianship 

legal process which has already been initiated by social work.








Continue with DVT/GI prophylaxis.

## 2019-04-09 RX ADMIN — POLYETHYLENE GLYCOL 3350 SCH: 17 POWDER, FOR SOLUTION ORAL at 08:23

## 2019-04-09 RX ADMIN — MEMANTINE HYDROCHLORIDE SCH MG: 5 TABLET ORAL at 08:22

## 2019-04-09 RX ADMIN — NYSTATIN SCH APPLIC: 100000 POWDER TOPICAL at 21:18

## 2019-04-09 RX ADMIN — Medication SCH TAB: at 08:22

## 2019-04-09 RX ADMIN — FAMOTIDINE SCH MG: 20 TABLET, FILM COATED ORAL at 08:22

## 2019-04-09 RX ADMIN — FAMOTIDINE SCH MG: 20 TABLET, FILM COATED ORAL at 21:10

## 2019-04-09 RX ADMIN — MEMANTINE HYDROCHLORIDE SCH MG: 5 TABLET ORAL at 21:10

## 2019-04-09 RX ADMIN — NYSTATIN SCH APPLIC: 100000 POWDER TOPICAL at 08:23

## 2019-04-09 NOTE — PROVIDER PROGRESS NOTE
Assessment/Plan





- Problem List


(1) Dementia


Qualifiers: 


   Dementia type: unspecified type   Dementia behavioral disturbance: without 

behavioral disturbance   Qualified Code(s): F03.90 - Unspecified dementia 

without behavioral disturbance   


Assessment/Plan: 


Continue Namenda








(2) NPH (normal pressure hydrocephalus)


Assessment/Plan: 


Not a surgical c andidate since it is too progressed.








(3) Problem situation relating to social and personal history


Assessment/Plan: 


Guardianship is being worked on, so that placement in a facility can be 

arranged.








- Current Meds


Current Meds: 





                               Current Medications











Generic Name Dose Route Start Last Admin





  Trade Name Freq  PRN Reason Stop Dose Admin


 


Acetaminophen  650 mg  03/10/19 11:56  03/31/19 18:55





  Tylenol  PO   650 mg





  Q4HR PRN   Administration





  Pain 1 to 4   





     





     





     


 


Famotidine  20 mg  03/10/19 21:00  04/09/19 08:22





  Pepcid  PO   20 mg





  BID JAISON   Administration





     





     





     





     


 


Memantine  5 mg  03/10/19 21:00  04/09/19 08:22





  Namenda  PO   5 mg





  BID JAISON   Administration





     





     





     





     


 


Multivitamins/Minerals  1 tab  03/11/19 16:00  04/09/19 08:22





  Theragran  M  PO   1 tab





  DAILYWM JAISON   Administration





     





     





     





     


 


Nystatin  1 applic  04/07/19 21:00  04/09/19 08:23





  Nystop  TOP   1 applic





  BID JAISON   Administration





     





     





     





     


 


Polyethylene Glycol  17 gm  03/11/19 09:00  04/09/19 08:23





  Miralax  PO   Not Given





  DAILY JAISON   





     





     





     





     


 


Trazodone HCl  50 mg  03/11/19 21:44  04/08/19 20:14





  Desyrel  PO   50 mg





  QPM PRN   Administration





  Insomnia   





     





     





     














- Lab Result


Fish Bone Diagrams: 


                                 04/05/19 11:45





                                 03/15/19 05:31





Subjective





- Subjective


Patient Reports: No Complaints





Objective


Vital Signs: 





                                     Oxygen











O2 Source                      Room air














I&O (Last 24 Hrs): 





                          Intake and Output Totals x24h











 04/07/19 04/08/19 04/09/19





 23:59 23:59 23:59


 


Intake Total 1217 1960 720


 


Balance 1217 1960 720











General: Alert


Respiratory: No respiratory distress





- Results


Results: 





                               Laboratory Results











WBC  5.5 x10^3/uL (4.8-10.8)   04/05/19  11:45    


 


RBC  3.55 10^6/uL (4.70-6.10)  L  04/05/19  11:45    


 


Hgb  12.1 g/dL (14.0-18.0)  L  04/05/19  11:45    


 


Hct  36.6 % (42.0-52.0)  L  04/05/19  11:45    


 


MCV  103.0 fL (80.0-94.0)  H  04/05/19  11:45    


 


MCH  34.2 pg (27.0-31.0)  H  04/05/19  11:45    


 


MCHC  33.2 g/dL (32.0-36.0)   04/05/19  11:45    


 


RDW  14.9 % (12.0-15.0)   04/05/19  11:45    


 


Plt Count  216 10^3/uL (130-450)   04/05/19  11:45    


 


MPV  7.9 fL (7.4-11.4)   04/05/19  11:45    


 


Neut # (Auto)  3.4 10^3/uL (1.5-6.6)   04/05/19  11:45    


 


Lymph # (Auto)  1.0 10^3/uL (1.5-3.5)  L  04/05/19  11:45    


 


Mono # (Auto)  0.6 10^3/uL (0.0-1.0)   04/05/19  11:45    


 


Eos # (Auto)  0.5 10^3/uL (0.0-0.7)   04/05/19  11:45    


 


Baso # (Auto)  0.1 10^3/uL (0.0-0.1)   04/05/19  11:45    


 


Absolute Nucleated RBC  0.01 x10^3/uL  04/05/19  11:45    


 


Total Counted  100   03/13/19  05:05    


 


Band Neuts % (Manual)  0 % (0-10)  03/13/19  05:05    


 


Reactive Lymphs % (Man)  6 %  03/06/19  08:37    


 


Abnorm Lymph % (Manual)  0 %  03/13/19  05:05    


 


Nucleated RBC %  0.1 /100WBC  04/05/19  11:45    


 


Neutrophils # (Manual)  1.5 10^3/uL (1.5-6.6)   03/13/19  05:05    


 


Lymphocytes # (Manual)  0.6 10^3/uL (1.5-3.5)  L  03/13/19  05:05    


 


Monocytes # (Manual)  0.3 10^3/uL (0.0-1.0)   03/13/19  05:05    


 


Eosinophils # (Manual)  0.0 10^3/uL (0-0.7)   03/13/19  05:05    


 


Basophils # (Manual)  0.0 10^3/uL (0-0.1)   03/13/19  05:05    


 


Differential Comment  MANUAL DIFFERENTIAL   03/06/19  08:37    


 


Manual Slide Review  Indicated   03/14/19  04:20    


 


Platelet Estimate  DECREASED (<130,000)  (NORMAL)   03/14/19  04:20    


 


Platelet Morphology  RARE GIANT PLATELETS  (NORMAL)   03/06/19  08:37    


 


RBC Morph Micro Appear  1+ ANISOCYTOSIS  (NORMAL)   03/14/19  04:20    


 


PT  12.7 secs (9.9-12.6)  H  04/05/19  11:45    


 


INR  1.1  (0.8-1.2)   04/05/19  11:45    


 


Sodium  139 mmol/L (135-145)   03/15/19  05:31    


 


Potassium  3.6 mmol/L (3.5-5.0)   03/15/19  05:31    


 


Chloride  105 mmol/L (101-111)   03/15/19  05:31    


 


Carbon Dioxide  26 mmol/L (21-32)   03/15/19  05:31    


 


Anion Gap  8.0  (6-13)   03/15/19  05:31    


 


BUN  15 mg/dL (6-20)   03/15/19  05:31    


 


Creatinine  0.9 mg/dL (0.6-1.2)   03/15/19  05:31    


 


Estimated GFR (MDRD)  82  (>89)  L  03/15/19  05:31    


 


Glucose  93 mg/dL ()   03/15/19  05:31    


 


POC Whole Bld Glucose  70 mg/dL (70 - 100)   03/09/19  11:34    


 


Lactic Acid  1.0 mmol/L (0.5-2.2)   03/10/19  09:15    


 


Calcium  7.9 mg/dL (8.5-10.3)  L  03/15/19  05:31    


 


Phosphorus  1.7 mg/dL (2.5-4.6)  L  03/12/19  06:07    


 


Total Bilirubin  0.6 mg/dL (0.2-1.0)   03/09/19  13:37    


 


AST  30 IU/L (10-42)   03/09/19  13:37    


 


ALT  14 IU/L (10-60)   03/09/19  13:37    


 


Alkaline Phosphatase  98 IU/L ()   03/09/19  13:37    


 


Total Creatine Kinase  122 IU/L ()   03/06/19  08:37    


 


Troponin I  < 0.04 ng/mL (<0.49)   03/09/19  13:37    


 


Total Protein  6.6 g/dL (6.7-8.2)  L  03/09/19  13:37    


 


Albumin  2.7 g/dL (3.2-5.5)  L  03/12/19  06:07    


 


Globulin  3.2 g/dL (2.1-4.2)   03/09/19  13:37    


 


Albumin/Globulin Ratio  1.1  (1.0-2.2)   03/09/19  13:37    


 


Lipase  30 U/L (22-51)   03/09/19  13:37    


 


Urine Color  YELLOW   03/08/19  18:29    


 


Urine Clarity  CLEAR  (CLEAR)   03/08/19  18:29    


 


Urine pH  6.0 PH (5.0-7.5)   03/08/19  18:29    


 


Ur Specific Gravity  1.025  (1.002-1.030)   03/08/19  18:29    


 


Urine Protein  NEGATIVE mg/dL (NEGATIVE)   03/08/19  18:29    


 


Urine Glucose (UA)  NEGATIVE mg/dL (NEGATIVE)   03/08/19  18:29    


 


Urine Ketones  15 mg/dL (NEGATIVE)  H  03/08/19  18:29    


 


Urine Occult Blood  SMALL  (NEGATIVE)  H  03/08/19  18:29    


 


Urine Nitrite  NEGATIVE  (NEGATIVE)   03/08/19  18:29    


 


Urine Bilirubin  NEGATIVE  (NEGATIVE)   03/08/19  18:29    


 


Urine Urobilinogen  0.2 (NORMAL) E.U./dL (NORMAL)   03/08/19  18:29    


 


Ur Leukocyte Esterase  NEGATIVE  (NEGATIVE)   03/08/19  18:29    


 


Urine RBC  6-10 /HPF (0-5)  H  03/08/19  18:29    


 


Urine WBC  0-3 /HPF (0-3)   03/08/19  18:29    


 


Ur Squamous Epith Cells  RARE Squamous  (<= Few)   03/08/19  18:29    


 


Urine Bacteria  Rare /HPF (None Seen)   03/08/19  18:29    


 


Urine Mucus  Few Strands   03/08/19  18:29    


 


Ur Microscopic Review  INDICATED   03/08/19  18:29    


 


Urine Culture Comments  NOT INDICATED   03/08/19  18:29    


 


Last Dose Date  UNK   03/12/19  09:30    


 


Last Dose Time  UNK   03/12/19  09:30    


 


Vancomycin Trough  17.4 ug/mL (10.0-20.0)   03/12/19  09:30    


 


Urine Opiates Screen  NEGATIVE  (NEGATIVE)   03/04/19  00:00    


 


Ur Oxycodone Screen  NEGATIVE  (NEGATIVE)   03/04/19  00:00    


 


Urine Methadone Screen  NEGATIVE  (NEGATIVE)   03/04/19  00:00    


 


Ur Propoxyphene Screen  NEGATIVE  (NEGATIVE)   03/04/19  00:00    


 


Ur Barbiturates Screen  NEGATIVE  (NEGATIVE)   03/04/19  00:00    


 


Ur Tricyclics Screen  NEGATIVE  (NEGATIVE)   03/04/19  00:00    


 


Ur Phencyclidine Scrn  NEGATIVE  (NEGATIVE)   03/04/19  00:00    


 


Ur Amphetamine Screen  NEGATIVE  (NEGATIVE)   03/04/19  00:00    


 


U Methamphetamines Scrn  NEGATIVE  (NEGATIVE)   03/04/19  00:00    


 


U Benzodiazepines Scrn  NEGATIVE  (NEGATIVE)   03/04/19  00:00    


 


Urine Cocaine Screen  NEGATIVE  (NEGATIVE)   03/04/19  00:00    


 


U Cannabinoids Screen  NEGATIVE  (NEGATIVE)   03/04/19  00:00    


 


C. difficile Tox B Gene  NEGATIVE  (NEGATIVE)   03/10/19  07:43    














Sepsis Event Note (H)





- Evaluation


Current Stage of Sepsis: Ruled out

## 2019-04-10 RX ADMIN — MEMANTINE HYDROCHLORIDE SCH MG: 5 TABLET ORAL at 20:35

## 2019-04-10 RX ADMIN — FAMOTIDINE SCH MG: 20 TABLET, FILM COATED ORAL at 08:28

## 2019-04-10 RX ADMIN — MEMANTINE HYDROCHLORIDE SCH MG: 5 TABLET ORAL at 08:29

## 2019-04-10 RX ADMIN — NYSTATIN SCH: 100000 POWDER TOPICAL at 20:36

## 2019-04-10 RX ADMIN — NYSTATIN SCH APPLIC: 100000 POWDER TOPICAL at 08:31

## 2019-04-10 RX ADMIN — POLYETHYLENE GLYCOL 3350 SCH GM: 17 POWDER, FOR SOLUTION ORAL at 08:28

## 2019-04-10 RX ADMIN — Medication SCH TAB: at 08:29

## 2019-04-10 RX ADMIN — FAMOTIDINE SCH MG: 20 TABLET, FILM COATED ORAL at 20:35

## 2019-04-10 NOTE — PROVIDER PROGRESS NOTE
Assessment/Plan





- Problem List


(1) Dementia


Qualifiers: 


   Dementia type: unspecified type   Dementia behavioral disturbance: without 

behavioral disturbance   Qualified Code(s): F03.90 - Unspecified dementia 

without behavioral disturbance   


Assessment/Plan: 


Awaiting permanent placement.








(2) NPH (normal pressure hydrocephalus)


Assessment/Plan: 


Awaiting permanent placement.








(3) Problem situation relating to social and personal history


Assessment/Plan: 


The hospital is working on guardianship and  is working on transfer.


Awaiting permanent placement.








- Current Meds


Current Meds: 





                               Current Medications











Generic Name Dose Route Start Last Admin





  Trade Name Freq  PRN Reason Stop Dose Admin


 


Acetaminophen  650 mg  03/10/19 11:56  03/31/19 18:55





  Tylenol  PO   650 mg





  Q4HR PRN   Administration





  Pain 1 to 4   





     





     





     


 


Famotidine  20 mg  03/10/19 21:00  04/10/19 08:28





  Pepcid  PO   20 mg





  BID JAISON   Administration





     





     





     





     


 


Memantine  5 mg  03/10/19 21:00  04/10/19 08:29





  Namenda  PO   5 mg





  BID JAISON   Administration





     





     





     





     


 


Multivitamins/Minerals  1 tab  03/11/19 16:00  04/10/19 08:29





  Theragran  M  PO   1 tab





  DAILYWM JAISON   Administration





     





     





     





     


 


Nystatin  1 applic  04/07/19 21:00  04/10/19 08:31





  Nystop  TOP   1 applic





  BID JAISON   Administration





     





     





     





     


 


Polyethylene Glycol  17 gm  03/11/19 09:00  04/10/19 08:28





  Miralax  PO   17 gm





  DAILY JAISON   Administration





     





     





     





     


 


Trazodone HCl  50 mg  03/11/19 21:44  04/08/19 20:14





  Desyrel  PO   50 mg





  QPM PRN   Administration





  Insomnia   





     





     





     














- Lab Result


Fish Bone Diagrams: 


                                 04/05/19 11:45





                                 03/15/19 05:31





Subjective





- Subjective


Patient Reports: No Complaints





Objective


Vital Signs: 





                               Vital Signs - 24 hr











  04/10/19





  07:49


 


Temperature 36.3 C L


 


Heart Rate [ 73





Brachial] 


 


Respiratory 18





Rate 


 


Blood Pressure 120/65





[Right Brachial 





artery] 


 


O2 Saturation 92








                                     Oxygen











O2 Source                      Room air














I&O (Last 24 Hrs): 





                          Intake and Output Totals x24h











 04/08/19 04/09/19 04/10/19





 23:59 23:59 23:59


 


Intake Total 5589 860 9456


 


Output Total   200


 


Balance 5891 198 2841











Respiratory: No respiratory distress





- Results


Results: 





                               Laboratory Results











WBC  5.5 x10^3/uL (4.8-10.8)   04/05/19  11:45    


 


RBC  3.55 10^6/uL (4.70-6.10)  L  04/05/19  11:45    


 


Hgb  12.1 g/dL (14.0-18.0)  L  04/05/19  11:45    


 


Hct  36.6 % (42.0-52.0)  L  04/05/19  11:45    


 


MCV  103.0 fL (80.0-94.0)  H  04/05/19  11:45    


 


MCH  34.2 pg (27.0-31.0)  H  04/05/19  11:45    


 


MCHC  33.2 g/dL (32.0-36.0)   04/05/19  11:45    


 


RDW  14.9 % (12.0-15.0)   04/05/19  11:45    


 


Plt Count  216 10^3/uL (130-450)   04/05/19  11:45    


 


MPV  7.9 fL (7.4-11.4)   04/05/19  11:45    


 


Neut # (Auto)  3.4 10^3/uL (1.5-6.6)   04/05/19  11:45    


 


Lymph # (Auto)  1.0 10^3/uL (1.5-3.5)  L  04/05/19  11:45    


 


Mono # (Auto)  0.6 10^3/uL (0.0-1.0)   04/05/19  11:45    


 


Eos # (Auto)  0.5 10^3/uL (0.0-0.7)   04/05/19  11:45    


 


Baso # (Auto)  0.1 10^3/uL (0.0-0.1)   04/05/19  11:45    


 


Absolute Nucleated RBC  0.01 x10^3/uL  04/05/19  11:45    


 


Total Counted  100   03/13/19  05:05    


 


Band Neuts % (Manual)  0 % (0-10)  03/13/19  05:05    


 


Reactive Lymphs % (Man)  6 %  03/06/19  08:37    


 


Abnorm Lymph % (Manual)  0 %  03/13/19  05:05    


 


Nucleated RBC %  0.1 /100WBC  04/05/19  11:45    


 


Neutrophils # (Manual)  1.5 10^3/uL (1.5-6.6)   03/13/19  05:05    


 


Lymphocytes # (Manual)  0.6 10^3/uL (1.5-3.5)  L  03/13/19  05:05    


 


Monocytes # (Manual)  0.3 10^3/uL (0.0-1.0)   03/13/19  05:05    


 


Eosinophils # (Manual)  0.0 10^3/uL (0-0.7)   03/13/19  05:05    


 


Basophils # (Manual)  0.0 10^3/uL (0-0.1)   03/13/19  05:05    


 


Differential Comment  MANUAL DIFFERENTIAL   03/06/19  08:37    


 


Manual Slide Review  Indicated   03/14/19  04:20    


 


Platelet Estimate  DECREASED (<130,000)  (NORMAL)   03/14/19  04:20    


 


Platelet Morphology  RARE GIANT PLATELETS  (NORMAL)   03/06/19  08:37    


 


RBC Morph Micro Appear  1+ ANISOCYTOSIS  (NORMAL)   03/14/19  04:20    


 


PT  12.7 secs (9.9-12.6)  H  04/05/19  11:45    


 


INR  1.1  (0.8-1.2)   04/05/19  11:45    


 


Sodium  139 mmol/L (135-145)   03/15/19  05:31    


 


Potassium  3.6 mmol/L (3.5-5.0)   03/15/19  05:31    


 


Chloride  105 mmol/L (101-111)   03/15/19  05:31    


 


Carbon Dioxide  26 mmol/L (21-32)   03/15/19  05:31    


 


Anion Gap  8.0  (6-13)   03/15/19  05:31    


 


BUN  15 mg/dL (6-20)   03/15/19  05:31    


 


Creatinine  0.9 mg/dL (0.6-1.2)   03/15/19  05:31    


 


Estimated GFR (MDRD)  82  (>89)  L  03/15/19  05:31    


 


Glucose  93 mg/dL ()   03/15/19  05:31    


 


POC Whole Bld Glucose  70 mg/dL (70 - 100)   03/09/19  11:34    


 


Lactic Acid  1.0 mmol/L (0.5-2.2)   03/10/19  09:15    


 


Calcium  7.9 mg/dL (8.5-10.3)  L  03/15/19  05:31    


 


Phosphorus  1.7 mg/dL (2.5-4.6)  L  03/12/19  06:07    


 


Total Bilirubin  0.6 mg/dL (0.2-1.0)   03/09/19  13:37    


 


AST  30 IU/L (10-42)   03/09/19  13:37    


 


ALT  14 IU/L (10-60)   03/09/19  13:37    


 


Alkaline Phosphatase  98 IU/L ()   03/09/19  13:37    


 


Total Creatine Kinase  122 IU/L ()   03/06/19  08:37    


 


Troponin I  < 0.04 ng/mL (<0.49)   03/09/19  13:37    


 


Total Protein  6.6 g/dL (6.7-8.2)  L  03/09/19  13:37    


 


Albumin  2.7 g/dL (3.2-5.5)  L  03/12/19  06:07    


 


Globulin  3.2 g/dL (2.1-4.2)   03/09/19  13:37    


 


Albumin/Globulin Ratio  1.1  (1.0-2.2)   03/09/19  13:37    


 


Lipase  30 U/L (22-51)   03/09/19  13:37    


 


Urine Color  YELLOW   03/08/19  18:29    


 


Urine Clarity  CLEAR  (CLEAR)   03/08/19  18:29    


 


Urine pH  6.0 PH (5.0-7.5)   03/08/19  18:29    


 


Ur Specific Gravity  1.025  (1.002-1.030)   03/08/19  18:29    


 


Urine Protein  NEGATIVE mg/dL (NEGATIVE)   03/08/19  18:29    


 


Urine Glucose (UA)  NEGATIVE mg/dL (NEGATIVE)   03/08/19  18:29    


 


Urine Ketones  15 mg/dL (NEGATIVE)  H  03/08/19  18:29    


 


Urine Occult Blood  SMALL  (NEGATIVE)  H  03/08/19  18:29    


 


Urine Nitrite  NEGATIVE  (NEGATIVE)   03/08/19  18:29    


 


Urine Bilirubin  NEGATIVE  (NEGATIVE)   03/08/19  18:29    


 


Urine Urobilinogen  0.2 (NORMAL) E.U./dL (NORMAL)   03/08/19  18:29    


 


Ur Leukocyte Esterase  NEGATIVE  (NEGATIVE)   03/08/19  18:29    


 


Urine RBC  6-10 /HPF (0-5)  H  03/08/19  18:29    


 


Urine WBC  0-3 /HPF (0-3)   03/08/19  18:29    


 


Ur Squamous Epith Cells  RARE Squamous  (<= Few)   03/08/19  18:29    


 


Urine Bacteria  Rare /HPF (None Seen)   03/08/19  18:29    


 


Urine Mucus  Few Strands   03/08/19  18:29    


 


Ur Microscopic Review  INDICATED   03/08/19  18:29    


 


Urine Culture Comments  NOT INDICATED   03/08/19  18:29    


 


Last Dose Date  UNK   03/12/19  09:30    


 


Last Dose Time  UNK   03/12/19  09:30    


 


Vancomycin Trough  17.4 ug/mL (10.0-20.0)   03/12/19  09:30    


 


Urine Opiates Screen  NEGATIVE  (NEGATIVE)   03/04/19  00:00    


 


Ur Oxycodone Screen  NEGATIVE  (NEGATIVE)   03/04/19  00:00    


 


Urine Methadone Screen  NEGATIVE  (NEGATIVE)   03/04/19  00:00    


 


Ur Propoxyphene Screen  NEGATIVE  (NEGATIVE)   03/04/19  00:00    


 


Ur Barbiturates Screen  NEGATIVE  (NEGATIVE)   03/04/19  00:00    


 


Ur Tricyclics Screen  NEGATIVE  (NEGATIVE)   03/04/19  00:00    


 


Ur Phencyclidine Scrn  NEGATIVE  (NEGATIVE)   03/04/19  00:00    


 


Ur Amphetamine Screen  NEGATIVE  (NEGATIVE)   03/04/19  00:00    


 


U Methamphetamines Scrn  NEGATIVE  (NEGATIVE)   03/04/19  00:00    


 


U Benzodiazepines Scrn  NEGATIVE  (NEGATIVE)   03/04/19  00:00    


 


Urine Cocaine Screen  NEGATIVE  (NEGATIVE)   03/04/19  00:00    


 


U Cannabinoids Screen  NEGATIVE  (NEGATIVE)   03/04/19  00:00    


 


C. difficile Tox B Gene  NEGATIVE  (NEGATIVE)   03/10/19  07:43    














Sepsis Event Note (H)





- Evaluation


Current Stage of Sepsis: Ruled out

## 2019-04-11 RX ADMIN — NYSTATIN SCH APPLIC: 100000 POWDER TOPICAL at 20:58

## 2019-04-11 RX ADMIN — MEMANTINE HYDROCHLORIDE SCH MG: 5 TABLET ORAL at 08:51

## 2019-04-11 RX ADMIN — FAMOTIDINE SCH MG: 20 TABLET, FILM COATED ORAL at 08:51

## 2019-04-11 RX ADMIN — NYSTATIN SCH APPLIC: 100000 POWDER TOPICAL at 08:51

## 2019-04-11 RX ADMIN — MEMANTINE HYDROCHLORIDE SCH MG: 5 TABLET ORAL at 20:58

## 2019-04-11 RX ADMIN — FAMOTIDINE SCH MG: 20 TABLET, FILM COATED ORAL at 20:58

## 2019-04-11 RX ADMIN — POLYETHYLENE GLYCOL 3350 SCH GM: 17 POWDER, FOR SOLUTION ORAL at 08:51

## 2019-04-11 RX ADMIN — Medication SCH TAB: at 08:51

## 2019-04-11 NOTE — PROVIDER PROGRESS NOTE
Assessment/Plan





- Problem List


(1) Dementia


Qualifiers: 


   Dementia type: unspecified type   Dementia behavioral disturbance: without 

behavioral disturbance   Qualified Code(s): F03.90 - Unspecified dementia 

without behavioral disturbance   


Assessment/Plan: 


No change








(2) NPH (normal pressure hydrocephalus)


Assessment/Plan: 


No change








(3) Problem situation relating to social and personal history


Assessment/Plan: 


No change.


Awaiting final paperwork regarding guardianship.


Awaiting placement, possibly to [facility], in [place], [place].








- Current Meds


Current Meds: 





                               Current Medications











Generic Name Dose Route Start Last Admin





  Trade Name Freq  PRN Reason Stop Dose Admin


 


Acetaminophen  650 mg  03/10/19 11:56  03/31/19 18:55





  Tylenol  PO   650 mg





  Q4HR PRN   Administration





  Pain 1 to 4   





     





     





     


 


Famotidine  20 mg  03/10/19 21:00  04/11/19 08:51





  Pepcid  PO   20 mg





  BID JAISON   Administration





     





     





     





     


 


Memantine  5 mg  03/10/19 21:00  04/11/19 08:51





  Namenda  PO   5 mg





  BID JAISON   Administration





     





     





     





     


 


Multivitamins/Minerals  1 tab  03/11/19 16:00  04/11/19 08:51





  Theragran  M  PO   1 tab





  DAILYWM JAISON   Administration





     





     





     





     


 


Nystatin  1 applic  04/07/19 21:00  04/11/19 08:51





  Nystop  TOP   1 applic





  BID JAISON   Administration





     





     





     





     


 


Polyethylene Glycol  17 gm  03/11/19 09:00  04/11/19 08:51





  Miralax  PO   17 gm





  DAILY JAISON   Administration





     





     





     





     


 


Trazodone HCl  50 mg  03/11/19 21:44  04/08/19 20:14





  Desyrel  PO   50 mg





  QPM PRN   Administration





  Insomnia   





     





     





     














- Lab Result


Fish Bone Diagrams: 


                                 04/05/19 11:45





                                 03/15/19 05:31





Subjective





- Subjective


Patient Reports: No Complaints





Objective


Vital Signs: 





                               Vital Signs - 24 hr











  04/11/19





  07:34


 


Temperature 36.8 C


 


Heart Rate [ 56 L





Brachial] 


 


Respiratory 16





Rate 


 


Blood Pressure 128/69





[Right Brachial 





artery] 


 


O2 Saturation 95








                                     Oxygen











O2 Source                      Room air














I&O (Last 24 Hrs): 





                          Intake and Output Totals x24h











 04/09/19 04/10/19 04/11/19





 23:59 23:59 23:59


 


Intake Total 960 1830 720


 


Output Total  200 


 


Balance 960 1630 720











General: Cooperative


Respiratory: No respiratory distress





- Results


Results: 





                               Laboratory Results











WBC  5.5 x10^3/uL (4.8-10.8)   04/05/19  11:45    


 


RBC  3.55 10^6/uL (4.70-6.10)  L  04/05/19  11:45    


 


Hgb  12.1 g/dL (14.0-18.0)  L  04/05/19  11:45    


 


Hct  36.6 % (42.0-52.0)  L  04/05/19  11:45    


 


MCV  103.0 fL (80.0-94.0)  H  04/05/19  11:45    


 


MCH  34.2 pg (27.0-31.0)  H  04/05/19  11:45    


 


MCHC  33.2 g/dL (32.0-36.0)   04/05/19  11:45    


 


RDW  14.9 % (12.0-15.0)   04/05/19  11:45    


 


Plt Count  216 10^3/uL (130-450)   04/05/19  11:45    


 


MPV  7.9 fL (7.4-11.4)   04/05/19  11:45    


 


Neut # (Auto)  3.4 10^3/uL (1.5-6.6)   04/05/19  11:45    


 


Lymph # (Auto)  1.0 10^3/uL (1.5-3.5)  L  04/05/19  11:45    


 


Mono # (Auto)  0.6 10^3/uL (0.0-1.0)   04/05/19  11:45    


 


Eos # (Auto)  0.5 10^3/uL (0.0-0.7)   04/05/19  11:45    


 


Baso # (Auto)  0.1 10^3/uL (0.0-0.1)   04/05/19  11:45    


 


Absolute Nucleated RBC  0.01 x10^3/uL  04/05/19  11:45    


 


Total Counted  100   03/13/19  05:05    


 


Band Neuts % (Manual)  0 % (0-10)  03/13/19  05:05    


 


Reactive Lymphs % (Man)  6 %  03/06/19  08:37    


 


Abnorm Lymph % (Manual)  0 %  03/13/19  05:05    


 


Nucleated RBC %  0.1 /100WBC  04/05/19  11:45    


 


Neutrophils # (Manual)  1.5 10^3/uL (1.5-6.6)   03/13/19  05:05    


 


Lymphocytes # (Manual)  0.6 10^3/uL (1.5-3.5)  L  03/13/19  05:05    


 


Monocytes # (Manual)  0.3 10^3/uL (0.0-1.0)   03/13/19  05:05    


 


Eosinophils # (Manual)  0.0 10^3/uL (0-0.7)   03/13/19  05:05    


 


Basophils # (Manual)  0.0 10^3/uL (0-0.1)   03/13/19  05:05    


 


Differential Comment  MANUAL DIFFERENTIAL   03/06/19  08:37    


 


Manual Slide Review  Indicated   03/14/19  04:20    


 


Platelet Estimate  DECREASED (<130,000)  (NORMAL)   03/14/19  04:20    


 


Platelet Morphology  RARE GIANT PLATELETS  (NORMAL)   03/06/19  08:37    


 


RBC Morph Micro Appear  1+ ANISOCYTOSIS  (NORMAL)   03/14/19  04:20    


 


PT  12.7 secs (9.9-12.6)  H  04/05/19  11:45    


 


INR  1.1  (0.8-1.2)   04/05/19  11:45    


 


Sodium  139 mmol/L (135-145)   03/15/19  05:31    


 


Potassium  3.6 mmol/L (3.5-5.0)   03/15/19  05:31    


 


Chloride  105 mmol/L (101-111)   03/15/19  05:31    


 


Carbon Dioxide  26 mmol/L (21-32)   03/15/19  05:31    


 


Anion Gap  8.0  (6-13)   03/15/19  05:31    


 


BUN  15 mg/dL (6-20)   03/15/19  05:31    


 


Creatinine  0.9 mg/dL (0.6-1.2)   03/15/19  05:31    


 


Estimated GFR (MDRD)  82  (>89)  L  03/15/19  05:31    


 


Glucose  93 mg/dL ()   03/15/19  05:31    


 


POC Whole Bld Glucose  70 mg/dL (70 - 100)   03/09/19  11:34    


 


Lactic Acid  1.0 mmol/L (0.5-2.2)   03/10/19  09:15    


 


Calcium  7.9 mg/dL (8.5-10.3)  L  03/15/19  05:31    


 


Phosphorus  1.7 mg/dL (2.5-4.6)  L  03/12/19  06:07    


 


Total Bilirubin  0.6 mg/dL (0.2-1.0)   03/09/19  13:37    


 


AST  30 IU/L (10-42)   03/09/19  13:37    


 


ALT  14 IU/L (10-60)   03/09/19  13:37    


 


Alkaline Phosphatase  98 IU/L ()   03/09/19  13:37    


 


Total Creatine Kinase  122 IU/L ()   03/06/19  08:37    


 


Troponin I  < 0.04 ng/mL (<0.49)   03/09/19  13:37    


 


Total Protein  6.6 g/dL (6.7-8.2)  L  03/09/19  13:37    


 


Albumin  2.7 g/dL (3.2-5.5)  L  03/12/19  06:07    


 


Globulin  3.2 g/dL (2.1-4.2)   03/09/19  13:37    


 


Albumin/Globulin Ratio  1.1  (1.0-2.2)   03/09/19  13:37    


 


Lipase  30 U/L (22-51)   03/09/19  13:37    


 


Urine Color  YELLOW   03/08/19  18:29    


 


Urine Clarity  CLEAR  (CLEAR)   03/08/19  18:29    


 


Urine pH  6.0 PH (5.0-7.5)   03/08/19  18:29    


 


Ur Specific Gravity  1.025  (1.002-1.030)   03/08/19  18:29    


 


Urine Protein  NEGATIVE mg/dL (NEGATIVE)   03/08/19  18:29    


 


Urine Glucose (UA)  NEGATIVE mg/dL (NEGATIVE)   03/08/19  18:29    


 


Urine Ketones  15 mg/dL (NEGATIVE)  H  03/08/19  18:29    


 


Urine Occult Blood  SMALL  (NEGATIVE)  H  03/08/19  18:29    


 


Urine Nitrite  NEGATIVE  (NEGATIVE)   03/08/19  18:29    


 


Urine Bilirubin  NEGATIVE  (NEGATIVE)   03/08/19  18:29    


 


Urine Urobilinogen  0.2 (NORMAL) E.U./dL (NORMAL)   03/08/19  18:29    


 


Ur Leukocyte Esterase  NEGATIVE  (NEGATIVE)   03/08/19  18:29    


 


Urine RBC  6-10 /HPF (0-5)  H  03/08/19  18:29    


 


Urine WBC  0-3 /HPF (0-3)   03/08/19  18:29    


 


Ur Squamous Epith Cells  RARE Squamous  (<= Few)   03/08/19  18:29    


 


Urine Bacteria  Rare /HPF (None Seen)   03/08/19  18:29    


 


Urine Mucus  Few Strands   03/08/19  18:29    


 


Ur Microscopic Review  INDICATED   03/08/19  18:29    


 


Urine Culture Comments  NOT INDICATED   03/08/19  18:29    


 


Last Dose Date  UNK   03/12/19  09:30    


 


Last Dose Time  UNK   03/12/19  09:30    


 


Vancomycin Trough  17.4 ug/mL (10.0-20.0)   03/12/19  09:30    


 


Urine Opiates Screen  NEGATIVE  (NEGATIVE)   03/04/19  00:00    


 


Ur Oxycodone Screen  NEGATIVE  (NEGATIVE)   03/04/19  00:00    


 


Urine Methadone Screen  NEGATIVE  (NEGATIVE)   03/04/19  00:00    


 


Ur Propoxyphene Screen  NEGATIVE  (NEGATIVE)   03/04/19  00:00    


 


Ur Barbiturates Screen  NEGATIVE  (NEGATIVE)   03/04/19  00:00    


 


Ur Tricyclics Screen  NEGATIVE  (NEGATIVE)   03/04/19  00:00    


 


Ur Phencyclidine Scrn  NEGATIVE  (NEGATIVE)   03/04/19  00:00    


 


Ur Amphetamine Screen  NEGATIVE  (NEGATIVE)   03/04/19  00:00    


 


U Methamphetamines Scrn  NEGATIVE  (NEGATIVE)   03/04/19  00:00    


 


U Benzodiazepines Scrn  NEGATIVE  (NEGATIVE)   03/04/19  00:00    


 


Urine Cocaine Screen  NEGATIVE  (NEGATIVE)   03/04/19  00:00    


 


U Cannabinoids Screen  NEGATIVE  (NEGATIVE)   03/04/19  00:00    


 


C. difficile Tox B Gene  NEGATIVE  (NEGATIVE)   03/10/19  07:43    














Sepsis Event Note (H)





- Evaluation


Current Stage of Sepsis: Ruled out

## 2019-04-12 RX ADMIN — NYSTATIN SCH APPLIC: 100000 POWDER TOPICAL at 10:22

## 2019-04-12 RX ADMIN — FAMOTIDINE SCH MG: 20 TABLET, FILM COATED ORAL at 10:21

## 2019-04-12 RX ADMIN — MEMANTINE HYDROCHLORIDE SCH MG: 5 TABLET ORAL at 10:22

## 2019-04-12 RX ADMIN — NYSTATIN SCH: 100000 POWDER TOPICAL at 21:29

## 2019-04-12 RX ADMIN — POLYETHYLENE GLYCOL 3350 SCH GM: 17 POWDER, FOR SOLUTION ORAL at 10:21

## 2019-04-12 RX ADMIN — FAMOTIDINE SCH MG: 20 TABLET, FILM COATED ORAL at 21:28

## 2019-04-12 RX ADMIN — Medication SCH TAB: at 10:22

## 2019-04-12 RX ADMIN — MEMANTINE HYDROCHLORIDE SCH MG: 5 TABLET ORAL at 21:28

## 2019-04-12 NOTE — PROVIDER PROGRESS NOTE
Assessment/Plan





- Problem List


(1) Dementia


Qualifiers: 


   Dementia type: unspecified type   Dementia behavioral disturbance: without 

behavioral disturbance   Qualified Code(s): F03.90 - Unspecified dementia 

without behavioral disturbance   


Assessment/Plan: 


Unchanged








(2) NPH (normal pressure hydrocephalus)


Assessment/Plan: 


Unchanged








(3) Problem situation relating to social and personal history


Assessment/Plan: 


Unchanged, see yesterday's note re: placement.








- Current Meds


Current Meds: 





                               Current Medications











Generic Name Dose Route Start Last Admin





  Trade Name Freq  PRN Reason Stop Dose Admin


 


Acetaminophen  650 mg  03/10/19 11:56  03/31/19 18:55





  Tylenol  PO   650 mg





  Q4HR PRN   Administration





  Pain 1 to 4   





     





     





     


 


Famotidine  20 mg  03/10/19 21:00  04/12/19 10:21





  Pepcid  PO   20 mg





  BID JAISON   Administration





     





     





     





     


 


Memantine  5 mg  03/10/19 21:00  04/12/19 10:22





  Namenda  PO   5 mg





  BID JAISON   Administration





     





     





     





     


 


Multivitamins/Minerals  1 tab  03/11/19 16:00  04/12/19 10:22





  Theragran  M  PO   1 tab





  DAILYWM JAISON   Administration





     





     





     





     


 


Nystatin  1 applic  04/07/19 21:00  04/12/19 10:22





  Nystop  TOP   1 applic





  BID JAISON   Administration





     





     





     





     


 


Polyethylene Glycol  17 gm  03/11/19 09:00  04/12/19 10:21





  Miralax  PO   17 gm





  DAILY JAISON   Administration





     





     





     





     


 


Trazodone HCl  50 mg  03/11/19 21:44  04/08/19 20:14





  Desyrel  PO   50 mg





  QPM PRN   Administration





  Insomnia   





     





     





     














- Lab Result


Fish Bone Diagrams: 


                                 04/05/19 11:45





                                 03/15/19 05:31





Subjective





- Subjective


Nursing Reports: No Complaints





Objective


Vital Signs: 





                                     Oxygen











O2 Source                      Room air














I&O (Last 24 Hrs): 





                          Intake and Output Totals x24h











 04/10/19 04/11/19 04/12/19





 23:59 23:59 23:59


 


Intake Total 1830 1760 240


 


Output Total 200  


 


Balance 1630 1760 240











General: Alert


Respiratory: No respiratory distress





- Results


Results: 





                               Laboratory Results











WBC  5.5 x10^3/uL (4.8-10.8)   04/05/19  11:45    


 


RBC  3.55 10^6/uL (4.70-6.10)  L  04/05/19  11:45    


 


Hgb  12.1 g/dL (14.0-18.0)  L  04/05/19  11:45    


 


Hct  36.6 % (42.0-52.0)  L  04/05/19  11:45    


 


MCV  103.0 fL (80.0-94.0)  H  04/05/19  11:45    


 


MCH  34.2 pg (27.0-31.0)  H  04/05/19  11:45    


 


MCHC  33.2 g/dL (32.0-36.0)   04/05/19  11:45    


 


RDW  14.9 % (12.0-15.0)   04/05/19  11:45    


 


Plt Count  216 10^3/uL (130-450)   04/05/19  11:45    


 


MPV  7.9 fL (7.4-11.4)   04/05/19  11:45    


 


Neut # (Auto)  3.4 10^3/uL (1.5-6.6)   04/05/19  11:45    


 


Lymph # (Auto)  1.0 10^3/uL (1.5-3.5)  L  04/05/19  11:45    


 


Mono # (Auto)  0.6 10^3/uL (0.0-1.0)   04/05/19  11:45    


 


Eos # (Auto)  0.5 10^3/uL (0.0-0.7)   04/05/19  11:45    


 


Baso # (Auto)  0.1 10^3/uL (0.0-0.1)   04/05/19  11:45    


 


Absolute Nucleated RBC  0.01 x10^3/uL  04/05/19  11:45    


 


Total Counted  100   03/13/19  05:05    


 


Band Neuts % (Manual)  0 % (0-10)  03/13/19  05:05    


 


Reactive Lymphs % (Man)  6 %  03/06/19  08:37    


 


Abnorm Lymph % (Manual)  0 %  03/13/19  05:05    


 


Nucleated RBC %  0.1 /100WBC  04/05/19  11:45    


 


Neutrophils # (Manual)  1.5 10^3/uL (1.5-6.6)   03/13/19  05:05    


 


Lymphocytes # (Manual)  0.6 10^3/uL (1.5-3.5)  L  03/13/19  05:05    


 


Monocytes # (Manual)  0.3 10^3/uL (0.0-1.0)   03/13/19  05:05    


 


Eosinophils # (Manual)  0.0 10^3/uL (0-0.7)   03/13/19  05:05    


 


Basophils # (Manual)  0.0 10^3/uL (0-0.1)   03/13/19  05:05    


 


Differential Comment  MANUAL DIFFERENTIAL   03/06/19  08:37    


 


Manual Slide Review  Indicated   03/14/19  04:20    


 


Platelet Estimate  DECREASED (<130,000)  (NORMAL)   03/14/19  04:20    


 


Platelet Morphology  RARE GIANT PLATELETS  (NORMAL)   03/06/19  08:37    


 


RBC Morph Micro Appear  1+ ANISOCYTOSIS  (NORMAL)   03/14/19  04:20    


 


PT  12.7 secs (9.9-12.6)  H  04/05/19  11:45    


 


INR  1.1  (0.8-1.2)   04/05/19  11:45    


 


Sodium  139 mmol/L (135-145)   03/15/19  05:31    


 


Potassium  3.6 mmol/L (3.5-5.0)   03/15/19  05:31    


 


Chloride  105 mmol/L (101-111)   03/15/19  05:31    


 


Carbon Dioxide  26 mmol/L (21-32)   03/15/19  05:31    


 


Anion Gap  8.0  (6-13)   03/15/19  05:31    


 


BUN  15 mg/dL (6-20)   03/15/19  05:31    


 


Creatinine  0.9 mg/dL (0.6-1.2)   03/15/19  05:31    


 


Estimated GFR (MDRD)  82  (>89)  L  03/15/19  05:31    


 


Glucose  93 mg/dL ()   03/15/19  05:31    


 


POC Whole Bld Glucose  70 mg/dL (70 - 100)   03/09/19  11:34    


 


Lactic Acid  1.0 mmol/L (0.5-2.2)   03/10/19  09:15    


 


Calcium  7.9 mg/dL (8.5-10.3)  L  03/15/19  05:31    


 


Phosphorus  1.7 mg/dL (2.5-4.6)  L  03/12/19  06:07    


 


Total Bilirubin  0.6 mg/dL (0.2-1.0)   03/09/19  13:37    


 


AST  30 IU/L (10-42)   03/09/19  13:37    


 


ALT  14 IU/L (10-60)   03/09/19  13:37    


 


Alkaline Phosphatase  98 IU/L ()   03/09/19  13:37    


 


Total Creatine Kinase  122 IU/L ()   03/06/19  08:37    


 


Troponin I  < 0.04 ng/mL (<0.49)   03/09/19  13:37    


 


Total Protein  6.6 g/dL (6.7-8.2)  L  03/09/19  13:37    


 


Albumin  2.7 g/dL (3.2-5.5)  L  03/12/19  06:07    


 


Globulin  3.2 g/dL (2.1-4.2)   03/09/19  13:37    


 


Albumin/Globulin Ratio  1.1  (1.0-2.2)   03/09/19  13:37    


 


Lipase  30 U/L (22-51)   03/09/19  13:37    


 


Urine Color  YELLOW   03/08/19  18:29    


 


Urine Clarity  CLEAR  (CLEAR)   03/08/19  18:29    


 


Urine pH  6.0 PH (5.0-7.5)   03/08/19  18:29    


 


Ur Specific Gravity  1.025  (1.002-1.030)   03/08/19  18:29    


 


Urine Protein  NEGATIVE mg/dL (NEGATIVE)   03/08/19  18:29    


 


Urine Glucose (UA)  NEGATIVE mg/dL (NEGATIVE)   03/08/19  18:29    


 


Urine Ketones  15 mg/dL (NEGATIVE)  H  03/08/19  18:29    


 


Urine Occult Blood  SMALL  (NEGATIVE)  H  03/08/19  18:29    


 


Urine Nitrite  NEGATIVE  (NEGATIVE)   03/08/19  18:29    


 


Urine Bilirubin  NEGATIVE  (NEGATIVE)   03/08/19  18:29    


 


Urine Urobilinogen  0.2 (NORMAL) E.U./dL (NORMAL)   03/08/19  18:29    


 


Ur Leukocyte Esterase  NEGATIVE  (NEGATIVE)   03/08/19  18:29    


 


Urine RBC  6-10 /HPF (0-5)  H  03/08/19  18:29    


 


Urine WBC  0-3 /HPF (0-3)   03/08/19  18:29    


 


Ur Squamous Epith Cells  RARE Squamous  (<= Few)   03/08/19  18:29    


 


Urine Bacteria  Rare /HPF (None Seen)   03/08/19  18:29    


 


Urine Mucus  Few Strands   03/08/19  18:29    


 


Ur Microscopic Review  INDICATED   03/08/19  18:29    


 


Urine Culture Comments  NOT INDICATED   03/08/19  18:29    


 


Last Dose Date  UNK   03/12/19  09:30    


 


Last Dose Time  UNK   03/12/19  09:30    


 


Vancomycin Trough  17.4 ug/mL (10.0-20.0)   03/12/19  09:30    


 


Urine Opiates Screen  NEGATIVE  (NEGATIVE)   03/04/19  00:00    


 


Ur Oxycodone Screen  NEGATIVE  (NEGATIVE)   03/04/19  00:00    


 


Urine Methadone Screen  NEGATIVE  (NEGATIVE)   03/04/19  00:00    


 


Ur Propoxyphene Screen  NEGATIVE  (NEGATIVE)   03/04/19  00:00    


 


Ur Barbiturates Screen  NEGATIVE  (NEGATIVE)   03/04/19  00:00    


 


Ur Tricyclics Screen  NEGATIVE  (NEGATIVE)   03/04/19  00:00    


 


Ur Phencyclidine Scrn  NEGATIVE  (NEGATIVE)   03/04/19  00:00    


 


Ur Amphetamine Screen  NEGATIVE  (NEGATIVE)   03/04/19  00:00    


 


U Methamphetamines Scrn  NEGATIVE  (NEGATIVE)   03/04/19  00:00    


 


U Benzodiazepines Scrn  NEGATIVE  (NEGATIVE)   03/04/19  00:00    


 


Urine Cocaine Screen  NEGATIVE  (NEGATIVE)   03/04/19  00:00    


 


U Cannabinoids Screen  NEGATIVE  (NEGATIVE)   03/04/19  00:00    


 


C. difficile Tox B Gene  NEGATIVE  (NEGATIVE)   03/10/19  07:43    














Sepsis Event Note (H)





- Evaluation


Current Stage of Sepsis: Ruled out

## 2019-04-13 RX ADMIN — POLYETHYLENE GLYCOL 3350 SCH GM: 17 POWDER, FOR SOLUTION ORAL at 09:32

## 2019-04-13 RX ADMIN — MEMANTINE HYDROCHLORIDE SCH MG: 5 TABLET ORAL at 21:18

## 2019-04-13 RX ADMIN — Medication SCH TAB: at 09:32

## 2019-04-13 RX ADMIN — FAMOTIDINE SCH MG: 20 TABLET, FILM COATED ORAL at 09:32

## 2019-04-13 RX ADMIN — FAMOTIDINE SCH MG: 20 TABLET, FILM COATED ORAL at 21:19

## 2019-04-13 RX ADMIN — MEMANTINE HYDROCHLORIDE SCH MG: 5 TABLET ORAL at 09:32

## 2019-04-13 RX ADMIN — NYSTATIN SCH APPLIC: 100000 POWDER TOPICAL at 09:32

## 2019-04-13 NOTE — PROVIDER PROGRESS NOTE
Assessment/Plan





- Problem List


(1) Dementia


Qualifiers: 


   Dementia type: unspecified type   Dementia behavioral disturbance: without 

behavioral disturbance   Qualified Code(s): F03.90 - Unspecified dementia 

without behavioral disturbance   


Assessment/Plan: 


Unchanged








(2) NPH (normal pressure hydrocephalus)


Assessment/Plan: 


Unchanged








(3) Problem situation relating to social and personal history


Assessment/Plan: 


Unchanged








- Current Meds


Current Meds: 





                               Current Medications











Generic Name Dose Route Start Last Admin





  Trade Name Freq  PRN Reason Stop Dose Admin


 


Acetaminophen  650 mg  03/10/19 11:56  03/31/19 18:55





  Tylenol  PO   650 mg





  Q4HR PRN   Administration





  Pain 1 to 4   





     





     





     


 


Famotidine  20 mg  03/10/19 21:00  04/13/19 09:32





  Pepcid  PO   20 mg





  BID JAISON   Administration





     





     





     





     


 


Memantine  5 mg  03/10/19 21:00  04/13/19 09:32





  Namenda  PO   5 mg





  BID JAISON   Administration





     





     





     





     


 


Multivitamins/Minerals  1 tab  03/11/19 16:00  04/13/19 09:32





  Theragran  M  PO   1 tab





  DAILYWM JIASON   Administration





     





     





     





     


 


Nystatin  1 applic  04/07/19 21:00  04/13/19 09:32





  Nystop  TOP   1 applic





  BID JAISON   Administration





     





     





     





     


 


Polyethylene Glycol  17 gm  03/11/19 09:00  04/13/19 09:32





  Miralax  PO   17 gm





  DAILY JAISON   Administration





     





     





     





     


 


Trazodone HCl  50 mg  03/11/19 21:44  04/12/19 21:28





  Desyrel  PO   50 mg





  QPM PRN   Administration





  Insomnia   





     





     





     














- Lab Result


Fish Bone Diagrams: 


                                 04/05/19 11:45





                                 03/15/19 05:31





Subjective





- Subjective


Nursing Reports: No Complaints





Objective


Vital Signs: 





                                     Oxygen











O2 Source                      Room air














I&O (Last 24 Hrs): 





                          Intake and Output Totals x24h











 04/11/19 04/12/19 04/13/19





 23:59 23:59 23:59


 


Intake Total 1760 810 600


 


Balance 1760 810 600











General: Alert


Respiratory: No respiratory distress





- Results


Results: 





                               Laboratory Results











WBC  5.5 x10^3/uL (4.8-10.8)   04/05/19  11:45    


 


RBC  3.55 10^6/uL (4.70-6.10)  L  04/05/19  11:45    


 


Hgb  12.1 g/dL (14.0-18.0)  L  04/05/19  11:45    


 


Hct  36.6 % (42.0-52.0)  L  04/05/19  11:45    


 


MCV  103.0 fL (80.0-94.0)  H  04/05/19  11:45    


 


MCH  34.2 pg (27.0-31.0)  H  04/05/19  11:45    


 


MCHC  33.2 g/dL (32.0-36.0)   04/05/19  11:45    


 


RDW  14.9 % (12.0-15.0)   04/05/19  11:45    


 


Plt Count  216 10^3/uL (130-450)   04/05/19  11:45    


 


MPV  7.9 fL (7.4-11.4)   04/05/19  11:45    


 


Neut # (Auto)  3.4 10^3/uL (1.5-6.6)   04/05/19  11:45    


 


Lymph # (Auto)  1.0 10^3/uL (1.5-3.5)  L  04/05/19  11:45    


 


Mono # (Auto)  0.6 10^3/uL (0.0-1.0)   04/05/19  11:45    


 


Eos # (Auto)  0.5 10^3/uL (0.0-0.7)   04/05/19  11:45    


 


Baso # (Auto)  0.1 10^3/uL (0.0-0.1)   04/05/19  11:45    


 


Absolute Nucleated RBC  0.01 x10^3/uL  04/05/19  11:45    


 


Total Counted  100   03/13/19  05:05    


 


Band Neuts % (Manual)  0 % (0-10)  03/13/19  05:05    


 


Reactive Lymphs % (Man)  6 %  03/06/19  08:37    


 


Abnorm Lymph % (Manual)  0 %  03/13/19  05:05    


 


Nucleated RBC %  0.1 /100WBC  04/05/19  11:45    


 


Neutrophils # (Manual)  1.5 10^3/uL (1.5-6.6)   03/13/19  05:05    


 


Lymphocytes # (Manual)  0.6 10^3/uL (1.5-3.5)  L  03/13/19  05:05    


 


Monocytes # (Manual)  0.3 10^3/uL (0.0-1.0)   03/13/19  05:05    


 


Eosinophils # (Manual)  0.0 10^3/uL (0-0.7)   03/13/19  05:05    


 


Basophils # (Manual)  0.0 10^3/uL (0-0.1)   03/13/19  05:05    


 


Differential Comment  MANUAL DIFFERENTIAL   03/06/19  08:37    


 


Manual Slide Review  Indicated   03/14/19  04:20    


 


Platelet Estimate  DECREASED (<130,000)  (NORMAL)   03/14/19  04:20    


 


Platelet Morphology  RARE GIANT PLATELETS  (NORMAL)   03/06/19  08:37    


 


RBC Morph Micro Appear  1+ ANISOCYTOSIS  (NORMAL)   03/14/19  04:20    


 


PT  12.7 secs (9.9-12.6)  H  04/05/19  11:45    


 


INR  1.1  (0.8-1.2)   04/05/19  11:45    


 


Sodium  139 mmol/L (135-145)   03/15/19  05:31    


 


Potassium  3.6 mmol/L (3.5-5.0)   03/15/19  05:31    


 


Chloride  105 mmol/L (101-111)   03/15/19  05:31    


 


Carbon Dioxide  26 mmol/L (21-32)   03/15/19  05:31    


 


Anion Gap  8.0  (6-13)   03/15/19  05:31    


 


BUN  15 mg/dL (6-20)   03/15/19  05:31    


 


Creatinine  0.9 mg/dL (0.6-1.2)   03/15/19  05:31    


 


Estimated GFR (MDRD)  82  (>89)  L  03/15/19  05:31    


 


Glucose  93 mg/dL ()   03/15/19  05:31    


 


POC Whole Bld Glucose  70 mg/dL (70 - 100)   03/09/19  11:34    


 


Lactic Acid  1.0 mmol/L (0.5-2.2)   03/10/19  09:15    


 


Calcium  7.9 mg/dL (8.5-10.3)  L  03/15/19  05:31    


 


Phosphorus  1.7 mg/dL (2.5-4.6)  L  03/12/19  06:07    


 


Total Bilirubin  0.6 mg/dL (0.2-1.0)   03/09/19  13:37    


 


AST  30 IU/L (10-42)   03/09/19  13:37    


 


ALT  14 IU/L (10-60)   03/09/19  13:37    


 


Alkaline Phosphatase  98 IU/L ()   03/09/19  13:37    


 


Total Creatine Kinase  122 IU/L ()   03/06/19  08:37    


 


Troponin I  < 0.04 ng/mL (<0.49)   03/09/19  13:37    


 


Total Protein  6.6 g/dL (6.7-8.2)  L  03/09/19  13:37    


 


Albumin  2.7 g/dL (3.2-5.5)  L  03/12/19  06:07    


 


Globulin  3.2 g/dL (2.1-4.2)   03/09/19  13:37    


 


Albumin/Globulin Ratio  1.1  (1.0-2.2)   03/09/19  13:37    


 


Lipase  30 U/L (22-51)   03/09/19  13:37    


 


Urine Color  YELLOW   03/08/19  18:29    


 


Urine Clarity  CLEAR  (CLEAR)   03/08/19  18:29    


 


Urine pH  6.0 PH (5.0-7.5)   03/08/19  18:29    


 


Ur Specific Gravity  1.025  (1.002-1.030)   03/08/19  18:29    


 


Urine Protein  NEGATIVE mg/dL (NEGATIVE)   03/08/19  18:29    


 


Urine Glucose (UA)  NEGATIVE mg/dL (NEGATIVE)   03/08/19  18:29    


 


Urine Ketones  15 mg/dL (NEGATIVE)  H  03/08/19  18:29    


 


Urine Occult Blood  SMALL  (NEGATIVE)  H  03/08/19  18:29    


 


Urine Nitrite  NEGATIVE  (NEGATIVE)   03/08/19  18:29    


 


Urine Bilirubin  NEGATIVE  (NEGATIVE)   03/08/19  18:29    


 


Urine Urobilinogen  0.2 (NORMAL) E.U./dL (NORMAL)   03/08/19  18:29    


 


Ur Leukocyte Esterase  NEGATIVE  (NEGATIVE)   03/08/19  18:29    


 


Urine RBC  6-10 /HPF (0-5)  H  03/08/19  18:29    


 


Urine WBC  0-3 /HPF (0-3)   03/08/19  18:29    


 


Ur Squamous Epith Cells  RARE Squamous  (<= Few)   03/08/19  18:29    


 


Urine Bacteria  Rare /HPF (None Seen)   03/08/19  18:29    


 


Urine Mucus  Few Strands   03/08/19  18:29    


 


Ur Microscopic Review  INDICATED   03/08/19  18:29    


 


Urine Culture Comments  NOT INDICATED   03/08/19  18:29    


 


Last Dose Date  UNK   03/12/19  09:30    


 


Last Dose Time  UNK   03/12/19  09:30    


 


Vancomycin Trough  17.4 ug/mL (10.0-20.0)   03/12/19  09:30    


 


Urine Opiates Screen  NEGATIVE  (NEGATIVE)   03/04/19  00:00    


 


Ur Oxycodone Screen  NEGATIVE  (NEGATIVE)   03/04/19  00:00    


 


Urine Methadone Screen  NEGATIVE  (NEGATIVE)   03/04/19  00:00    


 


Ur Propoxyphene Screen  NEGATIVE  (NEGATIVE)   03/04/19  00:00    


 


Ur Barbiturates Screen  NEGATIVE  (NEGATIVE)   03/04/19  00:00    


 


Ur Tricyclics Screen  NEGATIVE  (NEGATIVE)   03/04/19  00:00    


 


Ur Phencyclidine Scrn  NEGATIVE  (NEGATIVE)   03/04/19  00:00    


 


Ur Amphetamine Screen  NEGATIVE  (NEGATIVE)   03/04/19  00:00    


 


U Methamphetamines Scrn  NEGATIVE  (NEGATIVE)   03/04/19  00:00    


 


U Benzodiazepines Scrn  NEGATIVE  (NEGATIVE)   03/04/19  00:00    


 


Urine Cocaine Screen  NEGATIVE  (NEGATIVE)   03/04/19  00:00    


 


U Cannabinoids Screen  NEGATIVE  (NEGATIVE)   03/04/19  00:00    


 


C. difficile Tox B Gene  NEGATIVE  (NEGATIVE)   03/10/19  07:43    














Sepsis Event Note (H)





- Evaluation


Current Stage of Sepsis: Ruled out

## 2019-04-13 NOTE — DISCHARGE SUMMARY
Discharge Summary


Code Status: Do Not Attempt Resuscitation





- ALLERGIES


Allergies/Adverse Reactions: 


                                    Allergies











Allergy/AdvReac Type Severity Reaction Status Date / Time


 


Sulfa (Sulfonamide Allergy  Unknown Verified 06/18/18 16:06





Antibiotics)     


 


Penicillins AdvReac  Unknown Verified 06/18/18 16:06














- MEDICATIONS


Home Medications: 


                                Ambulatory Orders











 Medication  Instructions  Recorded  Confirmed


 


No Known Home Medications  06/18/18 03/08/19














- LABS


Result Diagrams: 


                                 04/05/19 11:45





                                 03/15/19 05:31





- SEPSIS


Current Stage of Sepsis: Ruled out

## 2019-04-14 RX ADMIN — NYSTATIN SCH APPLIC: 100000 POWDER TOPICAL at 09:50

## 2019-04-14 RX ADMIN — Medication SCH TAB: at 09:48

## 2019-04-14 RX ADMIN — NYSTATIN SCH APPLIC: 100000 POWDER TOPICAL at 21:14

## 2019-04-14 RX ADMIN — MEMANTINE HYDROCHLORIDE SCH MG: 5 TABLET ORAL at 21:14

## 2019-04-14 RX ADMIN — FAMOTIDINE SCH MG: 20 TABLET, FILM COATED ORAL at 09:48

## 2019-04-14 RX ADMIN — NYSTATIN SCH: 100000 POWDER TOPICAL at 02:25

## 2019-04-14 RX ADMIN — MEMANTINE HYDROCHLORIDE SCH MG: 5 TABLET ORAL at 09:48

## 2019-04-14 RX ADMIN — FAMOTIDINE SCH MG: 20 TABLET, FILM COATED ORAL at 21:14

## 2019-04-14 RX ADMIN — POLYETHYLENE GLYCOL 3350 SCH: 17 POWDER, FOR SOLUTION ORAL at 02:34

## 2019-04-14 NOTE — PROVIDER PROGRESS NOTE
Assessment/Plan





- Problem List


(1) Dementia


Qualifiers: 


   Dementia type: unspecified type   Dementia behavioral disturbance: without 

behavioral disturbance   Qualified Code(s): F03.90 - Unspecified dementia 

without behavioral disturbance   


Assessment/Plan: 


Unchanged








(2) NPH (normal pressure hydrocephalus)


Assessment/Plan: 


Stable








(3) Problem situation relating to social and personal history


Assessment/Plan: 


SW is working on placement.








- Current Meds


Current Meds: 





                               Current Medications











Generic Name Dose Route Start Last Admin





  Trade Name Freq  PRN Reason Stop Dose Admin


 


Acetaminophen  650 mg  03/10/19 11:56  03/31/19 18:55





  Tylenol  PO   650 mg





  Q4HR PRN   Administration





  Pain 1 to 4   





     





     





     


 


Famotidine  20 mg  03/10/19 21:00  04/14/19 09:48





  Pepcid  PO   20 mg





  BID JAISON   Administration





     





     





     





     


 


Memantine  5 mg  03/10/19 21:00  04/14/19 09:48





  Namenda  PO   5 mg





  BID JAISON   Administration





     





     





     





     


 


Multivitamins/Minerals  1 tab  03/11/19 16:00  04/14/19 09:48





  Theragran  M  PO   1 tab





  DAILYWM JAISON   Administration





     





     





     





     


 


Nystatin  1 applic  04/07/19 21:00  04/14/19 09:50





  Nystop  TOP   1 applic





  BID JAISON   Administration





     





     





     





     


 


Polyethylene Glycol  17 gm  03/11/19 09:00  04/14/19 02:34





  Miralax  PO   Not Given





  DAILY JAISON   





     





     





     





     


 


Trazodone HCl  50 mg  03/11/19 21:44  04/13/19 21:19





  Desyrel  PO   50 mg





  QPM PRN   Administration





  Insomnia   





     





     





     














- Lab Result


Fish Bone Diagrams: 


                                 04/05/19 11:45





                                 03/15/19 05:31





Subjective





- Subjective


Patient Reports: No Complaints


Nursing Reports: No Complaints





Objective


Vital Signs: 





                                     Oxygen











O2 Source                      Room air














I&O (Last 24 Hrs): 





                          Intake and Output Totals x24h











 04/12/19 04/13/19 04/14/19





 23:59 23:59 23:59


 


Intake Total 810 960 900


 


Balance 810 960 900











General: Alert


Respiratory: No respiratory distress





- Results


Results: 





                               Laboratory Results











WBC  5.5 x10^3/uL (4.8-10.8)   04/05/19  11:45    


 


RBC  3.55 10^6/uL (4.70-6.10)  L  04/05/19  11:45    


 


Hgb  12.1 g/dL (14.0-18.0)  L  04/05/19  11:45    


 


Hct  36.6 % (42.0-52.0)  L  04/05/19  11:45    


 


MCV  103.0 fL (80.0-94.0)  H  04/05/19  11:45    


 


MCH  34.2 pg (27.0-31.0)  H  04/05/19  11:45    


 


MCHC  33.2 g/dL (32.0-36.0)   04/05/19  11:45    


 


RDW  14.9 % (12.0-15.0)   04/05/19  11:45    


 


Plt Count  216 10^3/uL (130-450)   04/05/19  11:45    


 


MPV  7.9 fL (7.4-11.4)   04/05/19  11:45    


 


Neut # (Auto)  3.4 10^3/uL (1.5-6.6)   04/05/19  11:45    


 


Lymph # (Auto)  1.0 10^3/uL (1.5-3.5)  L  04/05/19  11:45    


 


Mono # (Auto)  0.6 10^3/uL (0.0-1.0)   04/05/19  11:45    


 


Eos # (Auto)  0.5 10^3/uL (0.0-0.7)   04/05/19  11:45    


 


Baso # (Auto)  0.1 10^3/uL (0.0-0.1)   04/05/19  11:45    


 


Absolute Nucleated RBC  0.01 x10^3/uL  04/05/19  11:45    


 


Total Counted  100   03/13/19  05:05    


 


Band Neuts % (Manual)  0 % (0-10)  03/13/19  05:05    


 


Reactive Lymphs % (Man)  6 %  03/06/19  08:37    


 


Abnorm Lymph % (Manual)  0 %  03/13/19  05:05    


 


Nucleated RBC %  0.1 /100WBC  04/05/19  11:45    


 


Neutrophils # (Manual)  1.5 10^3/uL (1.5-6.6)   03/13/19  05:05    


 


Lymphocytes # (Manual)  0.6 10^3/uL (1.5-3.5)  L  03/13/19  05:05    


 


Monocytes # (Manual)  0.3 10^3/uL (0.0-1.0)   03/13/19  05:05    


 


Eosinophils # (Manual)  0.0 10^3/uL (0-0.7)   03/13/19  05:05    


 


Basophils # (Manual)  0.0 10^3/uL (0-0.1)   03/13/19  05:05    


 


Differential Comment  MANUAL DIFFERENTIAL   03/06/19  08:37    


 


Manual Slide Review  Indicated   03/14/19  04:20    


 


Platelet Estimate  DECREASED (<130,000)  (NORMAL)   03/14/19  04:20    


 


Platelet Morphology  RARE GIANT PLATELETS  (NORMAL)   03/06/19  08:37    


 


RBC Morph Micro Appear  1+ ANISOCYTOSIS  (NORMAL)   03/14/19  04:20    


 


PT  12.7 secs (9.9-12.6)  H  04/05/19  11:45    


 


INR  1.1  (0.8-1.2)   04/05/19  11:45    


 


Sodium  139 mmol/L (135-145)   03/15/19  05:31    


 


Potassium  3.6 mmol/L (3.5-5.0)   03/15/19  05:31    


 


Chloride  105 mmol/L (101-111)   03/15/19  05:31    


 


Carbon Dioxide  26 mmol/L (21-32)   03/15/19  05:31    


 


Anion Gap  8.0  (6-13)   03/15/19  05:31    


 


BUN  15 mg/dL (6-20)   03/15/19  05:31    


 


Creatinine  0.9 mg/dL (0.6-1.2)   03/15/19  05:31    


 


Estimated GFR (MDRD)  82  (>89)  L  03/15/19  05:31    


 


Glucose  93 mg/dL ()   03/15/19  05:31    


 


POC Whole Bld Glucose  70 mg/dL (70 - 100)   03/09/19  11:34    


 


Lactic Acid  1.0 mmol/L (0.5-2.2)   03/10/19  09:15    


 


Calcium  7.9 mg/dL (8.5-10.3)  L  03/15/19  05:31    


 


Phosphorus  1.7 mg/dL (2.5-4.6)  L  03/12/19  06:07    


 


Total Bilirubin  0.6 mg/dL (0.2-1.0)   03/09/19  13:37    


 


AST  30 IU/L (10-42)   03/09/19  13:37    


 


ALT  14 IU/L (10-60)   03/09/19  13:37    


 


Alkaline Phosphatase  98 IU/L ()   03/09/19  13:37    


 


Total Creatine Kinase  122 IU/L ()   03/06/19  08:37    


 


Troponin I  < 0.04 ng/mL (<0.49)   03/09/19  13:37    


 


Total Protein  6.6 g/dL (6.7-8.2)  L  03/09/19  13:37    


 


Albumin  2.7 g/dL (3.2-5.5)  L  03/12/19  06:07    


 


Globulin  3.2 g/dL (2.1-4.2)   03/09/19  13:37    


 


Albumin/Globulin Ratio  1.1  (1.0-2.2)   03/09/19  13:37    


 


Lipase  30 U/L (22-51)   03/09/19  13:37    


 


Urine Color  YELLOW   03/08/19  18:29    


 


Urine Clarity  CLEAR  (CLEAR)   03/08/19  18:29    


 


Urine pH  6.0 PH (5.0-7.5)   03/08/19  18:29    


 


Ur Specific Gravity  1.025  (1.002-1.030)   03/08/19  18:29    


 


Urine Protein  NEGATIVE mg/dL (NEGATIVE)   03/08/19  18:29    


 


Urine Glucose (UA)  NEGATIVE mg/dL (NEGATIVE)   03/08/19  18:29    


 


Urine Ketones  15 mg/dL (NEGATIVE)  H  03/08/19  18:29    


 


Urine Occult Blood  SMALL  (NEGATIVE)  H  03/08/19  18:29    


 


Urine Nitrite  NEGATIVE  (NEGATIVE)   03/08/19  18:29    


 


Urine Bilirubin  NEGATIVE  (NEGATIVE)   03/08/19  18:29    


 


Urine Urobilinogen  0.2 (NORMAL) E.U./dL (NORMAL)   03/08/19  18:29    


 


Ur Leukocyte Esterase  NEGATIVE  (NEGATIVE)   03/08/19  18:29    


 


Urine RBC  6-10 /HPF (0-5)  H  03/08/19  18:29    


 


Urine WBC  0-3 /HPF (0-3)   03/08/19  18:29    


 


Ur Squamous Epith Cells  RARE Squamous  (<= Few)   03/08/19  18:29    


 


Urine Bacteria  Rare /HPF (None Seen)   03/08/19  18:29    


 


Urine Mucus  Few Strands   03/08/19  18:29    


 


Ur Microscopic Review  INDICATED   03/08/19  18:29    


 


Urine Culture Comments  NOT INDICATED   03/08/19  18:29    


 


Last Dose Date  UNK   03/12/19  09:30    


 


Last Dose Time  UNK   03/12/19  09:30    


 


Vancomycin Trough  17.4 ug/mL (10.0-20.0)   03/12/19  09:30    


 


Urine Opiates Screen  NEGATIVE  (NEGATIVE)   03/04/19  00:00    


 


Ur Oxycodone Screen  NEGATIVE  (NEGATIVE)   03/04/19  00:00    


 


Urine Methadone Screen  NEGATIVE  (NEGATIVE)   03/04/19  00:00    


 


Ur Propoxyphene Screen  NEGATIVE  (NEGATIVE)   03/04/19  00:00    


 


Ur Barbiturates Screen  NEGATIVE  (NEGATIVE)   03/04/19  00:00    


 


Ur Tricyclics Screen  NEGATIVE  (NEGATIVE)   03/04/19  00:00    


 


Ur Phencyclidine Scrn  NEGATIVE  (NEGATIVE)   03/04/19  00:00    


 


Ur Amphetamine Screen  NEGATIVE  (NEGATIVE)   03/04/19  00:00    


 


U Methamphetamines Scrn  NEGATIVE  (NEGATIVE)   03/04/19  00:00    


 


U Benzodiazepines Scrn  NEGATIVE  (NEGATIVE)   03/04/19  00:00    


 


Urine Cocaine Screen  NEGATIVE  (NEGATIVE)   03/04/19  00:00    


 


U Cannabinoids Screen  NEGATIVE  (NEGATIVE)   03/04/19  00:00    


 


C. difficile Tox B Gene  NEGATIVE  (NEGATIVE)   03/10/19  07:43    














Sepsis Event Note (H)





- Evaluation


Current Stage of Sepsis: Ruled out

## 2019-04-15 RX ADMIN — Medication SCH TAB: at 10:15

## 2019-04-15 RX ADMIN — NYSTATIN SCH APPLIC: 100000 POWDER TOPICAL at 10:16

## 2019-04-15 RX ADMIN — FAMOTIDINE SCH MG: 20 TABLET, FILM COATED ORAL at 10:16

## 2019-04-15 RX ADMIN — FAMOTIDINE SCH MG: 20 TABLET, FILM COATED ORAL at 20:09

## 2019-04-15 RX ADMIN — NYSTATIN SCH APPLIC: 100000 POWDER TOPICAL at 20:09

## 2019-04-15 RX ADMIN — MEMANTINE HYDROCHLORIDE SCH MG: 5 TABLET ORAL at 20:09

## 2019-04-15 RX ADMIN — POLYETHYLENE GLYCOL 3350 SCH GM: 17 POWDER, FOR SOLUTION ORAL at 10:16

## 2019-04-15 RX ADMIN — MEMANTINE HYDROCHLORIDE SCH MG: 5 TABLET ORAL at 10:16

## 2019-04-16 RX ADMIN — MEMANTINE HYDROCHLORIDE SCH MG: 5 TABLET ORAL at 22:30

## 2019-04-16 RX ADMIN — FAMOTIDINE SCH MG: 20 TABLET, FILM COATED ORAL at 08:51

## 2019-04-16 RX ADMIN — MEMANTINE HYDROCHLORIDE SCH MG: 5 TABLET ORAL at 08:50

## 2019-04-16 RX ADMIN — FAMOTIDINE SCH MG: 20 TABLET, FILM COATED ORAL at 22:31

## 2019-04-16 RX ADMIN — Medication SCH TAB: at 08:50

## 2019-04-16 RX ADMIN — NYSTATIN SCH APPLIC: 100000 POWDER TOPICAL at 22:31

## 2019-04-16 RX ADMIN — POLYETHYLENE GLYCOL 3350 SCH GM: 17 POWDER, FOR SOLUTION ORAL at 08:50

## 2019-04-16 RX ADMIN — NYSTATIN SCH APPLIC: 100000 POWDER TOPICAL at 08:51

## 2019-04-16 NOTE — PROVIDER PROGRESS NOTE
Subjective





- Subjective


Pt reports feeling: No change


Subjective: 


pt is stable, and replacement is pending








Current Medications





- Current Medications


Current Medications: 





Active Medications





Acetaminophen (Tylenol)  650 mg PO Q4HR PRN


   PRN Reason: Pain 1 to 4


   Last Admin: 03/31/19 18:55 Dose:  650 mg


Famotidine (Pepcid)  20 mg PO BID [facility]


   Last Admin: 04/16/19 08:51 Dose:  20 mg


Memantine (Namenda)  5 mg PO BID [facility]


   Last Admin: 04/16/19 08:50 Dose:  5 mg


Multi-Ingredient Ointment (Zinc Oxide)  1 applic TOP PRN PRN


   PRN Reason: Skin Care


Multivitamins/Minerals (Theragran  M)  1 tab PO DAILYWM [facility]


   Last Admin: 04/16/19 08:50 Dose:  1 tab


Nystatin (Nystop)  1 applic TOP BID [facility]


   Last Admin: 04/16/19 08:51 Dose:  1 applic


Polyethylene Glycol (Miralax)  17 gm PO DAILY [facility]


   Last Admin: 04/16/19 08:50 Dose:  17 gm


Trazodone HCl (Desyrel)  50 mg PO QPM PRN


   PRN Reason: Insomnia


   Last Admin: 04/15/19 20:09 Dose:  50 mg





                                        





No Known Home Medications  06/18/18 











Objective





- Vital Signs/Intake & Output


Reviewed Vital Signs: Yes


Vital Signs: 


                                Vital Signs x48h











  Temp Pulse Resp BP Pulse Ox


 


 04/16/19 09:01  36.5 C  75  18  121/56 L  94











Intake & Output: 


                                 Intake & Output











 04/13/19 04/14/19 04/15/19 04/16/19





 23:59 23:59 23:59 23:59


 


Intake Total 960 1140 1440 1130


 


Output Total    1


 


Balance 960 1140 1440 1129














- Objective


General Appearance: positive: No acute distress, Alert.  negative: Lethargic


Eyes Bilateral: positive: Normal inspection, PERRL, No lid inflammation, 

Conjunctivae nml


ENT: positive: ENT inspection nml, Pharynx nml, No signs of dehydration.  

negative: Purulent nasal drainage, Pharyngeal erythema, Oral lesions


Neck: positive: Nml inspection, Thyroid nml, No JVD.  negative: Trachea midline,

Thyromegaly, Lymphadenopathy (R), Lymphadenopathy (L), Stiff neck, 

Swelling/bruising, Tracheal deviation


Respiratory: positive: Chest non-tender, No respiratory distress, Breath sounds 

nml.  negative: Wheezes, Rales, Rhonchi


Cardiovascular: positive: Regular rate & rhythm, No murmur, No gallop.  

negative: Irregularly irregular, Extrasystoles, Tachycardia, Bradycardia, JVD 

present, Systolic murmur, Diastolic murmur


Peripheral Pulses: 2+ Radial (R), 2+ Radial (L), 2+ Dorsalis pedis (R), 2+ 

Dorsalis pedis (L)


Abdomen: positive: Non-tender, No organomegaly, Nml bowel sounds, No distention.

 negative: Tenderness, Guarding, Rebound


Back: positive: Nml inspection.  negative: CVA tenderness (R), CVA tenderness 

(L)


Skin: positive: Color nml, No rash, Warm, Dry.  negative: Cyanosis, Diaphoresis,

Pallor


Extremities: positive: Non-tender, Full ROM, Nml appearance.  negative: Calf 

tenderness, Rachael's sign/cords


Neurologic/Psychiatric: positive: Mood/affect nml.  negative: Weakness, Sensory 

loss, Facial droop, Slurred/abnml speech, Depressed mood/affect





- Lab Results


Fish Bones: 


                                 04/05/19 11:45





                                 03/15/19 05:31





ABX Reporting


Has patient been on IV antibiotics over the past 48 hours?: No





Sepsis Event Note (H)





- Evaluation


Current Stage of Sepsis: Ruled out





Assessment/Plan





- Problem List


(1) Dementia


Impression: 


(1) Dementia


 Assessment/Plan: 


stable, Continue Namenda.








(2) NPH (normal pressure hydrocephalus)


Assessment/Plan: 


stable symptoms, continue vital and nurse support








(3) Problem situation relating to social and personal history


Assessment/Plan: 


Now pt is Still awaiting guardianship to become official, POLST to be finalized 

then, and a facility to then accept him for permanent residence.


Qualifiers: 


   Dementia type: unspecified type   Dementia behavioral disturbance: without 

behavioral disturbance   Qualified Code(s): F03.90 - Unspecified dementia 

without behavioral disturbance

## 2019-04-17 RX ADMIN — POLYETHYLENE GLYCOL 3350 SCH GM: 17 POWDER, FOR SOLUTION ORAL at 09:38

## 2019-04-17 RX ADMIN — FAMOTIDINE SCH MG: 20 TABLET, FILM COATED ORAL at 09:20

## 2019-04-17 RX ADMIN — Medication SCH TAB: at 09:20

## 2019-04-17 RX ADMIN — MEMANTINE HYDROCHLORIDE SCH MG: 5 TABLET ORAL at 22:18

## 2019-04-17 RX ADMIN — MEMANTINE HYDROCHLORIDE SCH MG: 5 TABLET ORAL at 09:21

## 2019-04-17 RX ADMIN — FAMOTIDINE SCH MG: 20 TABLET, FILM COATED ORAL at 22:18

## 2019-04-17 RX ADMIN — NYSTATIN SCH APPLIC: 100000 POWDER TOPICAL at 22:18

## 2019-04-17 RX ADMIN — NYSTATIN SCH APPLIC: 100000 POWDER TOPICAL at 09:39

## 2019-04-17 NOTE — PROVIDER PROGRESS NOTE
Subjective





- Prog Note Date


Prog Note Date: 04/17/19





- Subjective


Pt reports feeling: No change


Subjective: 


stable, pt has no complaint








Current Medications





- Current Medications


Current Medications: 





Active Medications





Acetaminophen (Tylenol)  650 mg PO Q4HR PRN


   PRN Reason: Pain 1 to 4


   Last Admin: 03/31/19 18:55 Dose:  650 mg


Famotidine (Pepcid)  20 mg PO BID [facility]


   Last Admin: 04/17/19 09:20 Dose:  20 mg


Memantine (Namenda)  5 mg PO BID [facility]


   Last Admin: 04/17/19 09:21 Dose:  5 mg


Multi-Ingredient Ointment (Zinc Oxide)  1 applic TOP PRN PRN


   PRN Reason: Skin Care


Multivitamins/Minerals (Theragran  M)  1 tab PO DAILYWM [facility]


   Last Admin: 04/17/19 09:20 Dose:  1 tab


Nystatin (Nystop)  1 applic TOP BID [facility]


   Last Admin: 04/17/19 09:39 Dose:  1 applic


Polyethylene Glycol (Miralax)  17 gm PO DAILY [facility]


   Last Admin: 04/17/19 09:38 Dose:  17 gm


Trazodone HCl (Desyrel)  50 mg PO QPM PRN


   PRN Reason: Insomnia


   Last Admin: 04/15/19 20:09 Dose:  50 mg





                                        





No Known Home Medications  06/18/18 











Objective





- Vital Signs/Intake & Output


Reviewed Vital Signs: Yes


Intake & Output: 


                                 Intake & Output











 04/14/19 04/15/19 04/16/19 04/17/19





 23:59 23:59 23:59 23:59


 


Intake Total 1140 1440 1310 560


 


Output Total   1 


 


Balance 1140 1440 1309 560














- Objective


General Appearance: positive: No acute distress, Alert.  negative: Lethargic


Eyes Bilateral: positive: Normal inspection, PERRL, No lid inflammation, 

Conjunctivae nml


ENT: positive: ENT inspection nml, Pharynx nml, No signs of dehydration.  

negative: Purulent nasal drainage, Pharyngeal erythema, Oral lesions


Neck: positive: Nml inspection, Thyroid nml, No JVD, Trachea midline.  negative:

Thyromegaly, Lymphadenopathy (R), Lymphadenopathy (L), Stiff neck, 

Swelling/bruising, Tracheal deviation


Respiratory: positive: Chest non-tender, No respiratory distress.  negative: 

Wheezes, Rales, Rhonchi


Cardiovascular: positive: Regular rate & rhythm, No murmur, No gallop.  

negative: Irregularly irregular, Extrasystoles, Tachycardia, Bradycardia, JVD p

resent, Systolic murmur, Diastolic murmur


Peripheral Pulses: 2+ Radial (R), 2+ Radial (L), 2+ Dorsalis pedis (R), 2+ 

Dorsalis pedis (L)


Abdomen: positive: Non-tender, No organomegaly, Nml bowel sounds, No distention.

 negative: Tenderness, Guarding, Rebound


Back: positive: Nml inspection.  negative: CVA tenderness (R), CVA tenderness 

(L)


Skin: positive: Color nml, No rash, Warm, Dry.  negative: Cyanosis, Diaphoresis,

Pallor


Extremities: positive: Non-tender.  negative: Calf tenderness, Rachael's 

sign/cords


Neurologic/Psychiatric: negative: Sensory loss, Facial droop, Slurred/abnml 

speech, Depressed mood/affect





- Lab Results


Fish Bones: 


                                 04/05/19 11:45





                                 03/15/19 05:31





ABX Reporting


Has patient been on IV antibiotics over the past 48 hours?: No





Sepsis Event Note (H)





- Evaluation


Current Stage of Sepsis: Ruled out





Assessment/Plan





- Problem List


(1) Dementia


Impression: 





stable, Continue Namenda.











(2) NPH (normal pressure hydrocephalus)


Assessment/Plan: 


stable symptoms, continue vital and nurse support








(3) Problem situation relating to social and personal history


Assessment/Plan: 


Now pt is Still awaiting guardianship to become official, POLST to be finalized 

then, and a facility to then accept him for permanent residence.


Qualifiers: 


   Dementia type: unspecified type   Dementia behavioral disturbance: without 

behavioral disturbance   Qualified Code(s): F03.90 - Unspecified dementia 

without behavioral disturbance

## 2019-04-18 RX ADMIN — FAMOTIDINE SCH MG: 20 TABLET, FILM COATED ORAL at 08:18

## 2019-04-18 RX ADMIN — NYSTATIN SCH APPLIC: 100000 POWDER TOPICAL at 20:55

## 2019-04-18 RX ADMIN — NYSTATIN SCH APPLIC: 100000 POWDER TOPICAL at 08:20

## 2019-04-18 RX ADMIN — Medication SCH TAB: at 08:18

## 2019-04-18 RX ADMIN — MEMANTINE HYDROCHLORIDE SCH MG: 5 TABLET ORAL at 20:55

## 2019-04-18 RX ADMIN — POLYETHYLENE GLYCOL 3350 SCH: 17 POWDER, FOR SOLUTION ORAL at 08:19

## 2019-04-18 RX ADMIN — FAMOTIDINE SCH MG: 20 TABLET, FILM COATED ORAL at 20:55

## 2019-04-18 RX ADMIN — MEMANTINE HYDROCHLORIDE SCH MG: 5 TABLET ORAL at 08:18

## 2019-04-18 NOTE — PROVIDER PROGRESS NOTE
Assessment/Plan





- Problem List


(1) Dementia


Qualifiers: 


   Dementia type: unspecified type   Dementia behavioral disturbance: without 

behavioral disturbance   Qualified Code(s): F03.90 - Unspecified dementia 

without behavioral disturbance   


Assessment/Plan: 





Impression: 


pt is stable, pending for d/c





stable, Continue Namenda.











(2) NPH (normal pressure hydrocephalus)


Assessment/Plan: 


stable symptoms, continue vital and nurse support








(3) Problem situation relating to social and personal history


Assessment/Plan: 


Now pt is Still awaiting guardianship to become official, POLST to be finalized 

then, and a facility to then accept him for permanent residence.








- Current Meds


Current Meds: 





                               Current Medications











Generic Name Dose Route Start Last Admin





  Trade Name Freq  PRN Reason Stop Dose Admin


 


Acetaminophen  650 mg  03/10/19 11:56  03/31/19 18:55





  Tylenol  PO   650 mg





  Q4HR PRN   Administration





  Pain 1 to 4   





     





     





     


 


Famotidine  20 mg  03/10/19 21:00  04/18/19 08:18





  Pepcid  PO   20 mg





  BID JAISON   Administration





     





     





     





     


 


Memantine  5 mg  03/10/19 21:00  04/18/19 08:18





  Namenda  PO   5 mg





  BID JAISON   Administration





     





     





     





     


 


Multivitamins/Minerals  1 tab  03/11/19 16:00  04/18/19 08:18





  Theragran  M  PO   1 tab





  DAILYWM JAISON   Administration





     





     





     





     


 


Nystatin  1 applic  04/07/19 21:00  04/18/19 08:20





  Nystop  TOP   1 applic





  BID JAISON   Administration





     





     





     





     


 


Polyethylene Glycol  17 gm  03/11/19 09:00  04/18/19 08:19





  Miralax  PO   Not Given





  DAILY JAISON   





     





     





     





     


 


Trazodone HCl  50 mg  03/11/19 21:44  04/15/19 20:09





  Desyrel  PO   50 mg





  QPM PRN   Administration





  Insomnia   





     





     





     














- Lab Result


Fish Bone Diagrams: 


                                 04/05/19 11:45





                                 03/15/19 05:31





Subjective





- Subjective


Patient Reports: Feeling Better





Objective


Vital Signs: 





                                     Oxygen











O2 Source                      Room air














I&O (Last 24 Hrs): 





                          Intake and Output Totals x24h











 04/16/19 04/17/19 04/18/19





 23:59 23:59 23:59


 


Intake Total 1310 960 480


 


Output Total 1  


 


Balance 1309 960 480














- Results


Results: 





                               Laboratory Results











WBC  5.5 x10^3/uL (4.8-10.8)   04/05/19  11:45    


 


RBC  3.55 10^6/uL (4.70-6.10)  L  04/05/19  11:45    


 


Hgb  12.1 g/dL (14.0-18.0)  L  04/05/19  11:45    


 


Hct  36.6 % (42.0-52.0)  L  04/05/19  11:45    


 


MCV  103.0 fL (80.0-94.0)  H  04/05/19  11:45    


 


MCH  34.2 pg (27.0-31.0)  H  04/05/19  11:45    


 


MCHC  33.2 g/dL (32.0-36.0)   04/05/19  11:45    


 


RDW  14.9 % (12.0-15.0)   04/05/19  11:45    


 


Plt Count  216 10^3/uL (130-450)   04/05/19  11:45    


 


MPV  7.9 fL (7.4-11.4)   04/05/19  11:45    


 


Neut # (Auto)  3.4 10^3/uL (1.5-6.6)   04/05/19  11:45    


 


Lymph # (Auto)  1.0 10^3/uL (1.5-3.5)  L  04/05/19  11:45    


 


Mono # (Auto)  0.6 10^3/uL (0.0-1.0)   04/05/19  11:45    


 


Eos # (Auto)  0.5 10^3/uL (0.0-0.7)   04/05/19  11:45    


 


Baso # (Auto)  0.1 10^3/uL (0.0-0.1)   04/05/19  11:45    


 


Absolute Nucleated RBC  0.01 x10^3/uL  04/05/19  11:45    


 


Total Counted  100   03/13/19  05:05    


 


Band Neuts % (Manual)  0 % (0-10)  03/13/19  05:05    


 


Reactive Lymphs % (Man)  6 %  03/06/19  08:37    


 


Abnorm Lymph % (Manual)  0 %  03/13/19  05:05    


 


Nucleated RBC %  0.1 /100WBC  04/05/19  11:45    


 


Neutrophils # (Manual)  1.5 10^3/uL (1.5-6.6)   03/13/19  05:05    


 


Lymphocytes # (Manual)  0.6 10^3/uL (1.5-3.5)  L  03/13/19  05:05    


 


Monocytes # (Manual)  0.3 10^3/uL (0.0-1.0)   03/13/19  05:05    


 


Eosinophils # (Manual)  0.0 10^3/uL (0-0.7)   03/13/19  05:05    


 


Basophils # (Manual)  0.0 10^3/uL (0-0.1)   03/13/19  05:05    


 


Differential Comment  MANUAL DIFFERENTIAL   03/06/19  08:37    


 


Manual Slide Review  Indicated   03/14/19  04:20    


 


Platelet Estimate  DECREASED (<130,000)  (NORMAL)   03/14/19  04:20    


 


Platelet Morphology  RARE GIANT PLATELETS  (NORMAL)   03/06/19  08:37    


 


RBC Morph Micro Appear  1+ ANISOCYTOSIS  (NORMAL)   03/14/19  04:20    


 


PT  12.7 secs (9.9-12.6)  H  04/05/19  11:45    


 


INR  1.1  (0.8-1.2)   04/05/19  11:45    


 


Sodium  139 mmol/L (135-145)   03/15/19  05:31    


 


Potassium  3.6 mmol/L (3.5-5.0)   03/15/19  05:31    


 


Chloride  105 mmol/L (101-111)   03/15/19  05:31    


 


Carbon Dioxide  26 mmol/L (21-32)   03/15/19  05:31    


 


Anion Gap  8.0  (6-13)   03/15/19  05:31    


 


BUN  15 mg/dL (6-20)   03/15/19  05:31    


 


Creatinine  0.9 mg/dL (0.6-1.2)   03/15/19  05:31    


 


Estimated GFR (MDRD)  82  (>89)  L  03/15/19  05:31    


 


Glucose  93 mg/dL ()   03/15/19  05:31    


 


POC Whole Bld Glucose  70 mg/dL (70 - 100)   03/09/19  11:34    


 


Lactic Acid  1.0 mmol/L (0.5-2.2)   03/10/19  09:15    


 


Calcium  7.9 mg/dL (8.5-10.3)  L  03/15/19  05:31    


 


Phosphorus  1.7 mg/dL (2.5-4.6)  L  03/12/19  06:07    


 


Total Bilirubin  0.6 mg/dL (0.2-1.0)   03/09/19  13:37    


 


AST  30 IU/L (10-42)   03/09/19  13:37    


 


ALT  14 IU/L (10-60)   03/09/19  13:37    


 


Alkaline Phosphatase  98 IU/L ()   03/09/19  13:37    


 


Total Creatine Kinase  122 IU/L ()   03/06/19  08:37    


 


Troponin I  < 0.04 ng/mL (<0.49)   03/09/19  13:37    


 


Total Protein  6.6 g/dL (6.7-8.2)  L  03/09/19  13:37    


 


Albumin  2.7 g/dL (3.2-5.5)  L  03/12/19  06:07    


 


Globulin  3.2 g/dL (2.1-4.2)   03/09/19  13:37    


 


Albumin/Globulin Ratio  1.1  (1.0-2.2)   03/09/19  13:37    


 


Lipase  30 U/L (22-51)   03/09/19  13:37    


 


Urine Color  YELLOW   03/08/19  18:29    


 


Urine Clarity  CLEAR  (CLEAR)   03/08/19  18:29    


 


Urine pH  6.0 PH (5.0-7.5)   03/08/19  18:29    


 


Ur Specific Gravity  1.025  (1.002-1.030)   03/08/19  18:29    


 


Urine Protein  NEGATIVE mg/dL (NEGATIVE)   03/08/19  18:29    


 


Urine Glucose (UA)  NEGATIVE mg/dL (NEGATIVE)   03/08/19  18:29    


 


Urine Ketones  15 mg/dL (NEGATIVE)  H  03/08/19  18:29    


 


Urine Occult Blood  SMALL  (NEGATIVE)  H  03/08/19  18:29    


 


Urine Nitrite  NEGATIVE  (NEGATIVE)   03/08/19  18:29    


 


Urine Bilirubin  NEGATIVE  (NEGATIVE)   03/08/19  18:29    


 


Urine Urobilinogen  0.2 (NORMAL) E.U./dL (NORMAL)   03/08/19  18:29    


 


Ur Leukocyte Esterase  NEGATIVE  (NEGATIVE)   03/08/19  18:29    


 


Urine RBC  6-10 /HPF (0-5)  H  03/08/19  18:29    


 


Urine WBC  0-3 /HPF (0-3)   03/08/19  18:29    


 


Ur Squamous Epith Cells  RARE Squamous  (<= Few)   03/08/19  18:29    


 


Urine Bacteria  Rare /HPF (None Seen)   03/08/19  18:29    


 


Urine Mucus  Few Strands   03/08/19  18:29    


 


Ur Microscopic Review  INDICATED   03/08/19  18:29    


 


Urine Culture Comments  NOT INDICATED   03/08/19  18:29    


 


Last Dose Date  UNK   03/12/19  09:30    


 


Last Dose Time  UNK   03/12/19  09:30    


 


Vancomycin Trough  17.4 ug/mL (10.0-20.0)   03/12/19  09:30    


 


Urine Opiates Screen  NEGATIVE  (NEGATIVE)   03/04/19  00:00    


 


Ur Oxycodone Screen  NEGATIVE  (NEGATIVE)   03/04/19  00:00    


 


Urine Methadone Screen  NEGATIVE  (NEGATIVE)   03/04/19  00:00    


 


Ur Propoxyphene Screen  NEGATIVE  (NEGATIVE)   03/04/19  00:00    


 


Ur Barbiturates Screen  NEGATIVE  (NEGATIVE)   03/04/19  00:00    


 


Ur Tricyclics Screen  NEGATIVE  (NEGATIVE)   03/04/19  00:00    


 


Ur Phencyclidine Scrn  NEGATIVE  (NEGATIVE)   03/04/19  00:00    


 


Ur Amphetamine Screen  NEGATIVE  (NEGATIVE)   03/04/19  00:00    


 


U Methamphetamines Scrn  NEGATIVE  (NEGATIVE)   03/04/19  00:00    


 


U Benzodiazepines Scrn  NEGATIVE  (NEGATIVE)   03/04/19  00:00    


 


Urine Cocaine Screen  NEGATIVE  (NEGATIVE)   03/04/19  00:00    


 


U Cannabinoids Screen  NEGATIVE  (NEGATIVE)   03/04/19  00:00    


 


C. difficile Tox B Gene  NEGATIVE  (NEGATIVE)   03/10/19  07:43    














Sepsis Event Note (H)





- Evaluation


Current Stage of Sepsis: Ruled out





ABX Reporting


Has patient been on IV antibiotics over the past 48 hours?: No





Current Medications





- Current Medications


Current Medications: 





Active Medications





Acetaminophen (Tylenol)  650 mg PO Q4HR PRN


   PRN Reason: Pain 1 to 4


   Last Admin: 03/31/19 18:55 Dose:  650 mg


Famotidine (Pepcid)  20 mg PO BID [facility]


   Last Admin: 04/18/19 08:18 Dose:  20 mg


Memantine (Namenda)  5 mg PO BID [facility]


   Last Admin: 04/18/19 08:18 Dose:  5 mg


Multi-Ingredient Ointment (Zinc Oxide)  1 applic TOP PRN PRN


   PRN Reason: Skin Care


Multivitamins/Minerals (Theragran  M)  1 tab PO DAILYWM [facility]


   Last Admin: 04/18/19 08:18 Dose:  1 tab


Nystatin (Nystop)  1 applic TOP BID [facility]


   Last Admin: 04/18/19 08:20 Dose:  1 applic


Polyethylene Glycol (Miralax)  17 gm PO DAILY [facility]


   Last Admin: 04/18/19 08:19 Dose:  Not Given


Trazodone HCl (Desyrel)  50 mg PO QPM PRN


   PRN Reason: Insomnia


   Last Admin: 04/15/19 20:09 Dose:  50 mg





                                        





No Known Home Medications  06/18/18

## 2019-04-19 RX ADMIN — Medication SCH TAB: at 08:07

## 2019-04-19 RX ADMIN — FAMOTIDINE SCH MG: 20 TABLET, FILM COATED ORAL at 08:07

## 2019-04-19 RX ADMIN — MEMANTINE HYDROCHLORIDE SCH MG: 5 TABLET ORAL at 08:07

## 2019-04-19 RX ADMIN — NYSTATIN SCH APPLIC: 100000 POWDER TOPICAL at 21:06

## 2019-04-19 RX ADMIN — MEMANTINE HYDROCHLORIDE SCH MG: 5 TABLET ORAL at 21:06

## 2019-04-19 RX ADMIN — FAMOTIDINE SCH MG: 20 TABLET, FILM COATED ORAL at 21:06

## 2019-04-19 RX ADMIN — NYSTATIN SCH APPLIC: 100000 POWDER TOPICAL at 08:07

## 2019-04-19 RX ADMIN — POLYETHYLENE GLYCOL 3350 SCH GM: 17 POWDER, FOR SOLUTION ORAL at 08:07

## 2019-04-19 NOTE — PROVIDER PROGRESS NOTE
Assessment/Plan





- Problem List


(1) Dementia


Qualifiers: 


   Dementia type: unspecified type   Dementia behavioral disturbance: without 

behavioral disturbance   Qualified Code(s): F03.90 - Unspecified dementia 

without behavioral disturbance   


Assessment/Plan: 





pt is stable, had a good appetite. pt is planed to d/c on next Monday





pt is stable, pending for d/c





stable, Continue Namenda.











(2) NPH (normal pressure hydrocephalus)


Assessment/Plan: 


stable symptoms, continue vital and nurse support








(3) Problem situation relating to social and personal history


Assessment/Plan: 


Now pt is Still awaiting guardianship to become official, POLST to be finalized 

then, and a facility to then accept him for permanent residence.











- Current Meds


Current Meds: 





                               Current Medications











Generic Name Dose Route Start Last Admin





  Trade Name Freq  PRN Reason Stop Dose Admin


 


Acetaminophen  650 mg  03/10/19 11:56  03/31/19 18:55





  Tylenol  PO   650 mg





  Q4HR PRN   Administration





  Pain 1 to 4   





     





     





     


 


Famotidine  20 mg  03/10/19 21:00  04/19/19 08:07





  Pepcid  PO   20 mg





  BID JAISON   Administration





     





     





     





     


 


Memantine  5 mg  03/10/19 21:00  04/19/19 08:07





  Namenda  PO   5 mg





  BID JAISON   Administration





     





     





     





     


 


Multivitamins/Minerals  1 tab  03/11/19 16:00  04/19/19 08:07





  Theragran  M  PO   1 tab





  DAILYWM JAISON   Administration





     





     





     





     


 


Nystatin  1 applic  04/07/19 21:00  04/19/19 08:07





  Nystop  TOP   1 applic





  BID JAISON   Administration





     





     





     





     


 


Polyethylene Glycol  17 gm  03/11/19 09:00  04/19/19 08:07





  Miralax  PO   17 gm





  DAILY JAISON   Administration





     





     





     





     


 


Trazodone HCl  50 mg  03/11/19 21:44  04/15/19 20:09





  Desyrel  PO   50 mg





  QPM PRN   Administration





  Insomnia   





     





     





     














- Lab Result


Fish Bone Diagrams: 


                                 04/05/19 11:45





                                 03/15/19 05:31





Objective


Vital Signs: 





                               Vital Signs - 24 hr











  04/19/19





  08:11


 


Temperature 36.6 C


 


Heart Rate [ 58 L





Brachial] 


 


Respiratory 18





Rate 


 


Blood Pressure 136/82 H





[Right Brachial 





artery] 


 


O2 Saturation 96








                                     Oxygen











O2 Source                      Room air














I&O (Last 24 Hrs): 





                          Intake and Output Totals x24h











 04/17/19 04/18/19 04/19/19





 23:59 23:59 23:59


 


Intake Total 960 720 880


 


Balance 960 720 880














- Results


Results: 





                               Laboratory Results











WBC  5.5 x10^3/uL (4.8-10.8)   04/05/19  11:45    


 


RBC  3.55 10^6/uL (4.70-6.10)  L  04/05/19  11:45    


 


Hgb  12.1 g/dL (14.0-18.0)  L  04/05/19  11:45    


 


Hct  36.6 % (42.0-52.0)  L  04/05/19  11:45    


 


MCV  103.0 fL (80.0-94.0)  H  04/05/19  11:45    


 


MCH  34.2 pg (27.0-31.0)  H  04/05/19  11:45    


 


MCHC  33.2 g/dL (32.0-36.0)   04/05/19  11:45    


 


RDW  14.9 % (12.0-15.0)   04/05/19  11:45    


 


Plt Count  216 10^3/uL (130-450)   04/05/19  11:45    


 


MPV  7.9 fL (7.4-11.4)   04/05/19  11:45    


 


Neut # (Auto)  3.4 10^3/uL (1.5-6.6)   04/05/19  11:45    


 


Lymph # (Auto)  1.0 10^3/uL (1.5-3.5)  L  04/05/19  11:45    


 


Mono # (Auto)  0.6 10^3/uL (0.0-1.0)   04/05/19  11:45    


 


Eos # (Auto)  0.5 10^3/uL (0.0-0.7)   04/05/19  11:45    


 


Baso # (Auto)  0.1 10^3/uL (0.0-0.1)   04/05/19  11:45    


 


Absolute Nucleated RBC  0.01 x10^3/uL  04/05/19  11:45    


 


Total Counted  100   03/13/19  05:05    


 


Band Neuts % (Manual)  0 % (0-10)  03/13/19  05:05    


 


Reactive Lymphs % (Man)  6 %  03/06/19  08:37    


 


Abnorm Lymph % (Manual)  0 %  03/13/19  05:05    


 


Nucleated RBC %  0.1 /100WBC  04/05/19  11:45    


 


Neutrophils # (Manual)  1.5 10^3/uL (1.5-6.6)   03/13/19  05:05    


 


Lymphocytes # (Manual)  0.6 10^3/uL (1.5-3.5)  L  03/13/19  05:05    


 


Monocytes # (Manual)  0.3 10^3/uL (0.0-1.0)   03/13/19  05:05    


 


Eosinophils # (Manual)  0.0 10^3/uL (0-0.7)   03/13/19  05:05    


 


Basophils # (Manual)  0.0 10^3/uL (0-0.1)   03/13/19  05:05    


 


Differential Comment  MANUAL DIFFERENTIAL   03/06/19  08:37    


 


Manual Slide Review  Indicated   03/14/19  04:20    


 


Platelet Estimate  DECREASED (<130,000)  (NORMAL)   03/14/19  04:20    


 


Platelet Morphology  RARE GIANT PLATELETS  (NORMAL)   03/06/19  08:37    


 


RBC Morph Micro Appear  1+ ANISOCYTOSIS  (NORMAL)   03/14/19  04:20    


 


PT  12.7 secs (9.9-12.6)  H  04/05/19  11:45    


 


INR  1.1  (0.8-1.2)   04/05/19  11:45    


 


Sodium  139 mmol/L (135-145)   03/15/19  05:31    


 


Potassium  3.6 mmol/L (3.5-5.0)   03/15/19  05:31    


 


Chloride  105 mmol/L (101-111)   03/15/19  05:31    


 


Carbon Dioxide  26 mmol/L (21-32)   03/15/19  05:31    


 


Anion Gap  8.0  (6-13)   03/15/19  05:31    


 


BUN  15 mg/dL (6-20)   03/15/19  05:31    


 


Creatinine  0.9 mg/dL (0.6-1.2)   03/15/19  05:31    


 


Estimated GFR (MDRD)  82  (>89)  L  03/15/19  05:31    


 


Glucose  93 mg/dL ()   03/15/19  05:31    


 


POC Whole Bld Glucose  70 mg/dL (70 - 100)   03/09/19  11:34    


 


Lactic Acid  1.0 mmol/L (0.5-2.2)   03/10/19  09:15    


 


Calcium  7.9 mg/dL (8.5-10.3)  L  03/15/19  05:31    


 


Phosphorus  1.7 mg/dL (2.5-4.6)  L  03/12/19  06:07    


 


Total Bilirubin  0.6 mg/dL (0.2-1.0)   03/09/19  13:37    


 


AST  30 IU/L (10-42)   03/09/19  13:37    


 


ALT  14 IU/L (10-60)   03/09/19  13:37    


 


Alkaline Phosphatase  98 IU/L ()   03/09/19  13:37    


 


Total Creatine Kinase  122 IU/L ()   03/06/19  08:37    


 


Troponin I  < 0.04 ng/mL (<0.49)   03/09/19  13:37    


 


Total Protein  6.6 g/dL (6.7-8.2)  L  03/09/19  13:37    


 


Albumin  2.7 g/dL (3.2-5.5)  L  03/12/19  06:07    


 


Globulin  3.2 g/dL (2.1-4.2)   03/09/19  13:37    


 


Albumin/Globulin Ratio  1.1  (1.0-2.2)   03/09/19  13:37    


 


Lipase  30 U/L (22-51)   03/09/19  13:37    


 


Urine Color  YELLOW   03/08/19  18:29    


 


Urine Clarity  CLEAR  (CLEAR)   03/08/19  18:29    


 


Urine pH  6.0 PH (5.0-7.5)   03/08/19  18:29    


 


Ur Specific Gravity  1.025  (1.002-1.030)   03/08/19  18:29    


 


Urine Protein  NEGATIVE mg/dL (NEGATIVE)   03/08/19  18:29    


 


Urine Glucose (UA)  NEGATIVE mg/dL (NEGATIVE)   03/08/19  18:29    


 


Urine Ketones  15 mg/dL (NEGATIVE)  H  03/08/19  18:29    


 


Urine Occult Blood  SMALL  (NEGATIVE)  H  03/08/19  18:29    


 


Urine Nitrite  NEGATIVE  (NEGATIVE)   03/08/19  18:29    


 


Urine Bilirubin  NEGATIVE  (NEGATIVE)   03/08/19  18:29    


 


Urine Urobilinogen  0.2 (NORMAL) E.U./dL (NORMAL)   03/08/19  18:29    


 


Ur Leukocyte Esterase  NEGATIVE  (NEGATIVE)   03/08/19  18:29    


 


Urine RBC  6-10 /HPF (0-5)  H  03/08/19  18:29    


 


Urine WBC  0-3 /HPF (0-3)   03/08/19  18:29    


 


Ur Squamous Epith Cells  RARE Squamous  (<= Few)   03/08/19  18:29    


 


Urine Bacteria  Rare /HPF (None Seen)   03/08/19  18:29    


 


Urine Mucus  Few Strands   03/08/19  18:29    


 


Ur Microscopic Review  INDICATED   03/08/19  18:29    


 


Urine Culture Comments  NOT INDICATED   03/08/19  18:29    


 


Last Dose Date  UNK   03/12/19  09:30    


 


Last Dose Time  UNK   03/12/19  09:30    


 


Vancomycin Trough  17.4 ug/mL (10.0-20.0)   03/12/19  09:30    


 


Urine Opiates Screen  NEGATIVE  (NEGATIVE)   03/04/19  00:00    


 


Ur Oxycodone Screen  NEGATIVE  (NEGATIVE)   03/04/19  00:00    


 


Urine Methadone Screen  NEGATIVE  (NEGATIVE)   03/04/19  00:00    


 


Ur Propoxyphene Screen  NEGATIVE  (NEGATIVE)   03/04/19  00:00    


 


Ur Barbiturates Screen  NEGATIVE  (NEGATIVE)   03/04/19  00:00    


 


Ur Tricyclics Screen  NEGATIVE  (NEGATIVE)   03/04/19  00:00    


 


Ur Phencyclidine Scrn  NEGATIVE  (NEGATIVE)   03/04/19  00:00    


 


Ur Amphetamine Screen  NEGATIVE  (NEGATIVE)   03/04/19  00:00    


 


U Methamphetamines Scrn  NEGATIVE  (NEGATIVE)   03/04/19  00:00    


 


U Benzodiazepines Scrn  NEGATIVE  (NEGATIVE)   03/04/19  00:00    


 


Urine Cocaine Screen  NEGATIVE  (NEGATIVE)   03/04/19  00:00    


 


U Cannabinoids Screen  NEGATIVE  (NEGATIVE)   03/04/19  00:00    


 


C. difficile Tox B Gene  NEGATIVE  (NEGATIVE)   03/10/19  07:43    














Sepsis Event Note (H)





- Evaluation


Current Stage of Sepsis: Ruled out





ABX Reporting


Has patient been on IV antibiotics over the past 48 hours?: No

## 2019-04-20 RX ADMIN — NYSTATIN SCH APPLIC: 100000 POWDER TOPICAL at 21:20

## 2019-04-20 RX ADMIN — MEMANTINE HYDROCHLORIDE SCH MG: 5 TABLET ORAL at 21:20

## 2019-04-20 RX ADMIN — FAMOTIDINE SCH MG: 20 TABLET, FILM COATED ORAL at 09:59

## 2019-04-20 RX ADMIN — FAMOTIDINE SCH MG: 20 TABLET, FILM COATED ORAL at 21:20

## 2019-04-20 RX ADMIN — NYSTATIN SCH APPLIC: 100000 POWDER TOPICAL at 10:00

## 2019-04-20 RX ADMIN — POLYETHYLENE GLYCOL 3350 SCH GM: 17 POWDER, FOR SOLUTION ORAL at 09:59

## 2019-04-20 RX ADMIN — MEMANTINE HYDROCHLORIDE SCH MG: 5 TABLET ORAL at 09:59

## 2019-04-20 RX ADMIN — Medication SCH TAB: at 09:59

## 2019-04-20 NOTE — PROVIDER PROGRESS NOTE
Assessment/Plan





- Problem List


(1) Dementia


Qualifiers: 


   Dementia type: unspecified type   Dementia behavioral disturbance: without 

behavioral disturbance   Qualified Code(s): F03.90 - Unspecified dementia 

without behavioral disturbance   


Assessment/Plan: 





stable, pt has no complaint


Continue Namenda. pt is planned to d/c to nurse home [facility] on coming Monday











(2) NPH (normal pressure hydrocephalus)


Assessment/Plan: 


stable, continue vital and nurse support








(3) Problem situation relating to social and personal history


Assessment/Plan: 


Now pt is planned to d/c to [facility] home [facility] on coming Monday








- Current Meds


Current Meds: 





                               Current Medications











Generic Name Dose Route Start Last Admin





  Trade Name Freq  PRN Reason Stop Dose Admin


 


Acetaminophen  650 mg  03/10/19 11:56  03/31/19 18:55





  Tylenol  PO   650 mg





  Q4HR PRN   Administration





  Pain 1 to 4   





     





     





     


 


Famotidine  20 mg  03/10/19 21:00  04/20/19 09:59





  Pepcid  PO   20 mg





  BID JAISON   Administration





     





     





     





     


 


Memantine  5 mg  03/10/19 21:00  04/20/19 09:59





  Namenda  PO   5 mg





  BID JAISON   Administration





     





     





     





     


 


Multivitamins/Minerals  1 tab  03/11/19 16:00  04/20/19 09:59





  Theragran  M  PO   1 tab





  DAILYWM JAISON   Administration





     





     





     





     


 


Nystatin  1 applic  04/07/19 21:00  04/20/19 10:00





  Nystop  TOP   1 applic





  BID JAISON   Administration





     





     





     





     


 


Polyethylene Glycol  17 gm  03/11/19 09:00  04/20/19 09:59





  Miralax  PO   17 gm





  DAILY JAISON   Administration





     





     





     





     


 


Trazodone HCl  50 mg  03/11/19 21:44  04/15/19 20:09





  Desyrel  PO   50 mg





  QPM PRN   Administration





  Insomnia   





     





     





     














- Lab Result


Fish Bone Diagrams: 


                                 04/05/19 11:45





                                 03/15/19 05:31





Objective


Vital Signs: 





                               Vital Signs - 24 hr











  04/20/19





  08:18


 


Temperature 36.6 C


 


Heart Rate [ 58 L





Brachial] 


 


Respiratory 16





Rate 


 


Blood Pressure 136/68 H





[Right Brachial 





artery] 


 


O2 Saturation 93








                                     Oxygen











O2 Source                      Room air














I&O (Last 24 Hrs): 





                          Intake and Output Totals x24h











 04/18/19 04/19/19 04/20/19





 23:59 23:59 23:59


 


Intake Total 720 1330 720


 


Balance 720 1330 720














- Results


Results: 





                               Laboratory Results











WBC  5.5 x10^3/uL (4.8-10.8)   04/05/19  11:45    


 


RBC  3.55 10^6/uL (4.70-6.10)  L  04/05/19  11:45    


 


Hgb  12.1 g/dL (14.0-18.0)  L  04/05/19  11:45    


 


Hct  36.6 % (42.0-52.0)  L  04/05/19  11:45    


 


MCV  103.0 fL (80.0-94.0)  H  04/05/19  11:45    


 


MCH  34.2 pg (27.0-31.0)  H  04/05/19  11:45    


 


MCHC  33.2 g/dL (32.0-36.0)   04/05/19  11:45    


 


RDW  14.9 % (12.0-15.0)   04/05/19  11:45    


 


Plt Count  216 10^3/uL (130-450)   04/05/19  11:45    


 


MPV  7.9 fL (7.4-11.4)   04/05/19  11:45    


 


Neut # (Auto)  3.4 10^3/uL (1.5-6.6)   04/05/19  11:45    


 


Lymph # (Auto)  1.0 10^3/uL (1.5-3.5)  L  04/05/19  11:45    


 


Mono # (Auto)  0.6 10^3/uL (0.0-1.0)   04/05/19  11:45    


 


Eos # (Auto)  0.5 10^3/uL (0.0-0.7)   04/05/19  11:45    


 


Baso # (Auto)  0.1 10^3/uL (0.0-0.1)   04/05/19  11:45    


 


Absolute Nucleated RBC  0.01 x10^3/uL  04/05/19  11:45    


 


Total Counted  100   03/13/19  05:05    


 


Band Neuts % (Manual)  0 % (0-10)  03/13/19  05:05    


 


Reactive Lymphs % (Man)  6 %  03/06/19  08:37    


 


Abnorm Lymph % (Manual)  0 %  03/13/19  05:05    


 


Nucleated RBC %  0.1 /100WBC  04/05/19  11:45    


 


Neutrophils # (Manual)  1.5 10^3/uL (1.5-6.6)   03/13/19  05:05    


 


Lymphocytes # (Manual)  0.6 10^3/uL (1.5-3.5)  L  03/13/19  05:05    


 


Monocytes # (Manual)  0.3 10^3/uL (0.0-1.0)   03/13/19  05:05    


 


Eosinophils # (Manual)  0.0 10^3/uL (0-0.7)   03/13/19  05:05    


 


Basophils # (Manual)  0.0 10^3/uL (0-0.1)   03/13/19  05:05    


 


Differential Comment  MANUAL DIFFERENTIAL   03/06/19  08:37    


 


Manual Slide Review  Indicated   03/14/19  04:20    


 


Platelet Estimate  DECREASED (<130,000)  (NORMAL)   03/14/19  04:20    


 


Platelet Morphology  RARE GIANT PLATELETS  (NORMAL)   03/06/19  08:37    


 


RBC Morph Micro Appear  1+ ANISOCYTOSIS  (NORMAL)   03/14/19  04:20    


 


PT  12.7 secs (9.9-12.6)  H  04/05/19  11:45    


 


INR  1.1  (0.8-1.2)   04/05/19  11:45    


 


Sodium  139 mmol/L (135-145)   03/15/19  05:31    


 


Potassium  3.6 mmol/L (3.5-5.0)   03/15/19  05:31    


 


Chloride  105 mmol/L (101-111)   03/15/19  05:31    


 


Carbon Dioxide  26 mmol/L (21-32)   03/15/19  05:31    


 


Anion Gap  8.0  (6-13)   03/15/19  05:31    


 


BUN  15 mg/dL (6-20)   03/15/19  05:31    


 


Creatinine  0.9 mg/dL (0.6-1.2)   03/15/19  05:31    


 


Estimated GFR (MDRD)  82  (>89)  L  03/15/19  05:31    


 


Glucose  93 mg/dL ()   03/15/19  05:31    


 


POC Whole Bld Glucose  70 mg/dL (70 - 100)   03/09/19  11:34    


 


Lactic Acid  1.0 mmol/L (0.5-2.2)   03/10/19  09:15    


 


Calcium  7.9 mg/dL (8.5-10.3)  L  03/15/19  05:31    


 


Phosphorus  1.7 mg/dL (2.5-4.6)  L  03/12/19  06:07    


 


Total Bilirubin  0.6 mg/dL (0.2-1.0)   03/09/19  13:37    


 


AST  30 IU/L (10-42)   03/09/19  13:37    


 


ALT  14 IU/L (10-60)   03/09/19  13:37    


 


Alkaline Phosphatase  98 IU/L ()   03/09/19  13:37    


 


Total Creatine Kinase  122 IU/L ()   03/06/19  08:37    


 


Troponin I  < 0.04 ng/mL (<0.49)   03/09/19  13:37    


 


Total Protein  6.6 g/dL (6.7-8.2)  L  03/09/19  13:37    


 


Albumin  2.7 g/dL (3.2-5.5)  L  03/12/19  06:07    


 


Globulin  3.2 g/dL (2.1-4.2)   03/09/19  13:37    


 


Albumin/Globulin Ratio  1.1  (1.0-2.2)   03/09/19  13:37    


 


Lipase  30 U/L (22-51)   03/09/19  13:37    


 


Urine Color  YELLOW   03/08/19  18:29    


 


Urine Clarity  CLEAR  (CLEAR)   03/08/19  18:29    


 


Urine pH  6.0 PH (5.0-7.5)   03/08/19  18:29    


 


Ur Specific Gravity  1.025  (1.002-1.030)   03/08/19  18:29    


 


Urine Protein  NEGATIVE mg/dL (NEGATIVE)   03/08/19  18:29    


 


Urine Glucose (UA)  NEGATIVE mg/dL (NEGATIVE)   03/08/19  18:29    


 


Urine Ketones  15 mg/dL (NEGATIVE)  H  03/08/19  18:29    


 


Urine Occult Blood  SMALL  (NEGATIVE)  H  03/08/19  18:29    


 


Urine Nitrite  NEGATIVE  (NEGATIVE)   03/08/19  18:29    


 


Urine Bilirubin  NEGATIVE  (NEGATIVE)   03/08/19  18:29    


 


Urine Urobilinogen  0.2 (NORMAL) E.U./dL (NORMAL)   03/08/19  18:29    


 


Ur Leukocyte Esterase  NEGATIVE  (NEGATIVE)   03/08/19  18:29    


 


Urine RBC  6-10 /HPF (0-5)  H  03/08/19  18:29    


 


Urine WBC  0-3 /HPF (0-3)   03/08/19  18:29    


 


Ur Squamous Epith Cells  RARE Squamous  (<= Few)   03/08/19  18:29    


 


Urine Bacteria  Rare /HPF (None Seen)   03/08/19  18:29    


 


Urine Mucus  Few Strands   03/08/19  18:29    


 


Ur Microscopic Review  INDICATED   03/08/19  18:29    


 


Urine Culture Comments  NOT INDICATED   03/08/19  18:29    


 


Last Dose Date  UNK   03/12/19  09:30    


 


Last Dose Time  UNK   03/12/19  09:30    


 


Vancomycin Trough  17.4 ug/mL (10.0-20.0)   03/12/19  09:30    


 


Urine Opiates Screen  NEGATIVE  (NEGATIVE)   03/04/19  00:00    


 


Ur Oxycodone Screen  NEGATIVE  (NEGATIVE)   03/04/19  00:00    


 


Urine Methadone Screen  NEGATIVE  (NEGATIVE)   03/04/19  00:00    


 


Ur Propoxyphene Screen  NEGATIVE  (NEGATIVE)   03/04/19  00:00    


 


Ur Barbiturates Screen  NEGATIVE  (NEGATIVE)   03/04/19  00:00    


 


Ur Tricyclics Screen  NEGATIVE  (NEGATIVE)   03/04/19  00:00    


 


Ur Phencyclidine Scrn  NEGATIVE  (NEGATIVE)   03/04/19  00:00    


 


Ur Amphetamine Screen  NEGATIVE  (NEGATIVE)   03/04/19  00:00    


 


U Methamphetamines Scrn  NEGATIVE  (NEGATIVE)   03/04/19  00:00    


 


U Benzodiazepines Scrn  NEGATIVE  (NEGATIVE)   03/04/19  00:00    


 


Urine Cocaine Screen  NEGATIVE  (NEGATIVE)   03/04/19  00:00    


 


U Cannabinoids Screen  NEGATIVE  (NEGATIVE)   03/04/19  00:00    


 


C. difficile Tox B Gene  NEGATIVE  (NEGATIVE)   03/10/19  07:43    














Sepsis Event Note (H)





- Evaluation


Current Stage of Sepsis: Ruled out





ABX Reporting


Has patient been on IV antibiotics over the past 48 hours?: No

## 2019-04-21 VITALS — DIASTOLIC BLOOD PRESSURE: 65 MMHG | SYSTOLIC BLOOD PRESSURE: 133 MMHG

## 2019-04-21 RX ADMIN — POLYETHYLENE GLYCOL 3350 SCH GM: 17 POWDER, FOR SOLUTION ORAL at 09:46

## 2019-04-21 RX ADMIN — NYSTATIN SCH APPLIC: 100000 POWDER TOPICAL at 21:54

## 2019-04-21 RX ADMIN — MEMANTINE HYDROCHLORIDE SCH MG: 5 TABLET ORAL at 21:54

## 2019-04-21 RX ADMIN — Medication SCH TAB: at 09:44

## 2019-04-21 RX ADMIN — FAMOTIDINE SCH MG: 20 TABLET, FILM COATED ORAL at 09:44

## 2019-04-21 RX ADMIN — MEMANTINE HYDROCHLORIDE SCH MG: 5 TABLET ORAL at 09:44

## 2019-04-21 RX ADMIN — FAMOTIDINE SCH MG: 20 TABLET, FILM COATED ORAL at 21:54

## 2019-04-21 RX ADMIN — NYSTATIN SCH APPLIC: 100000 POWDER TOPICAL at 09:47

## 2019-04-21 NOTE — PROVIDER PROGRESS NOTE
Subjective





- Prog Note Date


Prog Note Date: 04/21/19





- Subjective


Pt reports feeling: No change


Subjective: 


pt had nose ulcer for 7 yrs. he report the ulcer from the score which was hurt 

by his glasses. It seem uninfected, and chronic ulcer. 








Current Medications





- Current Medications


Current Medications: 





Active Medications





Acetaminophen (Tylenol)  650 mg PO Q4HR PRN


   PRN Reason: Pain 1 to 4


   Last Admin: 03/31/19 18:55 Dose:  650 mg


Famotidine (Pepcid)  20 mg PO BID [facility]


   Last Admin: 04/21/19 09:44 Dose:  20 mg


Memantine (Namenda)  5 mg PO BID [facility]


   Last Admin: 04/21/19 09:44 Dose:  5 mg


Multi-Ingredient Ointment (Zinc Oxide)  1 applic TOP PRN PRN


   PRN Reason: Skin Care


Multivitamins/Minerals (Theragran  M)  1 tab PO DAILYWM [facility]


   Last Admin: 04/21/19 09:44 Dose:  1 tab


Nystatin (Nystop)  1 applic TOP BID [facility]


   Last Admin: 04/21/19 09:47 Dose:  1 applic


Polyethylene Glycol (Miralax)  17 gm PO DAILY [facility]


   Last Admin: 04/21/19 09:46 Dose:  17 gm


Trazodone HCl (Desyrel)  50 mg PO QPM PRN


   PRN Reason: Insomnia


   Last Admin: 04/15/19 20:09 Dose:  50 mg





                                        





No Known Home Medications  06/18/18 











Objective





- Vital Signs/Intake & Output


Reviewed Vital Signs: Yes


Intake & Output: 


                                 Intake & Output











 04/18/19 04/19/19 04/20/19 04/21/19





 23:59 23:59 23:59 23:59


 


Intake Total 720 1330 2300 1440


 


Output Total    100


 


Balance 720 1330 2300 1340














- Objective


General Appearance: positive: No acute distress, Alert.  negative: Lethargic


Eyes Bilateral: positive: Normal inspection, PERRL, No lid inflammation, 

Conjunctivae nml


ENT: positive: Pharynx nml, No signs of dehydration.  negative: ENT inspection 

nml, Purulent nasal drainage, Pharyngeal erythema, Oral lesions


Neck: positive: Nml inspection, Thyroid nml, No JVD, Trachea midline.  negative:

Thyromegaly, Lymphadenopathy (R), Lymphadenopathy (L), Stiff neck, 

Swelling/bruising, Tracheal deviation


Respiratory: positive: Chest non-tender, No respiratory distress, Breath sounds 

nml.  negative: Wheezes, Rales, Rhonchi


Cardiovascular: positive: Regular rate & rhythm, No murmur, No gallop.  

negative: Irregularly irregular, Extrasystoles, Tachycardia, Bradycardia, JVD 

present, Systolic murmur, Diastolic murmur


Peripheral Pulses: 2+ Radial (R), 2+ Radial (L), 2+ Dorsalis pedis (R), 2+ 

Dorsalis pedis (L)


Abdomen: positive: Non-tender, No organomegaly, Nml bowel sounds, No distention.

 negative: Tenderness, Guarding, Rebound


Back: positive: Nml inspection.  negative: CVA tenderness (R), CVA tenderness 

(L)


Skin: positive: Color nml, No rash, Warm, Dry, Laceration (cm).  negative: 

Cyanosis, Diaphoresis, Pallor


Extremities: positive: Non-tender, Full ROM, Nml appearance.  negative: Calf 

tenderness, Joint swelling, Rachael's sign/cords


Neurologic/Psychiatric: positive: Oriented x3, Motor nml, Sensation nml, 

Mood/affect nml.  negative: Weakness, Sensory loss, Facial droop, Slurred/abnml 

speech, Depressed mood/affect





- Lab Results


Fish Bones: 


                                 04/05/19 11:45





                                 03/15/19 05:31





ABX Reporting


Has patient been on IV antibiotics over the past 48 hours?: No





Sepsis Event Note (H)





- Evaluation


Current Stage of Sepsis: Ruled out





Assessment/Plan





- Problem List


(1) Dementia


Impression: 





stable, pt has no complaint


Continue Namenda. pt is planned to d/c to nurse home [facility] on coming Monday











(2) NPH (normal pressure hydrocephalus)


Assessment/Plan: 


stable, continue vital and nurse support








(3) Problem situation relating to social and personal history


Assessment/Plan: 


Now pt is planned to d/c to nurse home [facility] on coming Monday





(4) ulcer at nose


chronic ulcer, it seems uninfected


order nurse dress change


consult wound care


wound culture, will followup


Qualifiers: 


   Dementia type: unspecified type   Dementia behavioral disturbance: without 

behavioral disturbance   Qualified Code(s): F03.90 - Unspecified dementia 

without behavioral disturbance

## 2019-04-22 LAB
ALBUMIN DIAFP-MCNC: 3.4 G/DL (ref 3.2–5.5)
ALBUMIN/GLOB SERPL: 1 {RATIO} (ref 1–2.2)
ALP SERPL-CCNC: 115 IU/L (ref 42–121)
ALT SERPL W P-5'-P-CCNC: 26 IU/L (ref 10–60)
ANION GAP SERPL CALCULATED.4IONS-SCNC: 6 MMOL/L (ref 6–13)
AST SERPL W P-5'-P-CCNC: 24 IU/L (ref 10–42)
BASOPHILS NFR BLD AUTO: 0.1 10^3/UL (ref 0–0.1)
BASOPHILS NFR BLD AUTO: 1.9 %
BILIRUB BLD-MCNC: 0.5 MG/DL (ref 0.2–1)
BUN SERPL-MCNC: 29 MG/DL (ref 6–20)
CALCIUM UR-MCNC: 9 MG/DL (ref 8.5–10.3)
CHLORIDE SERPL-SCNC: 102 MMOL/L (ref 101–111)
CO2 SERPL-SCNC: 31 MMOL/L (ref 21–32)
CREAT SERPLBLD-SCNC: 1.1 MG/DL (ref 0.6–1.2)
EOSINOPHIL # BLD AUTO: 0.5 10^3/UL (ref 0–0.7)
EOSINOPHIL NFR BLD AUTO: 8.5 %
ERYTHROCYTE [DISTWIDTH] IN BLOOD BY AUTOMATED COUNT: 14.3 % (ref 12–15)
GFRSERPLBLD MDRD-ARVRAT: 65 ML/MIN/{1.73_M2} (ref 89–?)
GLOBULIN SER-MCNC: 3.3 G/DL (ref 2.1–4.2)
GLUCOSE SERPL-MCNC: 99 MG/DL (ref 70–100)
HGB UR QL STRIP: 12.8 G/DL (ref 14–18)
LYMPHOCYTES # SPEC AUTO: 1.2 10^3/UL (ref 1.5–3.5)
LYMPHOCYTES NFR BLD AUTO: 21.3 %
MAGNESIUM SERPL-MCNC: 1.9 MG/DL (ref 1.7–2.8)
MCH RBC QN AUTO: 33.9 PG (ref 27–31)
MCHC RBC AUTO-ENTMCNC: 33 G/DL (ref 32–36)
MCV RBC AUTO: 102.8 FL (ref 80–94)
MONOCYTES # BLD AUTO: 0.5 10^3/UL (ref 0–1)
MONOCYTES NFR BLD AUTO: 9.4 %
NEUTROPHILS # BLD AUTO: 3.3 10^3/UL (ref 1.5–6.6)
NEUTROPHILS # SNV AUTO: 5.6 X10^3/UL (ref 4.8–10.8)
NEUTROPHILS NFR BLD AUTO: 58.9 %
PDW BLD AUTO: 8.6 FL (ref 7.4–11.4)
PLATELET # BLD: 197 10^3/UL (ref 130–450)
PROT SPEC-MCNC: 6.7 G/DL (ref 6.7–8.2)
RBC MAR: 3.77 10^6/UL (ref 4.7–6.1)
SODIUM SERPLBLD-SCNC: 139 MMOL/L (ref 135–145)

## 2019-04-22 RX ADMIN — POLYETHYLENE GLYCOL 3350 SCH: 17 POWDER, FOR SOLUTION ORAL at 08:54

## 2019-04-22 RX ADMIN — MEMANTINE HYDROCHLORIDE SCH MG: 5 TABLET ORAL at 08:54

## 2019-04-22 RX ADMIN — NYSTATIN SCH APPLIC: 100000 POWDER TOPICAL at 08:54

## 2019-04-22 RX ADMIN — Medication SCH TAB: at 08:54

## 2019-04-22 RX ADMIN — FAMOTIDINE SCH MG: 20 TABLET, FILM COATED ORAL at 08:54

## 2019-04-22 NOTE — DISCHARGE PLAN
Discharge Plan for SNF / [facility]





- Discharge Plan And Transition Orders


Disposition: 03 SNF DC/Xfer


Allergies and Adverse Reactions: 


                                    Allergies











Allergy/AdvReac Type Severity Reaction Status Date / Time


 


Sulfa (Sulfonamide Allergy  Unknown Verified 06/18/18 16:06





Antibiotics)     


 


Penicillins AdvReac  Unknown Verified 06/18/18 16:06














- SNF / long-term Transition Orders


Admit to (Facility): [facility]


Under the care of (Name): health provider at [facility]


Discharge Diagnosis: 


Dementia, normal pressure hydrocephalus, problem situation relating to social 

and personal history, ulcer at nose





Medicare Certification Statement: 


I do not certify that Post Hospital skilled nursing care is medically necessary 

on a continuing basis for any of the conditions for which she/he is receiving 

care during hospitalization.





Notify PCP of admission and forward orders to primary provider for signature.





Weight on admission and: Daily


Call PCP immediately if weight increases by: 2 kg


Other Notification Orders: 


Call PCP immediately if patient develops dyspnea, chest pain/tightness or edema.





House Bowel Program: Yes


Additional Bowel Program Orders: 


If no BM after 2 days, nurse may give M.O.M. 30ml PO PRN and/or ducolax Supp 1 

MD and/or CINTIA 250mg P.O., and/or senna 1-2 tabs PO.  On day 3 nurse may give 

repeat above order until residents constipation is resolved. 





Annual Influenza Vaccine (between Sept 1st and March 31st): Yes


Two-step PPD per [facility] [id number] or approved exception documents: Yes


Treatments & Other Orders: pt may followup health provider at [facility] when pt is 

arrival to [facility], may followup dermatologist as out-pt for evaluation and 

treatment of pt's nose ulcer, and have dress change as nurse instruction.


Oxygen Orders: PRN


Medication Orders: 





PLEASE REFER TO THE DISCHARGE MEDICATION LIST.








Insulin Orders?: No





- Medications


New Prescriptions: 


Acetaminophen [Tylenol] 650 mg PO Q4HR PRN #20 tablet


 PRN Reason: Pain 1 to 4


Memantine [Namenda] 5 mg PO BID #15 tablet


Multivitamin W/Minerals [Theragran  M] 1 tab PO DAILYWM #10 tablet


Nystatin [Nystop] 1 applic TOP BID #1 bottle


traZODone [Desyrel] 50 mg PO QPM PRN #10 tablet


 PRN Reason: Insomnia





- Diet


Type: Geriatric


Texture: Regular


Liquids: Thin


May have monthly special meal: Yes





- Therapies | Activity


Activity: Activity as [facility]


Additional Instructions: 


Please follow-up with plastic surgery at [facility] for further 

evaluation of your facial fractures.  Please call [phone]





Please follow-up with Dr. Matias Beltarn at [facility] with the 

neurosurgery clinic for further evaluation of your normal pressure 

hydrocephalus.  Please call area code [phone] to schedule appointment

## 2019-04-22 NOTE — DISCHARGE SUMMARY
Discharge Summary


Discharge Date: 04/22/19


Discharging Provider: MIGDALIA MARS


Condition at Discharge: Stable


Discharge Disposition: 03 SNF DC/Xfer


Discharge Facility Name: [facility]





- DIAGNOSES


Admission Diagnoses: 


1.  Healthcare associated pneumonia


2.  Suspected aspiration pneumonia with right middle lobe lower lobe infiltrate 

involvement


3.  Acute left sinusitis with associated facial fractures


4.  Acute fall with a history of fall with multiple facial fractures to the left

nasal bone, superior lateral walls of the left maxillary, left face periorbital 

along with left supraorbital frontal soft tissue swelling


5.  Macrocytosis


6.  Alzheimer's dementia with behavioral disturbance and history of falls


7. Advance care planning education and counseling


7.  Social placement





Discharge Diagnoses with Status of Each Condition: 


(1) Dementia








(2) NPH (normal pressure hydrocephalus)








(3) Problem situation relating to social and personal history





(4) ulcer at nose











- HPI


History of Present Illness: 


refer from Dr. Thornton's HPI on 3/10/19 as the following


73-year-old male who With a past medical history of Alzheimer's dementia with 

behavioral disturbance who apparently lives at home with frequent falls and 

lives of TV dinners was brought in approximately a week ago however social 

worker had set up placement to [facility] but patient had sustained a fall in 

the emergency department while Awaiting placement.  In addition patient had 

likely aspirated as patient began to have decreasing level of consciousness and 

from his baseline mental status of Alzheimer's dementia.  Patient was found to 

have a right lower lobe infiltrate on chest x-ray and desaturations were clock 

at 88% on room air.  Patient was deemed to have a HCAP with underlying as

piration. Copious green mucus pooling at the level of the oropharynx was noted. 

Upon initial exam patient had bilateral expiratory rhonchi and fine bibasilar 

rales along with a normal CBC and normal electrolytes with a creatinine of 1.1 

lactic acid was normal with a C. difficile negative and a CT head showing no 

acute intracranial process.  Patient had CT of the facial bones and C-spine 

which did relieve yield no fractures at the levels to be C-spine but did reveal 

left nasal bone fractures fractures of the superior lateral walls with left 

maxillary left facial and left angel-orbital involvement.  There was left 

supraorbital and frontal soft tissue swelling as well which is consistent with 

patient falling to the ground hitting his face with some contusions and 

lacerations. 








- HOSPITAL COURSE


Hospital Course: 


(1) Dementia


stable, pt is prescribed Namenda. followup PCP








(2) NPH (normal pressure hydrocephalus)


stable, followup PCP





(3) Problem situation relating to social and personal history


D/C to [facility], followup nurse home care





(4) ulcer at nose


chronic ulcer for 7 yrs. followup dermterlogist as out-pt, nurse care for dress 

change. 








- ALLERGIES


Allergies/Adverse Reactions: 


                                    Allergies











Allergy/AdvReac Type Severity Reaction Status Date / Time


 


Sulfa (Sulfonamide Allergy  Unknown Verified 06/18/18 16:06





Antibiotics)     


 


Penicillins AdvReac  Unknown Verified 06/18/18 16:06














- MEDICATIONS


Home Medications: 


                                Ambulatory Orders











 Medication  Instructions  Recorded  Confirmed


 


Acetaminophen [Tylenol] 650 mg PO Q4HR PRN #20 tablet 04/22/19 


 


Memantine [Namenda] 5 mg PO BID #15 tablet 04/22/19 


 


Multivitamin W/Minerals [Theragran 1 tab PO DAILYWM #10 tablet 04/22/19 





M]   


 


Nystatin [Nystop] 1 applic TOP BID #1 bottle 04/22/19 


 


traZODone [Desyrel] 50 mg PO QPM PRN #10 tablet 04/22/19 














- PHYSICAL EXAM AT DISCHARGE


General Appearance: positive: No acute distress, Alert.  negative: Lethargic


Eyes Bilateral: positive: Normal inspection, PERRL, No lid inflammation, Conjun

ctivae nml


ENT: positive: ENT inspection nml, Pharynx nml, No signs of dehydration.  

negative: Purulent nasal drainage, Pharyngeal erythema, Oral lesions


Neck: positive: Nml inspection, Thyroid nml, No JVD, Trachea midline.  negative:

 Thyromegaly, Lymphadenopathy (R), Lymphadenopathy (L), Stiff neck, 

Swelling/bruising, Tracheal deviation


Respiratory: positive: Chest non-tender, No respiratory distress, Breath sounds 

nml.  negative: Wheezes, Rales, Rhonchi


Cardiovascular: positive: Regular rate & rhythm, No murmur, No gallop.  

negative: Irregularly irregular, Extrasystoles, Tachycardia, Bradycardia, JVD 

present, Systolic murmur, Diastolic murmur


Peripheral Pulses: positive: 2+


Abdomen: positive: Non-tender, No organomegaly, Nml bowel sounds, No distention.

  negative: Tenderness, Guarding, Rebound


Back: positive: Nml inspection.  negative: CVA tenderness (R), CVA tenderness 

(L)


Skin: positive: Color nml, No rash, Warm, Dry.  negative: Cyanosis, Diaphoresis,

 Pallor


Extremities: positive: Non-tender, Full ROM, Nml appearance.  negative: Calf 

tenderness, Joint swelling, Rachael's sign/cords


Neurologic/Psychiatric: positive: Motor nml, Sensation nml, Mood/affect nml.  

negative: Weakness, Sensory loss, Facial droop, Slurred/abnml speech, Depressed 

mood/affect





- LABS


Result Diagrams: 


                                 04/22/19 05:35





                                 04/22/19 05:35





- SEPSIS


Current Stage of Sepsis: Ruled out





- FOLLOW UP


Follow Up: 


pt may followup health provider at [facility] when pt is arrival to [facility], may 

followup dermatologist as out-pt for evaluation and treatment of pt's nose 

ulcer, and have dress change as nurse instruction.








- TIME SPENT


Time Spent in Discharge (Minutes): 50

## 2022-02-02 NOTE — MISCELLANEOUS PROVIDER NOTE
Miscellaneous Provider Note





- -


Note: 


Subjective: Patient is seen at bedside still awaiting placement and legal 

guardianship.





Objective: On examination vital signs hemodynamically stable. Communicates and 

verbalizes needs well.


General: Patient is alert and oriented x2 unable to state time.  Baseline 

Alzheimer's dementia.


HEENT: Multiple facial fractures with healed contusions. Nose left aspect with 

visible blood eschar and no bleeding. Pupils equal round react light and 

accommodation extraocular muscles are bilateral intact next


Neck: No JVD no bruits no lymphadenopathy next para CV lungs S1-S2 within normal

limits.  RRR.


Abdomen: Soft nontender nondistended positive bowel sounds all quads no HSM 


Extremities and skin: Multiple contusions to the face With a nose laceration 

that appears to be healing


Neuro: No psychomotor retardation cranial nerves II to XII grossly intact 

baseline Alzheimer's dementia.


 


Labs reviewed


Imaging: reviewed





Assessment/Plan





- Problem List


(1) Dementia, Alzheimer's type


Qualifiers: 


   Dementia type: unspecified type   Dementia behavioral disturbance: without 

behavioral disturbance   Qualified Code(s): F03.90 - Unspecified dementia 

without behavioral disturbance   


Assessment/Plan: 


We will continue with medical management and avoid any triggers that would 

precipitate behavioral disturbance.


Continue Namenda, which was started at admission. Patient at the present time is

a social placement issue with guardianship legal process that has been initiated

already.  We will continue to follow  recommendations and further 

updates.





(2) NPH (normal pressure hydrocephalus)


Assessment/Plan: 


This would explain patient's multiple falls however, He is not a shunt 

candidate, since it is too far progressed.





(3) Pancytopenia: Resolved 


-Repeat CBC is unremarkable.  INR 1.1 Unclear of etiology however patient has 

had recent infection and perhaps iatrogenic cause from prior HCAP. Currently off

of Lovenox.





(4) Problem situation relating to social and personal history


Assessment/Plan: 


Patient at the present time is a social placement issue with guardianship legal 

process that has been initiated already.  We will continue to follow social 

worker recommendations and further updates.


He has no family (except 1 brother who cannot be located), so no legal DPOA.


He requires placement, can no longer function independently in a senior apartme

nt.


Social Workers are working on placement for him. They have called NUMEROUS 

facilities and the problem is no legal guardianship for this patient who cannot 

make his own decisions.





DVT prophylaxis with SCD boots only.  Discontinue Lovenox.  GI prophylaxis to 

continue.





Social Work had requested that  approve  's request to work 

with needed outside  on the pursuit of guardianship for this patient. [facility] [place] [facility]

has just approved request, so this process will be officially pursued from this 

point forward. 





Attestation: The patient is expected To continue to stay more than 96 hours as a

result of patient awaiting placement which is dependent on the guardianship 

legal process which has already been initiated by social work.








Continue with DVT/GI prophylaxis. Number Of Days Off Treatment: 11